# Patient Record
Sex: MALE | Race: WHITE | URBAN - METROPOLITAN AREA
[De-identification: names, ages, dates, MRNs, and addresses within clinical notes are randomized per-mention and may not be internally consistent; named-entity substitution may affect disease eponyms.]

---

## 2023-05-05 ENCOUNTER — APPOINTMENT (OUTPATIENT)
Dept: GENERAL RADIOLOGY | Age: 38
End: 2023-05-05
Payer: COMMERCIAL

## 2023-05-05 ENCOUNTER — HOSPITAL ENCOUNTER (INPATIENT)
Age: 38
LOS: 13 days | Discharge: STILL ADMITTED OR ADMINISTRATIVE | End: 2023-05-18
Attending: EMERGENCY MEDICINE | Admitting: SURGERY
Payer: COMMERCIAL

## 2023-05-05 ENCOUNTER — APPOINTMENT (OUTPATIENT)
Dept: CT IMAGING | Age: 38
End: 2023-05-05
Payer: COMMERCIAL

## 2023-05-05 DIAGNOSIS — S72.91XC: Primary | ICD-10-CM

## 2023-05-05 DIAGNOSIS — N50.1 PELVIC HEMATOMA, MALE: ICD-10-CM

## 2023-05-05 DIAGNOSIS — R57.8 HEMORRHAGIC SHOCK (HCC): ICD-10-CM

## 2023-05-05 DIAGNOSIS — S32.9XXA CLOSED DISPLACED FRACTURE OF PELVIS, UNSPECIFIED PART OF PELVIS, INITIAL ENCOUNTER (HCC): ICD-10-CM

## 2023-05-05 PROBLEM — S32.592A CLOSED FRACTURE OF MULTIPLE PUBIC RAMI, LEFT, INITIAL ENCOUNTER (HCC): Status: ACTIVE | Noted: 2023-05-05

## 2023-05-05 PROBLEM — S32.10XA SACRAL FRACTURE, CLOSED (HCC): Status: ACTIVE | Noted: 2023-05-05

## 2023-05-05 PROBLEM — S72.351B OPEN DISPLACED COMMINUTED FRACTURE OF SHAFT OF RIGHT FEMUR (HCC): Status: ACTIVE | Noted: 2023-05-05

## 2023-05-05 PROBLEM — T14.90XA TRAUMA: Status: ACTIVE | Noted: 2023-05-05

## 2023-05-05 PROBLEM — S32.411A: Status: ACTIVE | Noted: 2023-05-05

## 2023-05-05 PROBLEM — S22.000A THORACIC COMPRESSION FRACTURE (HCC): Status: ACTIVE | Noted: 2023-05-05

## 2023-05-05 LAB
ABO + RH BLD: NORMAL
ALBUMIN SERPL-MCNC: 4.1 G/DL (ref 3.5–5.2)
ALP SERPL-CCNC: 69 U/L (ref 40–129)
ALT SERPL-CCNC: 47 U/L (ref 0–40)
AMPHET UR QL SCN: NOT DETECTED
ANGLE (CLOT STRENGTH): 68.8 DEGREE (ref 59–74)
ANGLE (CLOT STRENGTH): 69.7 DEGREE (ref 59–74)
ANION GAP SERPL CALCULATED.3IONS-SCNC: 15 MMOL/L (ref 7–16)
APAP SERPL-MCNC: <5 MCG/ML (ref 10–30)
APTT BLD: 32.6 SEC (ref 24.5–35.1)
AST SERPL-CCNC: 69 U/L (ref 0–39)
B.E.: -6 MMOL/L (ref -3–3)
BARBITURATES UR QL SCN: NOT DETECTED
BASOPHILS # BLD: 0 E9/L (ref 0–0.2)
BASOPHILS NFR BLD: 0.2 % (ref 0–2)
BENZODIAZ UR QL SCN: NOT DETECTED
BILIRUB SERPL-MCNC: 0.2 MG/DL (ref 0–1.2)
BLD GP AB SCN SERPL QL: NORMAL
BLOOD BANK DISPENSE STATUS: NORMAL
BLOOD BANK PRODUCT CODE: NORMAL
BPU ID: NORMAL
BUN SERPL-MCNC: 12 MG/DL (ref 6–20)
BURR CELLS: ABNORMAL
CALCIUM SERPL-MCNC: 8.5 MG/DL (ref 8.6–10.2)
CANNABINOIDS UR QL SCN: NOT DETECTED
CHLORIDE SERPL-SCNC: 105 MMOL/L (ref 98–107)
CO2 SERPL-SCNC: 21 MMOL/L (ref 22–29)
COCAINE UR QL SCN: NOT DETECTED
COHB: 0.3 % (ref 0–1.5)
CREAT SERPL-MCNC: 1.2 MG/DL (ref 0.7–1.2)
CRITICAL: ABNORMAL
DATE ANALYZED: ABNORMAL
DATE OF COLLECTION: ABNORMAL
DESCRIPTION BLOOD BANK: NORMAL
DRUG SCREEN COMMENT UR-IMP: ABNORMAL
EOSINOPHIL # BLD: 0 E9/L (ref 0.05–0.5)
EOSINOPHIL NFR BLD: 0 % (ref 0–6)
EPL-TEG: 0.1 % (ref 0–15)
EPL-TEG: 1.3 % (ref 0–15)
ERYTHROCYTE [DISTWIDTH] IN BLOOD BY AUTOMATED COUNT: 12 FL (ref 11.5–15)
ERYTHROCYTE [DISTWIDTH] IN BLOOD BY AUTOMATED COUNT: 13.1 FL (ref 11.5–15)
ETHANOLAMINE SERPL-MCNC: <10 MG/DL (ref 0–0.08)
ETHANOLAMINE SERPL-MCNC: <10 MG/DL (ref 0–0.08)
FENTANYL SCREEN, URINE: POSITIVE
G-TEG: 8.1 K D/SC (ref 4.5–11)
G-TEG: 8.7 K D/SC (ref 4.5–11)
GLUCOSE SERPL-MCNC: 163 MG/DL (ref 74–99)
HCO3: 18.9 MMOL/L (ref 22–26)
HCT VFR BLD AUTO: 42.3 % (ref 37–54)
HCT VFR BLD AUTO: 42.5 % (ref 37–54)
HGB BLD-MCNC: 14.1 G/DL (ref 12.5–16.5)
HGB BLD-MCNC: 14.1 G/DL (ref 12.5–16.5)
HHB: 0.6 % (ref 0–5)
INR BLD: 1.1
K (CLOTTING TIME): 1.4 MIN (ref 1–3)
K (CLOTTING TIME): 1.6 MIN (ref 1–3)
LAB: ABNORMAL
LACTATE BLDV-SCNC: 5.7 MMOL/L (ref 0.5–2.2)
LY30 (FIBRINOLYSIS): 0.1 % (ref 0–8)
LY30 (FIBRINOLYSIS): 1.3 % (ref 0–8)
LYMPHOCYTES # BLD: 0.76 E9/L (ref 1.5–4)
LYMPHOCYTES NFR BLD: 2.6 % (ref 20–42)
Lab: ABNORMAL
MA (MAX AMPLITUDE): 62 MM (ref 50–70)
MA (MAX AMPLITUDE): 63.5 MM (ref 50–70)
MCH RBC QN AUTO: 29.8 PG (ref 26–35)
MCH RBC QN AUTO: 30.5 PG (ref 26–35)
MCHC RBC AUTO-ENTMCNC: 33.2 % (ref 32–34.5)
MCHC RBC AUTO-ENTMCNC: 33.3 % (ref 32–34.5)
MCV RBC AUTO: 89.9 FL (ref 80–99.9)
MCV RBC AUTO: 91.4 FL (ref 80–99.9)
METHADONE UR QL SCN: NOT DETECTED
METHB: 0.5 % (ref 0–1.5)
MODE: ABNORMAL
MONOCYTES # BLD: 1.26 E9/L (ref 0.1–0.95)
MONOCYTES NFR BLD: 5.2 % (ref 2–12)
NEUTROPHILS # BLD: 23.28 E9/L (ref 1.8–7.3)
NEUTS SEG NFR BLD: 92.2 % (ref 43–80)
O2 CONTENT: 21.4 ML/DL
O2 SATURATION: 99.4 % (ref 92–98.5)
O2HB: 98.6 % (ref 94–97)
OPERATOR ID: 421
OPIATES UR QL SCN: NOT DETECTED
OXYCODONE URINE: NOT DETECTED
PATIENT TEMP: 37 C
PCO2: 35.5 MMHG (ref 35–45)
PCP UR QL SCN: NOT DETECTED
PH BLOOD GAS: 7.34 (ref 7.35–7.45)
PLATELET # BLD AUTO: 155 E9/L (ref 130–450)
PLATELET # BLD AUTO: 228 E9/L (ref 130–450)
PMV BLD AUTO: 10.7 FL (ref 7–12)
PMV BLD AUTO: 11.5 FL (ref 7–12)
PO2: 450.6 MMHG (ref 75–100)
POIKILOCYTES: ABNORMAL
POTASSIUM SERPL-SCNC: 3.2 MMOL/L (ref 3.5–5)
POTASSIUM SERPL-SCNC: 3.68 MMOL/L (ref 3.5–5)
PROT SERPL-MCNC: 6.2 G/DL (ref 6.4–8.3)
PROTHROMBIN TIME: 12 SEC (ref 9.3–12.4)
R (REACTION TIME): 3.7 MIN (ref 5–10)
R (REACTION TIME): 5.2 MIN (ref 5–10)
RBC # BLD AUTO: 4.63 E12/L (ref 3.8–5.8)
RBC # BLD AUTO: 4.73 E12/L (ref 3.8–5.8)
SALICYLATES SERPL-MCNC: <0.3 MG/DL (ref 0–30)
SODIUM SERPL-SCNC: 141 MMOL/L (ref 132–146)
SOURCE, BLOOD GAS: ABNORMAL
THB: 14.6 G/DL (ref 11.5–16.5)
TIME ANALYZED: 1755
TRICYCLIC ANTIDEPRESSANTS SCREEN SERUM: NEGATIVE NG/ML
WBC # BLD: 20.9 E9/L (ref 4.5–11.5)
WBC # BLD: 25.3 E9/L (ref 4.5–11.5)

## 2023-05-05 PROCEDURE — 51702 INSERT TEMP BLADDER CATH: CPT

## 2023-05-05 PROCEDURE — 72170 X-RAY EXAM OF PELVIS: CPT

## 2023-05-05 PROCEDURE — 36415 COLL VENOUS BLD VENIPUNCTURE: CPT

## 2023-05-05 PROCEDURE — 82805 BLOOD GASES W/O2 SATURATION: CPT

## 2023-05-05 PROCEDURE — P9016 RBC LEUKOCYTES REDUCED: HCPCS

## 2023-05-05 PROCEDURE — 6810039001 HC L1 TRAUMA PRIORITY

## 2023-05-05 PROCEDURE — 70450 CT HEAD/BRAIN W/O DYE: CPT

## 2023-05-05 PROCEDURE — 72125 CT NECK SPINE W/O DYE: CPT

## 2023-05-05 PROCEDURE — 83605 ASSAY OF LACTIC ACID: CPT

## 2023-05-05 PROCEDURE — 85347 COAGULATION TIME ACTIVATED: CPT

## 2023-05-05 PROCEDURE — 80179 DRUG ASSAY SALICYLATE: CPT

## 2023-05-05 PROCEDURE — 2W3MX1Z IMMOBILIZATION OF LEFT LOWER EXTREMITY USING SPLINT: ICD-10-PCS | Performed by: ORTHOPAEDIC SURGERY

## 2023-05-05 PROCEDURE — 80307 DRUG TEST PRSMV CHEM ANLYZR: CPT

## 2023-05-05 PROCEDURE — 36430 TRANSFUSION BLD/BLD COMPNT: CPT

## 2023-05-05 PROCEDURE — 73551 X-RAY EXAM OF FEMUR 1: CPT

## 2023-05-05 PROCEDURE — 73552 X-RAY EXAM OF FEMUR 2/>: CPT

## 2023-05-05 PROCEDURE — 2500000003 HC RX 250 WO HCPCS

## 2023-05-05 PROCEDURE — 85025 COMPLETE CBC W/AUTO DIFF WBC: CPT

## 2023-05-05 PROCEDURE — 85027 COMPLETE CBC AUTOMATED: CPT

## 2023-05-05 PROCEDURE — 6360000002 HC RX W HCPCS

## 2023-05-05 PROCEDURE — 85576 BLOOD PLATELET AGGREGATION: CPT

## 2023-05-05 PROCEDURE — 6360000002 HC RX W HCPCS: Performed by: EMERGENCY MEDICINE

## 2023-05-05 PROCEDURE — 99223 1ST HOSP IP/OBS HIGH 75: CPT | Performed by: SURGERY

## 2023-05-05 PROCEDURE — 76376 3D RENDER W/INTRP POSTPROCES: CPT

## 2023-05-05 PROCEDURE — 87081 CULTURE SCREEN ONLY: CPT

## 2023-05-05 PROCEDURE — 2580000003 HC RX 258

## 2023-05-05 PROCEDURE — P9059 PLASMA, FRZ BETWEEN 8-24HOUR: HCPCS

## 2023-05-05 PROCEDURE — 80143 DRUG ASSAY ACETAMINOPHEN: CPT

## 2023-05-05 PROCEDURE — 99254 IP/OBS CNSLTJ NEW/EST MOD 60: CPT | Performed by: ORTHOPAEDIC SURGERY

## 2023-05-05 PROCEDURE — 3209999900 HC NO CHARGE RADIOLOGY

## 2023-05-05 PROCEDURE — 71045 X-RAY EXAM CHEST 1 VIEW: CPT

## 2023-05-05 PROCEDURE — 71260 CT THORAX DX C+: CPT

## 2023-05-05 PROCEDURE — 86920 COMPATIBILITY TEST SPIN: CPT

## 2023-05-05 PROCEDURE — 84132 ASSAY OF SERUM POTASSIUM: CPT

## 2023-05-05 PROCEDURE — 30233N1 TRANSFUSION OF NONAUTOLOGOUS RED BLOOD CELLS INTO PERIPHERAL VEIN, PERCUTANEOUS APPROACH: ICD-10-PCS | Performed by: SURGERY

## 2023-05-05 PROCEDURE — 86923 COMPATIBILITY TEST ELECTRIC: CPT

## 2023-05-05 PROCEDURE — 86900 BLOOD TYPING SEROLOGIC ABO: CPT

## 2023-05-05 PROCEDURE — 30233K1 TRANSFUSION OF NONAUTOLOGOUS FROZEN PLASMA INTO PERIPHERAL VEIN, PERCUTANEOUS APPROACH: ICD-10-PCS | Performed by: SURGERY

## 2023-05-05 PROCEDURE — 85610 PROTHROMBIN TIME: CPT

## 2023-05-05 PROCEDURE — 72128 CT CHEST SPINE W/O DYE: CPT

## 2023-05-05 PROCEDURE — 6360000004 HC RX CONTRAST MEDICATION: Performed by: RADIOLOGY

## 2023-05-05 PROCEDURE — 85384 FIBRINOGEN ACTIVITY: CPT

## 2023-05-05 PROCEDURE — 2000000000 HC ICU R&B

## 2023-05-05 PROCEDURE — 2580000003 HC RX 258: Performed by: STUDENT IN AN ORGANIZED HEALTH CARE EDUCATION/TRAINING PROGRAM

## 2023-05-05 PROCEDURE — 96375 TX/PRO/DX INJ NEW DRUG ADDON: CPT

## 2023-05-05 PROCEDURE — 0QS9XZZ REPOSITION LEFT FEMORAL SHAFT, EXTERNAL APPROACH: ICD-10-PCS | Performed by: ORTHOPAEDIC SURGERY

## 2023-05-05 PROCEDURE — 80053 COMPREHEN METABOLIC PANEL: CPT

## 2023-05-05 PROCEDURE — 2580000003 HC RX 258: Performed by: EMERGENCY MEDICINE

## 2023-05-05 PROCEDURE — 86850 RBC ANTIBODY SCREEN: CPT

## 2023-05-05 PROCEDURE — 6370000000 HC RX 637 (ALT 250 FOR IP)

## 2023-05-05 PROCEDURE — 6360000002 HC RX W HCPCS: Performed by: STUDENT IN AN ORGANIZED HEALTH CARE EDUCATION/TRAINING PROGRAM

## 2023-05-05 PROCEDURE — 72131 CT LUMBAR SPINE W/O DYE: CPT

## 2023-05-05 PROCEDURE — 74177 CT ABD & PELVIS W/CONTRAST: CPT

## 2023-05-05 PROCEDURE — 86901 BLOOD TYPING SEROLOGIC RH(D): CPT

## 2023-05-05 PROCEDURE — 85730 THROMBOPLASTIN TIME PARTIAL: CPT

## 2023-05-05 PROCEDURE — 99285 EMERGENCY DEPT VISIT HI MDM: CPT

## 2023-05-05 PROCEDURE — 70498 CT ANGIOGRAPHY NECK: CPT

## 2023-05-05 PROCEDURE — 82077 ASSAY SPEC XCP UR&BREATH IA: CPT

## 2023-05-05 RX ORDER — POLYETHYLENE GLYCOL 3350 17 G/17G
17 POWDER, FOR SOLUTION ORAL DAILY
Status: DISCONTINUED | OUTPATIENT
Start: 2023-05-05 | End: 2023-05-18 | Stop reason: HOSPADM

## 2023-05-05 RX ORDER — LIDOCAINE HYDROCHLORIDE AND EPINEPHRINE 10; 10 MG/ML; UG/ML
INJECTION, SOLUTION INFILTRATION; PERINEURAL
Status: DISCONTINUED
Start: 2023-05-05 | End: 2023-05-06

## 2023-05-05 RX ORDER — KETAMINE HCL IN NACL, ISO-OSM 100MG/10ML
20 SYRINGE (ML) INJECTION ONCE
Status: DISCONTINUED | OUTPATIENT
Start: 2023-05-05 | End: 2023-05-06

## 2023-05-05 RX ORDER — SODIUM CHLORIDE 9 MG/ML
INJECTION, SOLUTION INTRAVENOUS PRN
Status: DISCONTINUED | OUTPATIENT
Start: 2023-05-05 | End: 2023-05-07 | Stop reason: SDUPTHER

## 2023-05-05 RX ORDER — METHOCARBAMOL 750 MG/1
750 TABLET, FILM COATED ORAL 4 TIMES DAILY
Status: DISCONTINUED | OUTPATIENT
Start: 2023-05-05 | End: 2023-05-08

## 2023-05-05 RX ORDER — SODIUM CHLORIDE 0.9 % (FLUSH) 0.9 %
10 SYRINGE (ML) INJECTION PRN
Status: DISCONTINUED | OUTPATIENT
Start: 2023-05-05 | End: 2023-05-18 | Stop reason: HOSPADM

## 2023-05-05 RX ORDER — GABAPENTIN 100 MG/1
200 CAPSULE ORAL EVERY 8 HOURS
Status: DISCONTINUED | OUTPATIENT
Start: 2023-05-05 | End: 2023-05-08

## 2023-05-05 RX ORDER — FENTANYL CITRATE 50 UG/ML
INJECTION, SOLUTION INTRAMUSCULAR; INTRAVENOUS
Status: COMPLETED
Start: 2023-05-05 | End: 2023-05-05

## 2023-05-05 RX ORDER — FENTANYL CITRATE 50 UG/ML
50 INJECTION, SOLUTION INTRAMUSCULAR; INTRAVENOUS ONCE
Status: COMPLETED | OUTPATIENT
Start: 2023-05-05 | End: 2023-05-05

## 2023-05-05 RX ORDER — SODIUM CHLORIDE 9 MG/ML
INJECTION, SOLUTION INTRAVENOUS CONTINUOUS
Status: DISCONTINUED | OUTPATIENT
Start: 2023-05-05 | End: 2023-05-06

## 2023-05-05 RX ORDER — FENTANYL CITRATE 50 UG/ML
INJECTION, SOLUTION INTRAMUSCULAR; INTRAVENOUS DAILY PRN
Status: COMPLETED | OUTPATIENT
Start: 2023-05-05 | End: 2023-05-05

## 2023-05-05 RX ORDER — HYDROMORPHONE HYDROCHLORIDE 1 MG/ML
0.5 INJECTION, SOLUTION INTRAMUSCULAR; INTRAVENOUS; SUBCUTANEOUS
Status: DISCONTINUED | OUTPATIENT
Start: 2023-05-05 | End: 2023-05-06

## 2023-05-05 RX ORDER — KETAMINE HYDROCHLORIDE 10 MG/ML
20 INJECTION INTRAMUSCULAR; INTRAVENOUS ONCE
Status: DISCONTINUED | OUTPATIENT
Start: 2023-05-05 | End: 2023-05-05 | Stop reason: SDUPTHER

## 2023-05-05 RX ORDER — HYDROMORPHONE HYDROCHLORIDE 1 MG/ML
1 INJECTION, SOLUTION INTRAMUSCULAR; INTRAVENOUS; SUBCUTANEOUS ONCE
Status: COMPLETED | OUTPATIENT
Start: 2023-05-05 | End: 2023-05-05

## 2023-05-05 RX ORDER — ONDANSETRON 4 MG/1
4 TABLET, ORALLY DISINTEGRATING ORAL EVERY 8 HOURS PRN
Status: DISCONTINUED | OUTPATIENT
Start: 2023-05-05 | End: 2023-05-18 | Stop reason: HOSPADM

## 2023-05-05 RX ORDER — SODIUM CHLORIDE 0.9 % (FLUSH) 0.9 %
10 SYRINGE (ML) INJECTION EVERY 12 HOURS SCHEDULED
Status: DISCONTINUED | OUTPATIENT
Start: 2023-05-05 | End: 2023-05-18 | Stop reason: HOSPADM

## 2023-05-05 RX ORDER — OXYCODONE HYDROCHLORIDE 10 MG/1
10 TABLET ORAL EVERY 4 HOURS PRN
Status: DISCONTINUED | OUTPATIENT
Start: 2023-05-05 | End: 2023-05-12

## 2023-05-05 RX ORDER — ACETAMINOPHEN 325 MG/1
650 TABLET ORAL EVERY 6 HOURS
Status: DISCONTINUED | OUTPATIENT
Start: 2023-05-05 | End: 2023-05-18 | Stop reason: HOSPADM

## 2023-05-05 RX ORDER — SODIUM CHLORIDE, SODIUM LACTATE, POTASSIUM CHLORIDE, CALCIUM CHLORIDE 600; 310; 30; 20 MG/100ML; MG/100ML; MG/100ML; MG/100ML
INJECTION, SOLUTION INTRAVENOUS CONTINUOUS
Status: DISCONTINUED | OUTPATIENT
Start: 2023-05-05 | End: 2023-05-07 | Stop reason: SDUPTHER

## 2023-05-05 RX ORDER — OXYCODONE HYDROCHLORIDE 5 MG/1
5 TABLET ORAL EVERY 4 HOURS PRN
Status: DISCONTINUED | OUTPATIENT
Start: 2023-05-05 | End: 2023-05-12

## 2023-05-05 RX ORDER — SENNA AND DOCUSATE SODIUM 50; 8.6 MG/1; MG/1
1 TABLET, FILM COATED ORAL 2 TIMES DAILY
Status: DISCONTINUED | OUTPATIENT
Start: 2023-05-05 | End: 2023-05-09

## 2023-05-05 RX ORDER — ONDANSETRON 2 MG/ML
4 INJECTION INTRAMUSCULAR; INTRAVENOUS EVERY 6 HOURS PRN
Status: DISCONTINUED | OUTPATIENT
Start: 2023-05-05 | End: 2023-05-18 | Stop reason: HOSPADM

## 2023-05-05 RX ORDER — HYDROMORPHONE HYDROCHLORIDE 1 MG/ML
0.25 INJECTION, SOLUTION INTRAMUSCULAR; INTRAVENOUS; SUBCUTANEOUS
Status: DISCONTINUED | OUTPATIENT
Start: 2023-05-05 | End: 2023-05-06

## 2023-05-05 RX ADMIN — FENTANYL CITRATE 50 MCG: 50 INJECTION, SOLUTION INTRAMUSCULAR; INTRAVENOUS at 20:05

## 2023-05-05 RX ADMIN — CEFAZOLIN 2000 MG: 2 INJECTION, POWDER, FOR SOLUTION INTRAMUSCULAR; INTRAVENOUS at 17:46

## 2023-05-05 RX ADMIN — METHOCARBAMOL 750 MG: 750 TABLET ORAL at 21:16

## 2023-05-05 RX ADMIN — GENTAMICIN SULFATE 475 MG: 40 INJECTION, SOLUTION INTRAMUSCULAR; INTRAVENOUS at 18:11

## 2023-05-05 RX ADMIN — SODIUM CHLORIDE, POTASSIUM CHLORIDE, SODIUM LACTATE AND CALCIUM CHLORIDE: 600; 310; 30; 20 INJECTION, SOLUTION INTRAVENOUS at 19:40

## 2023-05-05 RX ADMIN — HYDROMORPHONE HYDROCHLORIDE 0.5 MG: 1 INJECTION, SOLUTION INTRAMUSCULAR; INTRAVENOUS; SUBCUTANEOUS at 22:19

## 2023-05-05 RX ADMIN — HYDROMORPHONE HYDROCHLORIDE 1 MG: 1 INJECTION, SOLUTION INTRAMUSCULAR; INTRAVENOUS; SUBCUTANEOUS at 20:40

## 2023-05-05 RX ADMIN — GABAPENTIN 200 MG: 100 CAPSULE ORAL at 21:17

## 2023-05-05 RX ADMIN — DOCUSATE SODIUM AND SENNOSIDES 1 TABLET: 8.6; 5 TABLET, FILM COATED ORAL at 21:16

## 2023-05-05 RX ADMIN — IOPAMIDOL 90 ML: 755 INJECTION, SOLUTION INTRAVENOUS at 18:47

## 2023-05-05 RX ADMIN — SODIUM CHLORIDE: 9 INJECTION, SOLUTION INTRAVENOUS at 19:02

## 2023-05-05 RX ADMIN — ACETAMINOPHEN 650 MG: 325 TABLET ORAL at 21:30

## 2023-05-05 RX ADMIN — FENTANYL CITRATE 50 MCG: 50 INJECTION, SOLUTION INTRAMUSCULAR; INTRAVENOUS at 18:05

## 2023-05-05 RX ADMIN — HYDROMORPHONE HYDROCHLORIDE 0.5 MG: 1 INJECTION, SOLUTION INTRAMUSCULAR; INTRAVENOUS; SUBCUTANEOUS at 19:06

## 2023-05-05 RX ADMIN — ONDANSETRON 4 MG: 2 INJECTION INTRAMUSCULAR; INTRAVENOUS at 23:18

## 2023-05-05 RX ADMIN — FENTANYL CITRATE 50 MCG: 50 INJECTION, SOLUTION INTRAMUSCULAR; INTRAVENOUS at 17:57

## 2023-05-05 RX ADMIN — SODIUM CHLORIDE, POTASSIUM CHLORIDE, SODIUM LACTATE AND CALCIUM CHLORIDE: 600; 310; 30; 20 INJECTION, SOLUTION INTRAVENOUS at 21:09

## 2023-05-05 ASSESSMENT — PAIN DESCRIPTION - PAIN TYPE
TYPE: ACUTE PAIN

## 2023-05-05 ASSESSMENT — PAIN - FUNCTIONAL ASSESSMENT
PAIN_FUNCTIONAL_ASSESSMENT: PREVENTS OR INTERFERES WITH ALL ACTIVE AND SOME PASSIVE ACTIVITIES
PAIN_FUNCTIONAL_ASSESSMENT: PREVENTS OR INTERFERES WITH MANY ACTIVE NOT PASSIVE ACTIVITIES
PAIN_FUNCTIONAL_ASSESSMENT: PREVENTS OR INTERFERES WITH ALL ACTIVE AND SOME PASSIVE ACTIVITIES

## 2023-05-05 ASSESSMENT — PAIN DESCRIPTION - DESCRIPTORS
DESCRIPTORS: ACHING;SORE;DISCOMFORT
DESCRIPTORS: ACHING;DISCOMFORT;SORE
DESCRIPTORS: ACHING;BURNING;DISCOMFORT

## 2023-05-05 ASSESSMENT — PAIN DESCRIPTION - ORIENTATION
ORIENTATION: MID
ORIENTATION: MID
ORIENTATION: RIGHT;MID
ORIENTATION: RIGHT

## 2023-05-05 ASSESSMENT — PAIN DESCRIPTION - FREQUENCY
FREQUENCY: CONTINUOUS

## 2023-05-05 ASSESSMENT — PAIN SCALES - GENERAL
PAINLEVEL_OUTOF10: 10
PAINLEVEL_OUTOF10: 7
PAINLEVEL_OUTOF10: 7
PAINLEVEL_OUTOF10: 4
PAINLEVEL_OUTOF10: 3
PAINLEVEL_OUTOF10: 10
PAINLEVEL_OUTOF10: 4

## 2023-05-05 ASSESSMENT — PAIN DESCRIPTION - LOCATION
LOCATION: BACK
LOCATION: BACK
LOCATION: PELVIS;BACK
LOCATION: BACK;PELVIS;LEG

## 2023-05-05 ASSESSMENT — PAIN DESCRIPTION - ONSET
ONSET: ON-GOING

## 2023-05-06 ENCOUNTER — ANESTHESIA (OUTPATIENT)
Dept: OPERATING ROOM | Age: 38
End: 2023-05-06
Payer: COMMERCIAL

## 2023-05-06 ENCOUNTER — ANESTHESIA EVENT (OUTPATIENT)
Dept: OPERATING ROOM | Age: 38
End: 2023-05-06
Payer: COMMERCIAL

## 2023-05-06 ENCOUNTER — APPOINTMENT (OUTPATIENT)
Dept: GENERAL RADIOLOGY | Age: 38
End: 2023-05-06
Payer: COMMERCIAL

## 2023-05-06 PROBLEM — E87.20 LACTIC ACIDOSIS: Status: ACTIVE | Noted: 2023-05-06

## 2023-05-06 PROBLEM — S72.91XC: Status: ACTIVE | Noted: 2023-05-05

## 2023-05-06 PROBLEM — D62 ACUTE BLOOD LOSS ANEMIA: Status: ACTIVE | Noted: 2023-05-06

## 2023-05-06 PROBLEM — R52 ACUTE PAIN: Status: ACTIVE | Noted: 2023-05-06

## 2023-05-06 PROBLEM — S32.9XXA CLOSED DISPLACED FRACTURE OF PELVIS (HCC): Status: ACTIVE | Noted: 2023-05-05

## 2023-05-06 LAB
ALBUMIN SERPL-MCNC: 3.4 G/DL (ref 3.5–5.2)
ALP SERPL-CCNC: 48 U/L (ref 40–129)
ALT SERPL-CCNC: 53 U/L (ref 0–40)
ANION GAP SERPL CALCULATED.3IONS-SCNC: 10 MMOL/L (ref 7–16)
ANION GAP: 12 MMOL/L (ref 7–16)
AST SERPL-CCNC: 139 U/L (ref 0–39)
B.E.: -3.5 MMOL/L (ref -3–3)
B.E.: -5.9 MMOL/L (ref -3–3)
BASOPHILS # BLD: 0 E9/L (ref 0–0.2)
BASOPHILS NFR BLD: 0.1 % (ref 0–2)
BILIRUB SERPL-MCNC: 0.4 MG/DL (ref 0–1.2)
BUN SERPL-MCNC: 14 MG/DL (ref 6–20)
CALCIUM SERPL-MCNC: 7.9 MG/DL (ref 8.6–10.2)
CARDIOPULMONARY BYPASS: NO
CHLORIDE SERPL-SCNC: 105 MMOL/L (ref 98–107)
CO2 SERPL-SCNC: 24 MMOL/L (ref 22–29)
COHB: 1.1 % (ref 0–1.5)
CREAT SERPL-MCNC: 1 MG/DL (ref 0.7–1.2)
CRITICAL NOTIFICATION: YES
CRITICAL: ABNORMAL
DATE ANALYZED: ABNORMAL
DATE OF COLLECTION: ABNORMAL
DEVICE: ABNORMAL
EOSINOPHIL # BLD: 0 E9/L (ref 0.05–0.5)
EOSINOPHIL NFR BLD: 0 % (ref 0–6)
ERYTHROCYTE [DISTWIDTH] IN BLOOD BY AUTOMATED COUNT: 13.3 FL (ref 11.5–15)
ERYTHROCYTE [DISTWIDTH] IN BLOOD BY AUTOMATED COUNT: 13.4 FL (ref 11.5–15)
ERYTHROCYTE [DISTWIDTH] IN BLOOD BY AUTOMATED COUNT: 13.6 FL (ref 11.5–15)
GFR, ESTIMATED: >60 ML/MIN/1.73
GLUCOSE BLD-MCNC: 121 MG/DL (ref 74–99)
GLUCOSE SERPL-MCNC: 175 MG/DL (ref 74–99)
HCO3: 19.4 MMOL/L (ref 22–26)
HCO3: 22 MMOL/L (ref 22–26)
HCT VFR BLD AUTO: 28.8 % (ref 37–54)
HCT VFR BLD AUTO: 32.7 % (ref 37–54)
HCT VFR BLD AUTO: 37.4 % (ref 37–54)
HCT VFR BLD AUTO: 38.5 % (ref 37–54)
HCT VFR BLD AUTO: 43.3 % (ref 37–54)
HEMATOCRIT: 31 % (ref 37–54)
HEMOGLOBIN: 10.6 G/DL (ref 12.5–15.5)
HGB BLD-MCNC: 11.1 G/DL (ref 12.5–16.5)
HGB BLD-MCNC: 12.4 G/DL (ref 12.5–16.5)
HGB BLD-MCNC: 12.6 G/DL (ref 12.5–16.5)
HGB BLD-MCNC: 14.4 G/DL (ref 12.5–16.5)
HGB BLD-MCNC: 9.8 G/DL (ref 12.5–16.5)
HHB: 4.4 % (ref 0–5)
LAB: ABNORMAL
LACTATE BLDV-SCNC: 3.1 MMOL/L (ref 0.5–2.2)
LACTATE BLDV-SCNC: 3.9 MMOL/L (ref 0.5–2.2)
LACTATE BLDV-SCNC: 5.6 MMOL/L (ref 0.5–2.2)
LACTATE BLDV-SCNC: 6.2 MMOL/L (ref 0.5–2.2)
LYMPHOCYTES # BLD: 0.88 E9/L (ref 1.5–4)
LYMPHOCYTES NFR BLD: 6.1 % (ref 20–42)
Lab: ABNORMAL
MCH RBC QN AUTO: 29.4 PG (ref 26–35)
MCH RBC QN AUTO: 30.1 PG (ref 26–35)
MCH RBC QN AUTO: 30.2 PG (ref 26–35)
MCHC RBC AUTO-ENTMCNC: 32.7 % (ref 32–34.5)
MCHC RBC AUTO-ENTMCNC: 33.9 % (ref 32–34.5)
MCHC RBC AUTO-ENTMCNC: 34 % (ref 32–34.5)
MCV RBC AUTO: 88.3 FL (ref 80–99.9)
MCV RBC AUTO: 88.9 FL (ref 80–99.9)
MCV RBC AUTO: 90 FL (ref 80–99.9)
METHB: 0.2 % (ref 0–1.5)
MODE: ABNORMAL
MONOCYTES # BLD: 0.59 E9/L (ref 0.1–0.95)
MONOCYTES NFR BLD: 3.5 % (ref 2–12)
NEUTROPHILS # BLD: 13.23 E9/L (ref 1.8–7.3)
NEUTS SEG NFR BLD: 90.4 % (ref 43–80)
O2 CONTENT: 19.7 ML/DL
O2 SATURATION: 95.5 % (ref 92–98.5)
O2 SATURATION: 98.9 % (ref 92–98.5)
O2HB: 94.3 % (ref 94–97)
OPERATOR ID: 2962
OPERATOR ID: ABNORMAL
OVALOCYTES: ABNORMAL
PATIENT TEMP: 37 C
PCO2 37: 40.9 MMHG (ref 35–45)
PCO2: 37.7 MMHG (ref 35–45)
PH 37: 7.34 (ref 7.35–7.45)
PH BLOOD GAS: 7.33 (ref 7.35–7.45)
PLATELET # BLD AUTO: 103 E9/L (ref 130–450)
PLATELET # BLD AUTO: 114 E9/L (ref 130–450)
PLATELET # BLD AUTO: 120 E9/L (ref 130–450)
PMV BLD AUTO: 10.8 FL (ref 7–12)
PMV BLD AUTO: 10.8 FL (ref 7–12)
PMV BLD AUTO: 11.1 FL (ref 7–12)
PO2 37: 134.9 MMHG (ref 80–100)
PO2: 81.5 MMHG (ref 75–100)
POC BUN: 14 MG/DL (ref 8–23)
POC CHLORIDE: 107 MMOL/L (ref 100–108)
POC CO2: 21.3 MMOL/L (ref 22–29)
POC CREATININE: 0.9 MG/DL (ref 0.7–1.2)
POC IONIZED CALCIUM: 1.1 (ref 1.1–1.3)
POC LACTIC ACID: 3.4 (ref 0.5–2.2)
POC SODIUM: 140 MMOL/L (ref 132–146)
POC SOURCE: ABNORMAL
POIKILOCYTES: ABNORMAL
POTASSIUM SERPL-SCNC: 4.7 MMOL/L (ref 3.5–5)
POTASSIUM SERPL-SCNC: 4.9 MMOL/L (ref 3.5–5.5)
PROT SERPL-MCNC: 5.9 G/DL (ref 6.4–8.3)
RBC # BLD AUTO: 3.26 E12/L (ref 3.8–5.8)
RBC # BLD AUTO: 3.68 E12/L (ref 3.8–5.8)
RBC # BLD AUTO: 4.28 E12/L (ref 3.8–5.8)
SODIUM SERPL-SCNC: 139 MMOL/L (ref 132–146)
SOURCE, BLOOD GAS: ABNORMAL
THB: 14.8 G/DL (ref 11.5–16.5)
TIME ANALYZED: 218
WBC # BLD: 11.6 E9/L (ref 4.5–11.5)
WBC # BLD: 14.3 E9/L (ref 4.5–11.5)
WBC # BLD: 14.7 E9/L (ref 4.5–11.5)

## 2023-05-06 PROCEDURE — 7100000000 HC PACU RECOVERY - FIRST 15 MIN

## 2023-05-06 PROCEDURE — 2580000003 HC RX 258

## 2023-05-06 PROCEDURE — 6360000002 HC RX W HCPCS: Performed by: ORTHOPAEDIC SURGERY

## 2023-05-06 PROCEDURE — 99291 CRITICAL CARE FIRST HOUR: CPT | Performed by: SURGERY

## 2023-05-06 PROCEDURE — 2500000003 HC RX 250 WO HCPCS

## 2023-05-06 PROCEDURE — 2580000003 HC RX 258: Performed by: STUDENT IN AN ORGANIZED HEALTH CARE EDUCATION/TRAINING PROGRAM

## 2023-05-06 PROCEDURE — 3700000001 HC ADD 15 MINUTES (ANESTHESIA): Performed by: ORTHOPAEDIC SURGERY

## 2023-05-06 PROCEDURE — 3209999900 FLUORO FOR SURGICAL PROCEDURES

## 2023-05-06 PROCEDURE — 80053 COMPREHEN METABOLIC PANEL: CPT

## 2023-05-06 PROCEDURE — 6370000000 HC RX 637 (ALT 250 FOR IP)

## 2023-05-06 PROCEDURE — 73552 X-RAY EXAM OF FEMUR 2/>: CPT

## 2023-05-06 PROCEDURE — 6360000002 HC RX W HCPCS

## 2023-05-06 PROCEDURE — 73521 X-RAY EXAM HIPS BI 2 VIEWS: CPT

## 2023-05-06 PROCEDURE — 2000000000 HC ICU R&B

## 2023-05-06 PROCEDURE — 0QS904Z REPOSITION LEFT FEMORAL SHAFT WITH INTERNAL FIXATION DEVICE, OPEN APPROACH: ICD-10-PCS | Performed by: ORTHOPAEDIC SURGERY

## 2023-05-06 PROCEDURE — 7100000001 HC PACU RECOVERY - ADDTL 15 MIN

## 2023-05-06 PROCEDURE — 83605 ASSAY OF LACTIC ACID: CPT

## 2023-05-06 PROCEDURE — 2709999900 HC NON-CHARGEABLE SUPPLY: Performed by: ORTHOPAEDIC SURGERY

## 2023-05-06 PROCEDURE — 11012 DEB SKIN BONE AT FX SITE: CPT | Performed by: ORTHOPAEDIC SURGERY

## 2023-05-06 PROCEDURE — 85027 COMPLETE CBC AUTOMATED: CPT

## 2023-05-06 PROCEDURE — 0QS335Z REPOSITION LEFT PELVIC BONE WITH EXTERNAL FIXATION DEVICE, PERCUTANEOUS APPROACH: ICD-10-PCS | Performed by: ORTHOPAEDIC SURGERY

## 2023-05-06 PROCEDURE — 36415 COLL VENOUS BLD VENIPUNCTURE: CPT

## 2023-05-06 PROCEDURE — 6360000002 HC RX W HCPCS: Performed by: STUDENT IN AN ORGANIZED HEALTH CARE EDUCATION/TRAINING PROGRAM

## 2023-05-06 PROCEDURE — L0464 TLSO 4MOD SACRO-SCAP PRE: HCPCS

## 2023-05-06 PROCEDURE — 85025 COMPLETE CBC W/AUTO DIFF WBC: CPT

## 2023-05-06 PROCEDURE — 0QH905Z INSERTION OF EXTERNAL FIXATION DEVICE INTO LEFT FEMORAL SHAFT, OPEN APPROACH: ICD-10-PCS | Performed by: ORTHOPAEDIC SURGERY

## 2023-05-06 PROCEDURE — 85014 HEMATOCRIT: CPT

## 2023-05-06 PROCEDURE — 85018 HEMOGLOBIN: CPT

## 2023-05-06 PROCEDURE — 82805 BLOOD GASES W/O2 SATURATION: CPT

## 2023-05-06 PROCEDURE — 3600000005 HC SURGERY LEVEL 5 BASE: Performed by: ORTHOPAEDIC SURGERY

## 2023-05-06 PROCEDURE — 20690 APPL UNIPLN UNI EXT FIXJ SYS: CPT | Performed by: ORTHOPAEDIC SURGERY

## 2023-05-06 PROCEDURE — 11981 INSERTION DRUG DLVR IMPLANT: CPT | Performed by: ORTHOPAEDIC SURGERY

## 2023-05-06 PROCEDURE — 3700000000 HC ANESTHESIA ATTENDED CARE: Performed by: ORTHOPAEDIC SURGERY

## 2023-05-06 PROCEDURE — 27506 TREATMENT OF THIGH FRACTURE: CPT | Performed by: ORTHOPAEDIC SURGERY

## 2023-05-06 PROCEDURE — 82803 BLOOD GASES ANY COMBINATION: CPT

## 2023-05-06 PROCEDURE — 37799 UNLISTED PX VASCULAR SURGERY: CPT

## 2023-05-06 PROCEDURE — C1713 ANCHOR/SCREW BN/BN,TIS/BN: HCPCS | Performed by: ORTHOPAEDIC SURGERY

## 2023-05-06 PROCEDURE — 0QS235Z REPOSITION RIGHT PELVIC BONE WITH EXTERNAL FIXATION DEVICE, PERCUTANEOUS APPROACH: ICD-10-PCS | Performed by: ORTHOPAEDIC SURGERY

## 2023-05-06 PROCEDURE — 27198 CLSD TX PELVIC RING FX: CPT | Performed by: ORTHOPAEDIC SURGERY

## 2023-05-06 PROCEDURE — 3600000015 HC SURGERY LEVEL 5 ADDTL 15MIN: Performed by: ORTHOPAEDIC SURGERY

## 2023-05-06 PROCEDURE — 72170 X-RAY EXAM OF PELVIS: CPT

## 2023-05-06 PROCEDURE — 2720000010 HC SURG SUPPLY STERILE: Performed by: ORTHOPAEDIC SURGERY

## 2023-05-06 DEVICE — STIMULAN® RAPID CURE PROVIDED STERILE FOR SINGLE PATIENT USE. STIMULAN® RAPID CURE CONTAINS CALCIUM SULFATE POWDER AND MIXING SOLUTION IN PRE-MEASURED QUANTITIES SO THAT WHEN MIXED TOGETHER IN A STERILE MIXING BOWL, THE RESULTANT PASTE IS TO BE DIGITALLY PACKED INTO OPEN BONE VOID/GAP TO SET INSITU OR PLACED INTO THE MOULD PROVIDED, THE MIXTURE SETS TO FORM BEADS. THE BIODEGRADABLE, RADIOPAQUE BEADS ARE RESORBED IN APPROXIMATELY 30 – 60 DAYS WHEN USED IN ACCORDANCE WITH THE DEVICE LABELLING. STIMULAN® RAPID CURE IS MANUFACTURED FROM SYNTHETIC IMPLANT GRADE CALCIUM SULFATE DIHYDRATE(CASO4.2H2O) THAT RESORBS AND IS REPLACED WITH BONE DURING THE HEALING PROCESS. ALSO, AS THE BONE VOID FILLER BEADS ARE BIODEGRADABLE AND BIOCOMPATIBLE, THEY MAY BE USED AT AN INFECTED SITE.
Type: IMPLANTABLE DEVICE | Site: LEG | Status: FUNCTIONAL
Brand: STIMULAN® RAPID CURE

## 2023-05-06 RX ORDER — SODIUM CHLORIDE, SODIUM LACTATE, POTASSIUM CHLORIDE, CALCIUM CHLORIDE 600; 310; 30; 20 MG/100ML; MG/100ML; MG/100ML; MG/100ML
INJECTION, SOLUTION INTRAVENOUS CONTINUOUS PRN
Status: DISCONTINUED | OUTPATIENT
Start: 2023-05-06 | End: 2023-05-06 | Stop reason: SDUPTHER

## 2023-05-06 RX ORDER — SODIUM CHLORIDE 0.9 % (FLUSH) 0.9 %
5-40 SYRINGE (ML) INJECTION EVERY 12 HOURS SCHEDULED
Status: DISCONTINUED | OUTPATIENT
Start: 2023-05-07 | End: 2023-05-08 | Stop reason: HOSPADM

## 2023-05-06 RX ORDER — ACETAMINOPHEN 325 MG/1
650 TABLET ORAL
Status: DISCONTINUED | OUTPATIENT
Start: 2023-05-06 | End: 2023-05-07 | Stop reason: SDUPTHER

## 2023-05-06 RX ORDER — CEFAZOLIN SODIUM 1 G/3ML
INJECTION, POWDER, FOR SOLUTION INTRAMUSCULAR; INTRAVENOUS PRN
Status: DISCONTINUED | OUTPATIENT
Start: 2023-05-06 | End: 2023-05-06 | Stop reason: SDUPTHER

## 2023-05-06 RX ORDER — MIDAZOLAM HYDROCHLORIDE 1 MG/ML
INJECTION INTRAMUSCULAR; INTRAVENOUS PRN
Status: DISCONTINUED | OUTPATIENT
Start: 2023-05-06 | End: 2023-05-06 | Stop reason: SDUPTHER

## 2023-05-06 RX ORDER — TOBRAMYCIN 1.2 G/30ML
INJECTION, POWDER, LYOPHILIZED, FOR SOLUTION INTRAVENOUS PRN
Status: DISCONTINUED | OUTPATIENT
Start: 2023-05-06 | End: 2023-05-06 | Stop reason: ALTCHOICE

## 2023-05-06 RX ORDER — LABETALOL HYDROCHLORIDE 5 MG/ML
10 INJECTION, SOLUTION INTRAVENOUS
Status: DISCONTINUED | OUTPATIENT
Start: 2023-05-06 | End: 2023-05-18 | Stop reason: HOSPADM

## 2023-05-06 RX ORDER — VANCOMYCIN HYDROCHLORIDE 1 G/20ML
INJECTION, POWDER, LYOPHILIZED, FOR SOLUTION INTRAVENOUS PRN
Status: DISCONTINUED | OUTPATIENT
Start: 2023-05-06 | End: 2023-05-06 | Stop reason: ALTCHOICE

## 2023-05-06 RX ORDER — PHENYLEPHRINE HCL IN 0.9% NACL 1 MG/10 ML
SYRINGE (ML) INTRAVENOUS PRN
Status: DISCONTINUED | OUTPATIENT
Start: 2023-05-06 | End: 2023-05-06 | Stop reason: SDUPTHER

## 2023-05-06 RX ORDER — SODIUM CHLORIDE, SODIUM LACTATE, POTASSIUM CHLORIDE, AND CALCIUM CHLORIDE .6; .31; .03; .02 G/100ML; G/100ML; G/100ML; G/100ML
1000 INJECTION, SOLUTION INTRAVENOUS ONCE
Status: COMPLETED | OUTPATIENT
Start: 2023-05-06 | End: 2023-05-06

## 2023-05-06 RX ORDER — ROCURONIUM BROMIDE 10 MG/ML
INJECTION, SOLUTION INTRAVENOUS PRN
Status: DISCONTINUED | OUTPATIENT
Start: 2023-05-06 | End: 2023-05-06 | Stop reason: SDUPTHER

## 2023-05-06 RX ORDER — CALCIUM CHLORIDE 100 MG/ML
INJECTION INTRAVENOUS; INTRAVENTRICULAR PRN
Status: DISCONTINUED | OUTPATIENT
Start: 2023-05-06 | End: 2023-05-06 | Stop reason: SDUPTHER

## 2023-05-06 RX ORDER — DROPERIDOL 2.5 MG/ML
0.62 INJECTION, SOLUTION INTRAMUSCULAR; INTRAVENOUS
Status: ACTIVE | OUTPATIENT
Start: 2023-05-06 | End: 2023-05-07

## 2023-05-06 RX ORDER — SODIUM CHLORIDE 0.9 % (FLUSH) 0.9 %
5-40 SYRINGE (ML) INJECTION PRN
Status: DISCONTINUED | OUTPATIENT
Start: 2023-05-06 | End: 2023-05-08 | Stop reason: HOSPADM

## 2023-05-06 RX ORDER — HYDROMORPHONE HYDROCHLORIDE 1 MG/ML
0.5 INJECTION, SOLUTION INTRAMUSCULAR; INTRAVENOUS; SUBCUTANEOUS EVERY 5 MIN PRN
Status: DISCONTINUED | OUTPATIENT
Start: 2023-05-06 | End: 2023-05-07 | Stop reason: SDUPTHER

## 2023-05-06 RX ORDER — ONDANSETRON 2 MG/ML
4 INJECTION INTRAMUSCULAR; INTRAVENOUS
Status: DISCONTINUED | OUTPATIENT
Start: 2023-05-06 | End: 2023-05-07 | Stop reason: SDUPTHER

## 2023-05-06 RX ORDER — DIPHENHYDRAMINE HYDROCHLORIDE 50 MG/ML
12.5 INJECTION INTRAMUSCULAR; INTRAVENOUS
Status: DISCONTINUED | OUTPATIENT
Start: 2023-05-06 | End: 2023-05-07

## 2023-05-06 RX ORDER — DEXAMETHASONE SODIUM PHOSPHATE 10 MG/ML
INJECTION INTRAMUSCULAR; INTRAVENOUS PRN
Status: DISCONTINUED | OUTPATIENT
Start: 2023-05-06 | End: 2023-05-06 | Stop reason: SDUPTHER

## 2023-05-06 RX ORDER — HYDRALAZINE HYDROCHLORIDE 20 MG/ML
10 INJECTION INTRAMUSCULAR; INTRAVENOUS
Status: DISCONTINUED | OUTPATIENT
Start: 2023-05-06 | End: 2023-05-18 | Stop reason: HOSPADM

## 2023-05-06 RX ORDER — MAGNESIUM SULFATE HEPTAHYDRATE 40 MG/ML
INJECTION, SOLUTION INTRAVENOUS PRN
Status: DISCONTINUED | OUTPATIENT
Start: 2023-05-06 | End: 2023-05-06 | Stop reason: SDUPTHER

## 2023-05-06 RX ORDER — LIDOCAINE HYDROCHLORIDE 20 MG/ML
INJECTION, SOLUTION INTRAVENOUS PRN
Status: DISCONTINUED | OUTPATIENT
Start: 2023-05-06 | End: 2023-05-06 | Stop reason: SDUPTHER

## 2023-05-06 RX ORDER — FENTANYL CITRATE 50 UG/ML
INJECTION, SOLUTION INTRAMUSCULAR; INTRAVENOUS PRN
Status: DISCONTINUED | OUTPATIENT
Start: 2023-05-06 | End: 2023-05-06 | Stop reason: SDUPTHER

## 2023-05-06 RX ORDER — OXYCODONE HYDROCHLORIDE 5 MG/1
5 TABLET ORAL
Status: DISCONTINUED | OUTPATIENT
Start: 2023-05-06 | End: 2023-05-07 | Stop reason: SDUPTHER

## 2023-05-06 RX ORDER — DEXTROSE MONOHYDRATE 100 MG/ML
INJECTION, SOLUTION INTRAVENOUS CONTINUOUS PRN
Status: DISCONTINUED | OUTPATIENT
Start: 2023-05-06 | End: 2023-05-14

## 2023-05-06 RX ORDER — KETAMINE HCL IN NACL, ISO-OSM 100MG/10ML
SYRINGE (ML) INJECTION PRN
Status: DISCONTINUED | OUTPATIENT
Start: 2023-05-06 | End: 2023-05-06 | Stop reason: SDUPTHER

## 2023-05-06 RX ORDER — SODIUM CHLORIDE 9 MG/ML
INJECTION, SOLUTION INTRAVENOUS PRN
Status: DISCONTINUED | OUTPATIENT
Start: 2023-05-06 | End: 2023-05-08 | Stop reason: HOSPADM

## 2023-05-06 RX ORDER — HYDROMORPHONE HYDROCHLORIDE 1 MG/ML
0.25 INJECTION, SOLUTION INTRAMUSCULAR; INTRAVENOUS; SUBCUTANEOUS EVERY 5 MIN PRN
Status: DISCONTINUED | OUTPATIENT
Start: 2023-05-06 | End: 2023-05-07 | Stop reason: SDUPTHER

## 2023-05-06 RX ORDER — HYDROMORPHONE HYDROCHLORIDE 1 MG/ML
0.5 INJECTION, SOLUTION INTRAMUSCULAR; INTRAVENOUS; SUBCUTANEOUS
Status: DISCONTINUED | OUTPATIENT
Start: 2023-05-06 | End: 2023-05-15

## 2023-05-06 RX ORDER — SODIUM CHLORIDE, SODIUM LACTATE, POTASSIUM CHLORIDE, CALCIUM CHLORIDE 600; 310; 30; 20 MG/100ML; MG/100ML; MG/100ML; MG/100ML
INJECTION, SOLUTION INTRAVENOUS CONTINUOUS
Status: DISCONTINUED | OUTPATIENT
Start: 2023-05-07 | End: 2023-05-07

## 2023-05-06 RX ORDER — PROPOFOL 10 MG/ML
INJECTION, EMULSION INTRAVENOUS PRN
Status: DISCONTINUED | OUTPATIENT
Start: 2023-05-06 | End: 2023-05-06 | Stop reason: SDUPTHER

## 2023-05-06 RX ADMIN — SODIUM CHLORIDE, POTASSIUM CHLORIDE, SODIUM LACTATE AND CALCIUM CHLORIDE 1000 ML: 600; 310; 30; 20 INJECTION, SOLUTION INTRAVENOUS at 07:33

## 2023-05-06 RX ADMIN — HYDROMORPHONE HYDROCHLORIDE 0.5 MG: 1 INJECTION, SOLUTION INTRAMUSCULAR; INTRAVENOUS; SUBCUTANEOUS at 15:08

## 2023-05-06 RX ADMIN — TRIMETHOBENZAMIDE HYDROCHLORIDE 200 MG: 100 INJECTION INTRAMUSCULAR at 02:11

## 2023-05-06 RX ADMIN — Medication 100 MCG: at 09:27

## 2023-05-06 RX ADMIN — Medication 200 MCG: at 08:12

## 2023-05-06 RX ADMIN — DOCUSATE SODIUM AND SENNOSIDES 1 TABLET: 8.6; 5 TABLET, FILM COATED ORAL at 20:07

## 2023-05-06 RX ADMIN — DEXAMETHASONE SODIUM PHOSPHATE 10 MG: 10 INJECTION INTRAMUSCULAR; INTRAVENOUS at 07:59

## 2023-05-06 RX ADMIN — SODIUM CHLORIDE, POTASSIUM CHLORIDE, SODIUM LACTATE AND CALCIUM CHLORIDE: 600; 310; 30; 20 INJECTION, SOLUTION INTRAVENOUS at 09:33

## 2023-05-06 RX ADMIN — SODIUM CHLORIDE, PRESERVATIVE FREE 10 ML: 5 INJECTION INTRAVENOUS at 21:12

## 2023-05-06 RX ADMIN — SODIUM CHLORIDE, SODIUM LACTATE, POTASSIUM CHLORIDE, CALCIUM CHLORIDE: 600; 310; 30; 20 INJECTION, SOLUTION INTRAVENOUS at 07:54

## 2023-05-06 RX ADMIN — OXYCODONE HYDROCHLORIDE 10 MG: 10 TABLET ORAL at 00:08

## 2023-05-06 RX ADMIN — GABAPENTIN 200 MG: 100 CAPSULE ORAL at 20:07

## 2023-05-06 RX ADMIN — SODIUM CHLORIDE, POTASSIUM CHLORIDE, SODIUM LACTATE AND CALCIUM CHLORIDE: 600; 310; 30; 20 INJECTION, SOLUTION INTRAVENOUS at 11:08

## 2023-05-06 RX ADMIN — OXYCODONE HYDROCHLORIDE 10 MG: 10 TABLET ORAL at 22:47

## 2023-05-06 RX ADMIN — HYDROMORPHONE HYDROCHLORIDE 0.5 MG: 1 INJECTION, SOLUTION INTRAMUSCULAR; INTRAVENOUS; SUBCUTANEOUS at 18:20

## 2023-05-06 RX ADMIN — SODIUM CHLORIDE, POTASSIUM CHLORIDE, SODIUM LACTATE AND CALCIUM CHLORIDE: 600; 310; 30; 20 INJECTION, SOLUTION INTRAVENOUS at 18:01

## 2023-05-06 RX ADMIN — SODIUM CHLORIDE, POTASSIUM CHLORIDE, SODIUM LACTATE AND CALCIUM CHLORIDE 1000 ML: 600; 310; 30; 20 INJECTION, SOLUTION INTRAVENOUS at 13:22

## 2023-05-06 RX ADMIN — OXYCODONE HYDROCHLORIDE 10 MG: 10 TABLET ORAL at 17:03

## 2023-05-06 RX ADMIN — SODIUM CHLORIDE, PRESERVATIVE FREE 10 ML: 5 INJECTION INTRAVENOUS at 01:20

## 2023-05-06 RX ADMIN — METHOCARBAMOL 750 MG: 750 TABLET ORAL at 17:02

## 2023-05-06 RX ADMIN — ONDANSETRON 4 MG: 2 INJECTION INTRAMUSCULAR; INTRAVENOUS at 05:18

## 2023-05-06 RX ADMIN — TRANEXAMIC ACID 1000 MG: 100 INJECTION, SOLUTION INTRAVENOUS at 08:24

## 2023-05-06 RX ADMIN — OXYCODONE HYDROCHLORIDE 10 MG: 10 TABLET ORAL at 04:08

## 2023-05-06 RX ADMIN — FENTANYL CITRATE 50 MCG: 0.05 INJECTION, SOLUTION INTRAMUSCULAR; INTRAVENOUS at 09:13

## 2023-05-06 RX ADMIN — ACETAMINOPHEN 650 MG: 325 TABLET ORAL at 15:07

## 2023-05-06 RX ADMIN — SODIUM CHLORIDE, POTASSIUM CHLORIDE, SODIUM LACTATE AND CALCIUM CHLORIDE: 600; 310; 30; 20 INJECTION, SOLUTION INTRAVENOUS at 07:54

## 2023-05-06 RX ADMIN — SODIUM CHLORIDE, POTASSIUM CHLORIDE, SODIUM LACTATE AND CALCIUM CHLORIDE: 600; 310; 30; 20 INJECTION, SOLUTION INTRAVENOUS at 08:45

## 2023-05-06 RX ADMIN — GABAPENTIN 200 MG: 100 CAPSULE ORAL at 05:30

## 2023-05-06 RX ADMIN — SODIUM CHLORIDE, PRESERVATIVE FREE 10 ML: 5 INJECTION INTRAVENOUS at 05:17

## 2023-05-06 RX ADMIN — FENTANYL CITRATE 50 MCG: 0.05 INJECTION, SOLUTION INTRAMUSCULAR; INTRAVENOUS at 09:17

## 2023-05-06 RX ADMIN — POLYETHYLENE GLYCOL 3350 17 G: 17 POWDER, FOR SOLUTION ORAL at 11:39

## 2023-05-06 RX ADMIN — CEFAZOLIN 2000 MG: 2 INJECTION, POWDER, FOR SOLUTION INTRAMUSCULAR; INTRAVENOUS at 08:45

## 2023-05-06 RX ADMIN — LIDOCAINE HYDROCHLORIDE 100 MG: 20 INJECTION, SOLUTION INTRAVENOUS at 07:59

## 2023-05-06 RX ADMIN — PROPOFOL 100 MG: 10 INJECTION, EMULSION INTRAVENOUS at 07:59

## 2023-05-06 RX ADMIN — CALCIUM CHLORIDE 0.5 G: 100 INJECTION, SOLUTION INTRAVENOUS at 08:35

## 2023-05-06 RX ADMIN — FENTANYL CITRATE 50 MCG: 0.05 INJECTION, SOLUTION INTRAMUSCULAR; INTRAVENOUS at 07:59

## 2023-05-06 RX ADMIN — GABAPENTIN 200 MG: 100 CAPSULE ORAL at 15:07

## 2023-05-06 RX ADMIN — FENTANYL CITRATE 50 MCG: 0.05 INJECTION, SOLUTION INTRAMUSCULAR; INTRAVENOUS at 09:00

## 2023-05-06 RX ADMIN — Medication 200 MCG: at 08:09

## 2023-05-06 RX ADMIN — ROCURONIUM BROMIDE 20 MG: 50 INJECTION, SOLUTION INTRAVENOUS at 08:58

## 2023-05-06 RX ADMIN — HYDROMORPHONE HYDROCHLORIDE 0.5 MG: 1 INJECTION, SOLUTION INTRAMUSCULAR; INTRAVENOUS; SUBCUTANEOUS at 01:21

## 2023-05-06 RX ADMIN — HYDROMORPHONE HYDROCHLORIDE 0.5 MG: 1 INJECTION, SOLUTION INTRAMUSCULAR; INTRAVENOUS; SUBCUTANEOUS at 21:12

## 2023-05-06 RX ADMIN — OXYCODONE HYDROCHLORIDE 10 MG: 10 TABLET ORAL at 12:50

## 2023-05-06 RX ADMIN — METHOCARBAMOL 750 MG: 750 TABLET ORAL at 20:07

## 2023-05-06 RX ADMIN — ROCURONIUM BROMIDE 50 MG: 50 INJECTION, SOLUTION INTRAVENOUS at 08:10

## 2023-05-06 RX ADMIN — ACETAMINOPHEN 650 MG: 325 TABLET ORAL at 01:59

## 2023-05-06 RX ADMIN — DOCUSATE SODIUM AND SENNOSIDES 1 TABLET: 8.6; 5 TABLET, FILM COATED ORAL at 11:39

## 2023-05-06 RX ADMIN — Medication 200 MCG: at 08:28

## 2023-05-06 RX ADMIN — CEFAZOLIN 2 G: 1 INJECTION, POWDER, FOR SOLUTION INTRAMUSCULAR; INTRAVENOUS at 08:20

## 2023-05-06 RX ADMIN — SODIUM CHLORIDE, POTASSIUM CHLORIDE, SODIUM LACTATE AND CALCIUM CHLORIDE 1000 ML: 600; 310; 30; 20 INJECTION, SOLUTION INTRAVENOUS at 02:08

## 2023-05-06 RX ADMIN — CEFAZOLIN 2000 MG: 2 INJECTION, POWDER, FOR SOLUTION INTRAMUSCULAR; INTRAVENOUS at 17:00

## 2023-05-06 RX ADMIN — SODIUM CHLORIDE, PRESERVATIVE FREE 10 ML: 5 INJECTION INTRAVENOUS at 11:39

## 2023-05-06 RX ADMIN — ACETAMINOPHEN 650 MG: 325 TABLET ORAL at 20:07

## 2023-05-06 RX ADMIN — METHOCARBAMOL 750 MG: 750 TABLET ORAL at 11:39

## 2023-05-06 RX ADMIN — GENTAMICIN SULFATE 475 MG: 40 INJECTION, SOLUTION INTRAMUSCULAR; INTRAVENOUS at 18:01

## 2023-05-06 RX ADMIN — MIDAZOLAM 2 MG: 1 INJECTION INTRAMUSCULAR; INTRAVENOUS at 07:55

## 2023-05-06 RX ADMIN — SODIUM CHLORIDE 0.5 MCG/KG/HR: 9 INJECTION, SOLUTION INTRAVENOUS at 08:05

## 2023-05-06 RX ADMIN — Medication 50 MG: at 07:59

## 2023-05-06 RX ADMIN — HYDROMORPHONE HYDROCHLORIDE 0.5 MG: 1 INJECTION, SOLUTION INTRAMUSCULAR; INTRAVENOUS; SUBCUTANEOUS at 05:18

## 2023-05-06 RX ADMIN — ROCURONIUM BROMIDE 20 MG: 50 INJECTION, SOLUTION INTRAVENOUS at 09:19

## 2023-05-06 RX ADMIN — MAGNESIUM SULFATE HEPTAHYDRATE 2000 MG: 40 INJECTION, SOLUTION INTRAVENOUS at 08:10

## 2023-05-06 ASSESSMENT — PAIN SCALES - GENERAL
PAINLEVEL_OUTOF10: 10
PAINLEVEL_OUTOF10: 8
PAINLEVEL_OUTOF10: 8
PAINLEVEL_OUTOF10: 10
PAINLEVEL_OUTOF10: 10
PAINLEVEL_OUTOF10: 0
PAINLEVEL_OUTOF10: 10
PAINLEVEL_OUTOF10: 9
PAINLEVEL_OUTOF10: 4
PAINLEVEL_OUTOF10: 10
PAINLEVEL_OUTOF10: 0
PAINLEVEL_OUTOF10: 10
PAINLEVEL_OUTOF10: 0
PAINLEVEL_OUTOF10: 9
PAINLEVEL_OUTOF10: 10

## 2023-05-06 ASSESSMENT — PAIN DESCRIPTION - FREQUENCY
FREQUENCY: CONTINUOUS

## 2023-05-06 ASSESSMENT — PAIN DESCRIPTION - ORIENTATION
ORIENTATION: RIGHT;MID
ORIENTATION: RIGHT
ORIENTATION: RIGHT;LEFT;LOWER;MID;UPPER
ORIENTATION: RIGHT;MID
ORIENTATION: RIGHT
ORIENTATION: RIGHT;MID

## 2023-05-06 ASSESSMENT — PAIN - FUNCTIONAL ASSESSMENT
PAIN_FUNCTIONAL_ASSESSMENT: PREVENTS OR INTERFERES WITH ALL ACTIVE AND SOME PASSIVE ACTIVITIES
PAIN_FUNCTIONAL_ASSESSMENT: PREVENTS OR INTERFERES WITH ALL ACTIVE AND SOME PASSIVE ACTIVITIES
PAIN_FUNCTIONAL_ASSESSMENT: PREVENTS OR INTERFERES SOME ACTIVE ACTIVITIES AND ADLS
PAIN_FUNCTIONAL_ASSESSMENT: PREVENTS OR INTERFERES WITH ALL ACTIVE AND SOME PASSIVE ACTIVITIES

## 2023-05-06 ASSESSMENT — PAIN DESCRIPTION - ONSET
ONSET: ON-GOING

## 2023-05-06 ASSESSMENT — PAIN DESCRIPTION - PAIN TYPE
TYPE: ACUTE PAIN;SURGICAL PAIN
TYPE: ACUTE PAIN
TYPE: ACUTE PAIN;SURGICAL PAIN
TYPE: ACUTE PAIN

## 2023-05-06 ASSESSMENT — PAIN DESCRIPTION - LOCATION
LOCATION: BACK;LEG;PELVIS
LOCATION: BACK;LEG;PELVIS
LOCATION: LEG
LOCATION: BACK;LEG
LOCATION: BACK;LEG;PELVIS
LOCATION: LEG;PELVIS
LOCATION: BACK;LEG;PELVIS
LOCATION: LEG;PELVIS
LOCATION: LEG

## 2023-05-06 ASSESSMENT — PAIN DESCRIPTION - DESCRIPTORS
DESCRIPTORS: ACHING;DISCOMFORT;SHARP;SORE;SPASM
DESCRIPTORS: ACHING;THROBBING
DESCRIPTORS: ACHING;DISCOMFORT;SHARP;SORE;SPASM
DESCRIPTORS: ACHING;SHOOTING
DESCRIPTORS: ACHING;THROBBING;TENDER;DISCOMFORT
DESCRIPTORS: STABBING;THROBBING

## 2023-05-06 NOTE — ANESTHESIA POSTPROCEDURE EVALUATION
Department of Anesthesiology  Postprocedure Note    Patient: Myles Candelario  MRN: [id number]  YOB: 1985  Date of evaluation: 5/6/2023      Procedure Summary     Date: 05/06/23 Room / Location: [facility] OR 08 / [facility]    Anesthesia Start: 6190 Anesthesia Stop: 6050    Procedure: PELVIS EXTERNAL FIXATOR APPLICATION. RIGHT FEMUR IRRIGATION & DEBRIDEMENT,  EXTERNAL FIXATOR APPLICATION. (Right: Leg Upper) Diagnosis:       Type III open displaced comminuted fracture of shaft of right femur, initial encounter ([address]      Closed displaced fracture of pelvis, unspecified part of pelvis, initial encounter ([address]      (Type III open displaced comminuted fracture of shaft of right femur, initial encounter ([address] [S72.351C])      (Closed displaced fracture of pelvis, unspecified part of pelvis, initial encounter ([address] [S32. 9XXA])    Surgeons: Kelsie Earl MD Responsible Provider: Randall Syed MD    Anesthesia Type: General ASA Status: 3          Anesthesia Type: General    Danielle Phase I: Danielle Score: 8    Danielle Phase II:        Anesthesia Post Evaluation    Patient location during evaluation: PACU  Patient participation: complete - patient participated  Level of consciousness: awake and alert  Airway patency: patent  Nausea & Vomiting: no nausea and no vomiting  Complications: no  Cardiovascular status: blood pressure returned to baseline  Respiratory status: acceptable  Hydration status: euvolemic

## 2023-05-06 NOTE — ANESTHESIA PROCEDURE NOTES
Arterial Line:    An arterial line was placed using surface landmarks, in the OR for the following indication(s): continuous blood pressure monitoring and blood sampling needed. A 20 gauge (size), 1 and 3/4 inch (length), Angiocath (type) catheter was placed, into the left radial artery, secured by Tegaderm and tape and CHG impregnated. Anesthesia type: General    Events:  patient tolerated procedure well with no complications. 5/6/2023 8:00 AM5/6/2023 8:10 AM  Anesthesiologist: Lesli Parikh MD  Other anesthesia staff: Basilio Rodríguez RN  Performed: Anesthesiologist and Other anesthesia staff   Preanesthetic Checklist  Completed: patient identified, IV checked, site marked, risks and benefits discussed, surgical/procedural consents, equipment checked, pre-op evaluation, timeout performed, anesthesia consent given, oxygen available, monitors applied/VS acknowledged, fire risk safety assessment completed and verbalized and blood product R/B/A discussed and consented

## 2023-05-06 NOTE — ANESTHESIA PRE PROCEDURE
Department of Anesthesiology  Preprocedure Note       Name:  Theodora Plummer   Age:  45 y.o.  :  1985                                          MRN:  [id number]         Date:  2023      Surgeon: Johnnie Anderson):  Milvia Colvin MD    Procedure: Procedure(s):  PELVIS OPEN REDUCTION INTERNAL FIXATION VS PELVIS EXTERNAL FIXATOR APPLICATION AND RIGHT FEMUR OPEN REDUCTION INTERNAL FIXATION    Medications prior to admission:   Prior to Admission medications    Not on File       Current medications:    Current Facility-Administered Medications   Medication Dose Route Frequency Provider Last Rate Last Admin    trimethobenzamide (TIGAN) injection 200 mg  200 mg IntraMUSCular Q6H PRN Sade Garcia MD   200 mg at 23 0211    0.9 % sodium chloride infusion   IntraVENous Continuous Sade Garcia  mL/hr at 23 0309 Rate Change at 23 0309    sodium chloride flush 0.9 % injection 10 mL  10 mL IntraVENous 2 times per day Sade Garcia MD        sodium chloride flush 0.9 % injection 10 mL  10 mL IntraVENous PRN Sade Garcia MD   10 mL at 23 0517    0.9 % sodium chloride infusion   IntraVENous PRN Sade Garcia MD        ondansetron (ZOFRAN-ODT) disintegrating tablet 4 mg  4 mg Oral Q8H PRN Sade Garcia MD        Or    ondansetron (ZOFRAN) injection 4 mg  4 mg IntraVENous Q6H PRN Sade Garcia MD   4 mg at 23 0518    polyethylene glycol (GLYCOLAX) packet 17 g  17 g Oral Daily Sade Garcia MD        lactated ringers IV soln infusion   IntraVENous Continuous Sade Garcia  mL/hr at 23 2109 New Bag at 23 210    acetaminophen (TYLENOL) tablet 650 mg  650 mg Oral Q6H Xenia Ge MD   650 mg at 23 0159    HYDROmorphone HCl PF (DILAUDID) injection 0.25 mg  0.25 mg IntraVENous Q3H PRN Sade Garcia MD        Or    HYDROmorphone HCl PF (DILAUDID) injection 0.5 mg  0.5 mg IntraVENous Q3H PRN Sade Garcia MD

## 2023-05-07 ENCOUNTER — APPOINTMENT (OUTPATIENT)
Dept: GENERAL RADIOLOGY | Age: 38
End: 2023-05-07
Payer: COMMERCIAL

## 2023-05-07 PROBLEM — S22.040A COMPRESSION FRACTURE OF T4 VERTEBRA (HCC): Status: ACTIVE | Noted: 2023-05-07

## 2023-05-07 LAB
ABO + RH BLD: NORMAL
ALBUMIN SERPL-MCNC: 2.7 G/DL (ref 3.5–5.2)
ALP SERPL-CCNC: 41 U/L (ref 40–129)
ALT SERPL-CCNC: 53 U/L (ref 0–40)
AMORPH SED URNS QL MICRO: ABNORMAL
ANGLE (CLOT STRENGTH): 70.3 DEGREE (ref 59–74)
ANION GAP SERPL CALCULATED.3IONS-SCNC: 9 MMOL/L (ref 7–16)
ANISOCYTOSIS: ABNORMAL
AST SERPL-CCNC: 188 U/L (ref 0–39)
B.E.: 3.4 MMOL/L (ref -3–3)
BACTERIA URNS QL MICRO: ABNORMAL /HPF
BASOPHILS # BLD: 0.01 E9/L (ref 0–0.2)
BASOPHILS NFR BLD: 0.1 % (ref 0–2)
BILIRUB SERPL-MCNC: 0.5 MG/DL (ref 0–1.2)
BILIRUB UR QL STRIP: NEGATIVE
BLD GP AB SCN SERPL QL: NORMAL
BUN SERPL-MCNC: 12 MG/DL (ref 6–20)
CALCIUM SERPL-MCNC: 8.4 MG/DL (ref 8.6–10.2)
CHLORIDE SERPL-SCNC: 101 MMOL/L (ref 98–107)
CLARITY UR: CLEAR
CO2 SERPL-SCNC: 25 MMOL/L (ref 22–29)
COHB: 0.6 % (ref 0–1.5)
COLOR UR: YELLOW
CREAT SERPL-MCNC: 0.8 MG/DL (ref 0.7–1.2)
CRITICAL: ABNORMAL
DATE ANALYZED: ABNORMAL
DATE OF COLLECTION: ABNORMAL
EOSINOPHIL # BLD: 0 E9/L (ref 0.05–0.5)
EOSINOPHIL NFR BLD: 0 % (ref 0–6)
EPL-TEG: 2.2 % (ref 0–15)
ERYTHROCYTE [DISTWIDTH] IN BLOOD BY AUTOMATED COUNT: 13.3 FL (ref 11.5–15)
ERYTHROCYTE [DISTWIDTH] IN BLOOD BY AUTOMATED COUNT: 13.3 FL (ref 11.5–15)
G-TEG: 10.7 K D/SC (ref 4.5–11)
GLUCOSE SERPL-MCNC: 147 MG/DL (ref 74–99)
GLUCOSE UR STRIP-MCNC: NEGATIVE MG/DL
HCO3: 27.6 MMOL/L (ref 22–26)
HCT VFR BLD AUTO: 23.6 % (ref 37–54)
HCT VFR BLD AUTO: 25.8 % (ref 37–54)
HCT VFR BLD AUTO: 29.8 % (ref 37–54)
HGB BLD-MCNC: 10 G/DL (ref 12.5–16.5)
HGB BLD-MCNC: 7.8 G/DL (ref 12.5–16.5)
HGB BLD-MCNC: 8.5 G/DL (ref 12.5–16.5)
HGB UR QL STRIP: ABNORMAL
HHB: 4.4 % (ref 0–5)
HYPOCHROMIA: ABNORMAL
IMM GRANULOCYTES # BLD: 0.05 E9/L
IMM GRANULOCYTES NFR BLD: 0.4 % (ref 0–5)
K (CLOTTING TIME): 1.5 MIN (ref 1–3)
KETONES UR STRIP-MCNC: NEGATIVE MG/DL
LAB: ABNORMAL
LACTATE BLDV-SCNC: 1.9 MMOL/L (ref 0.5–2.2)
LACTATE BLDV-SCNC: 2.4 MMOL/L (ref 0.5–2.2)
LACTATE BLDV-SCNC: 2.7 MMOL/L (ref 0.5–2.2)
LACTATE BLDV-SCNC: 4.6 MMOL/L (ref 0.5–2.2)
LEUKOCYTE ESTERASE UR QL STRIP: NEGATIVE
LY30 (FIBRINOLYSIS): 2.2 % (ref 0–8)
LYMPHOCYTES # BLD: 0.77 E9/L (ref 1.5–4)
LYMPHOCYTES NFR BLD: 6 % (ref 20–42)
Lab: ABNORMAL
MA (MAX AMPLITUDE): 68.1 MM (ref 50–70)
MCH RBC QN AUTO: 29.5 PG (ref 26–35)
MCH RBC QN AUTO: 30.2 PG (ref 26–35)
MCHC RBC AUTO-ENTMCNC: 32.9 % (ref 32–34.5)
MCHC RBC AUTO-ENTMCNC: 33.6 % (ref 32–34.5)
MCV RBC AUTO: 89.6 FL (ref 80–99.9)
MCV RBC AUTO: 90 FL (ref 80–99.9)
METHB: 0 % (ref 0–1.5)
MODE: ABNORMAL
MONOCYTES # BLD: 0.8 E9/L (ref 0.1–0.95)
MONOCYTES NFR BLD: 6.2 % (ref 2–12)
MRSA SPEC QL CULT: NORMAL
NEUTROPHILS # BLD: 11.18 E9/L (ref 1.8–7.3)
NEUTS SEG NFR BLD: 87.3 % (ref 43–80)
NITRITE UR QL STRIP: NEGATIVE
O2 CONTENT: 12.1 ML/DL
O2 SATURATION: 95.6 % (ref 92–98.5)
O2HB: 95 % (ref 94–97)
OPERATOR ID: ABNORMAL
OVALOCYTES: ABNORMAL
PATIENT TEMP: 37 C
PCO2: 40.1 MMHG (ref 35–45)
PH BLOOD GAS: 7.46 (ref 7.35–7.45)
PH UR STRIP: 5.5 [PH] (ref 5–9)
PLATELET # BLD AUTO: 106 E9/L (ref 130–450)
PLATELET # BLD AUTO: 82 E9/L (ref 130–450)
PLATELET CONFIRMATION: NORMAL
PMV BLD AUTO: 10.6 FL (ref 7–12)
PMV BLD AUTO: 11.1 FL (ref 7–12)
PO2: 79.8 MMHG (ref 75–100)
POIKILOCYTES: ABNORMAL
POTASSIUM SERPL-SCNC: 4.5 MMOL/L (ref 3.5–5)
PROT SERPL-MCNC: 4.9 G/DL (ref 6.4–8.3)
PROT UR STRIP-MCNC: 30 MG/DL
R (REACTION TIME): 5.2 MIN (ref 5–10)
RBC # BLD AUTO: 2.88 E12/L (ref 3.8–5.8)
RBC # BLD AUTO: 3.31 E12/L (ref 3.8–5.8)
RBC #/AREA URNS HPF: ABNORMAL /HPF (ref 0–2)
SODIUM SERPL-SCNC: 135 MMOL/L (ref 132–146)
SOURCE, BLOOD GAS: ABNORMAL
SP GR UR STRIP: 1.02 (ref 1–1.03)
THB: 9 G/DL (ref 11.5–16.5)
TIME ANALYZED: 2020
UROBILINOGEN UR STRIP-ACNC: 0.2 E.U./DL
WBC # BLD: 12.8 E9/L (ref 4.5–11.5)
WBC # BLD: 17.7 E9/L (ref 4.5–11.5)
WBC #/AREA URNS HPF: ABNORMAL /HPF (ref 0–5)

## 2023-05-07 PROCEDURE — 2580000003 HC RX 258

## 2023-05-07 PROCEDURE — 6360000002 HC RX W HCPCS: Performed by: STUDENT IN AN ORGANIZED HEALTH CARE EDUCATION/TRAINING PROGRAM

## 2023-05-07 PROCEDURE — 85576 BLOOD PLATELET AGGREGATION: CPT

## 2023-05-07 PROCEDURE — 2500000003 HC RX 250 WO HCPCS

## 2023-05-07 PROCEDURE — 36415 COLL VENOUS BLD VENIPUNCTURE: CPT

## 2023-05-07 PROCEDURE — 6360000002 HC RX W HCPCS: Performed by: SURGERY

## 2023-05-07 PROCEDURE — 83605 ASSAY OF LACTIC ACID: CPT

## 2023-05-07 PROCEDURE — 85018 HEMOGLOBIN: CPT

## 2023-05-07 PROCEDURE — 86901 BLOOD TYPING SEROLOGIC RH(D): CPT

## 2023-05-07 PROCEDURE — P9016 RBC LEUKOCYTES REDUCED: HCPCS

## 2023-05-07 PROCEDURE — 71045 X-RAY EXAM CHEST 1 VIEW: CPT

## 2023-05-07 PROCEDURE — 86850 RBC ANTIBODY SCREEN: CPT

## 2023-05-07 PROCEDURE — 99291 CRITICAL CARE FIRST HOUR: CPT | Performed by: SURGERY

## 2023-05-07 PROCEDURE — 85025 COMPLETE CBC W/AUTO DIFF WBC: CPT

## 2023-05-07 PROCEDURE — 2580000003 HC RX 258: Performed by: SURGERY

## 2023-05-07 PROCEDURE — 85014 HEMATOCRIT: CPT

## 2023-05-07 PROCEDURE — 37799 UNLISTED PX VASCULAR SURGERY: CPT

## 2023-05-07 PROCEDURE — 6360000002 HC RX W HCPCS

## 2023-05-07 PROCEDURE — 2580000003 HC RX 258: Performed by: STUDENT IN AN ORGANIZED HEALTH CARE EDUCATION/TRAINING PROGRAM

## 2023-05-07 PROCEDURE — 36430 TRANSFUSION BLD/BLD COMPNT: CPT

## 2023-05-07 PROCEDURE — 86900 BLOOD TYPING SEROLOGIC ABO: CPT

## 2023-05-07 PROCEDURE — 2700000000 HC OXYGEN THERAPY PER DAY

## 2023-05-07 PROCEDURE — 6370000000 HC RX 637 (ALT 250 FOR IP)

## 2023-05-07 PROCEDURE — 82805 BLOOD GASES W/O2 SATURATION: CPT

## 2023-05-07 PROCEDURE — 2500000003 HC RX 250 WO HCPCS: Performed by: STUDENT IN AN ORGANIZED HEALTH CARE EDUCATION/TRAINING PROGRAM

## 2023-05-07 PROCEDURE — 85027 COMPLETE CBC AUTOMATED: CPT

## 2023-05-07 PROCEDURE — 85384 FIBRINOGEN ACTIVITY: CPT

## 2023-05-07 PROCEDURE — 86923 COMPATIBILITY TEST ELECTRIC: CPT

## 2023-05-07 PROCEDURE — 81001 URINALYSIS AUTO W/SCOPE: CPT

## 2023-05-07 PROCEDURE — 85347 COAGULATION TIME ACTIVATED: CPT

## 2023-05-07 PROCEDURE — 80053 COMPREHEN METABOLIC PANEL: CPT

## 2023-05-07 PROCEDURE — 99222 1ST HOSP IP/OBS MODERATE 55: CPT | Performed by: NEUROLOGICAL SURGERY

## 2023-05-07 PROCEDURE — 2000000000 HC ICU R&B

## 2023-05-07 RX ORDER — SODIUM CHLORIDE, SODIUM LACTATE, POTASSIUM CHLORIDE, CALCIUM CHLORIDE 600; 310; 30; 20 MG/100ML; MG/100ML; MG/100ML; MG/100ML
INJECTION, SOLUTION INTRAVENOUS CONTINUOUS
Status: DISCONTINUED | OUTPATIENT
Start: 2023-05-07 | End: 2023-05-08

## 2023-05-07 RX ORDER — SODIUM CHLORIDE 9 MG/ML
INJECTION, SOLUTION INTRAVENOUS PRN
Status: DISCONTINUED | OUTPATIENT
Start: 2023-05-07 | End: 2023-05-10 | Stop reason: SDUPTHER

## 2023-05-07 RX ORDER — MEPERIDINE HYDROCHLORIDE 25 MG/ML
25 INJECTION INTRAMUSCULAR; INTRAVENOUS; SUBCUTANEOUS ONCE
Status: COMPLETED | OUTPATIENT
Start: 2023-05-07 | End: 2023-05-07

## 2023-05-07 RX ORDER — HYDROMORPHONE HYDROCHLORIDE 1 MG/ML
0.5 INJECTION, SOLUTION INTRAMUSCULAR; INTRAVENOUS; SUBCUTANEOUS ONCE
Status: COMPLETED | OUTPATIENT
Start: 2023-05-07 | End: 2023-05-07

## 2023-05-07 RX ORDER — SODIUM CHLORIDE, SODIUM LACTATE, POTASSIUM CHLORIDE, AND CALCIUM CHLORIDE .6; .31; .03; .02 G/100ML; G/100ML; G/100ML; G/100ML
1000 INJECTION, SOLUTION INTRAVENOUS ONCE
Status: COMPLETED | OUTPATIENT
Start: 2023-05-07 | End: 2023-05-07

## 2023-05-07 RX ADMIN — GABAPENTIN 200 MG: 100 CAPSULE ORAL at 21:44

## 2023-05-07 RX ADMIN — SODIUM CHLORIDE, PRESERVATIVE FREE 10 ML: 5 INJECTION INTRAVENOUS at 10:10

## 2023-05-07 RX ADMIN — CEFAZOLIN 2000 MG: 2 INJECTION, POWDER, FOR SOLUTION INTRAMUSCULAR; INTRAVENOUS at 16:03

## 2023-05-07 RX ADMIN — OXYCODONE HYDROCHLORIDE 10 MG: 10 TABLET ORAL at 17:14

## 2023-05-07 RX ADMIN — SODIUM CHLORIDE, POTASSIUM CHLORIDE, SODIUM LACTATE AND CALCIUM CHLORIDE: 600; 310; 30; 20 INJECTION, SOLUTION INTRAVENOUS at 00:26

## 2023-05-07 RX ADMIN — ACETAMINOPHEN 650 MG: 325 TABLET ORAL at 01:53

## 2023-05-07 RX ADMIN — SODIUM CHLORIDE 4 MILLION UNITS: 9 INJECTION, SOLUTION INTRAVENOUS at 08:40

## 2023-05-07 RX ADMIN — POLYETHYLENE GLYCOL 3350 17 G: 17 POWDER, FOR SOLUTION ORAL at 08:40

## 2023-05-07 RX ADMIN — MEPERIDINE HYDROCHLORIDE 25 MG: 25 INJECTION, SOLUTION INTRAMUSCULAR; INTRAVENOUS; SUBCUTANEOUS at 16:06

## 2023-05-07 RX ADMIN — OXYCODONE HYDROCHLORIDE 10 MG: 10 TABLET ORAL at 04:37

## 2023-05-07 RX ADMIN — HYDROMORPHONE HYDROCHLORIDE 0.5 MG: 1 INJECTION, SOLUTION INTRAMUSCULAR; INTRAVENOUS; SUBCUTANEOUS at 00:25

## 2023-05-07 RX ADMIN — HYDROMORPHONE HYDROCHLORIDE 0.5 MG: 1 INJECTION, SOLUTION INTRAMUSCULAR; INTRAVENOUS; SUBCUTANEOUS at 03:28

## 2023-05-07 RX ADMIN — CEFAZOLIN 2000 MG: 2 INJECTION, POWDER, FOR SOLUTION INTRAMUSCULAR; INTRAVENOUS at 08:40

## 2023-05-07 RX ADMIN — METHOCARBAMOL 750 MG: 750 TABLET ORAL at 17:14

## 2023-05-07 RX ADMIN — SODIUM CHLORIDE, PRESERVATIVE FREE 10 ML: 5 INJECTION INTRAVENOUS at 20:01

## 2023-05-07 RX ADMIN — DOCUSATE SODIUM AND SENNOSIDES 1 TABLET: 8.6; 5 TABLET, FILM COATED ORAL at 20:01

## 2023-05-07 RX ADMIN — METHOCARBAMOL 750 MG: 750 TABLET ORAL at 20:01

## 2023-05-07 RX ADMIN — SODIUM CHLORIDE 4 MILLION UNITS: 9 INJECTION, SOLUTION INTRAVENOUS at 12:17

## 2023-05-07 RX ADMIN — GENTAMICIN SULFATE 475 MG: 40 INJECTION, SOLUTION INTRAMUSCULAR; INTRAVENOUS at 17:54

## 2023-05-07 RX ADMIN — SODIUM CHLORIDE, POTASSIUM CHLORIDE, SODIUM LACTATE AND CALCIUM CHLORIDE 1000 ML: 600; 310; 30; 20 INJECTION, SOLUTION INTRAVENOUS at 01:24

## 2023-05-07 RX ADMIN — HYDROMORPHONE HYDROCHLORIDE 0.5 MG: 1 INJECTION, SOLUTION INTRAMUSCULAR; INTRAVENOUS; SUBCUTANEOUS at 14:14

## 2023-05-07 RX ADMIN — HYDROMORPHONE HYDROCHLORIDE 0.5 MG: 1 INJECTION, SOLUTION INTRAMUSCULAR; INTRAVENOUS; SUBCUTANEOUS at 19:30

## 2023-05-07 RX ADMIN — SODIUM CHLORIDE, PRESERVATIVE FREE 10 ML: 5 INJECTION INTRAVENOUS at 10:09

## 2023-05-07 RX ADMIN — HYDROMORPHONE HYDROCHLORIDE 0.5 MG: 1 INJECTION, SOLUTION INTRAMUSCULAR; INTRAVENOUS; SUBCUTANEOUS at 22:28

## 2023-05-07 RX ADMIN — GABAPENTIN 200 MG: 100 CAPSULE ORAL at 06:19

## 2023-05-07 RX ADMIN — SODIUM CHLORIDE, POTASSIUM CHLORIDE, SODIUM LACTATE AND CALCIUM CHLORIDE: 600; 310; 30; 20 INJECTION, SOLUTION INTRAVENOUS at 19:32

## 2023-05-07 RX ADMIN — OXYCODONE HYDROCHLORIDE 10 MG: 10 TABLET ORAL at 12:50

## 2023-05-07 RX ADMIN — ACETAMINOPHEN 650 MG: 325 TABLET ORAL at 20:01

## 2023-05-07 RX ADMIN — SODIUM CHLORIDE, PRESERVATIVE FREE 10 ML: 5 INJECTION INTRAVENOUS at 20:02

## 2023-05-07 RX ADMIN — HYDROMORPHONE HYDROCHLORIDE 0.5 MG: 1 INJECTION, SOLUTION INTRAMUSCULAR; INTRAVENOUS; SUBCUTANEOUS at 06:19

## 2023-05-07 RX ADMIN — SODIUM CHLORIDE 4 MILLION UNITS: 9 INJECTION, SOLUTION INTRAVENOUS at 20:06

## 2023-05-07 RX ADMIN — OXYCODONE HYDROCHLORIDE 10 MG: 10 TABLET ORAL at 21:16

## 2023-05-07 RX ADMIN — CEFAZOLIN 2000 MG: 2 INJECTION, POWDER, FOR SOLUTION INTRAMUSCULAR; INTRAVENOUS at 00:25

## 2023-05-07 RX ADMIN — METHOCARBAMOL 750 MG: 750 TABLET ORAL at 12:21

## 2023-05-07 RX ADMIN — SODIUM CHLORIDE 4 MILLION UNITS: 9 INJECTION, SOLUTION INTRAVENOUS at 16:01

## 2023-05-07 RX ADMIN — DOCUSATE SODIUM AND SENNOSIDES 1 TABLET: 8.6; 5 TABLET, FILM COATED ORAL at 08:39

## 2023-05-07 RX ADMIN — ACETAMINOPHEN 650 MG: 325 TABLET ORAL at 13:55

## 2023-05-07 RX ADMIN — OXYCODONE HYDROCHLORIDE 10 MG: 10 TABLET ORAL at 08:39

## 2023-05-07 RX ADMIN — ACETAMINOPHEN 650 MG: 325 TABLET ORAL at 08:39

## 2023-05-07 RX ADMIN — METHOCARBAMOL 750 MG: 750 TABLET ORAL at 08:39

## 2023-05-07 RX ADMIN — GABAPENTIN 200 MG: 100 CAPSULE ORAL at 13:54

## 2023-05-07 RX ADMIN — HYDROMORPHONE HYDROCHLORIDE 0.5 MG: 1 INJECTION, SOLUTION INTRAMUSCULAR; INTRAVENOUS; SUBCUTANEOUS at 10:03

## 2023-05-07 ASSESSMENT — PAIN DESCRIPTION - ORIENTATION
ORIENTATION: RIGHT;MID;LEFT
ORIENTATION: RIGHT;MID
ORIENTATION: RIGHT;MID
ORIENTATION: MID;RIGHT
ORIENTATION: RIGHT;MID

## 2023-05-07 ASSESSMENT — PAIN DESCRIPTION - DESCRIPTORS
DESCRIPTORS: SHOOTING;THROBBING
DESCRIPTORS: ACHING;DISCOMFORT;THROBBING;TENDER
DESCRIPTORS: ACHING;STABBING;THROBBING
DESCRIPTORS: ACHING;THROBBING;SORE;DISCOMFORT

## 2023-05-07 ASSESSMENT — PAIN DESCRIPTION - FREQUENCY
FREQUENCY: CONTINUOUS

## 2023-05-07 ASSESSMENT — PAIN SCALES - GENERAL
PAINLEVEL_OUTOF10: 8
PAINLEVEL_OUTOF10: 9
PAINLEVEL_OUTOF10: 9
PAINLEVEL_OUTOF10: 8
PAINLEVEL_OUTOF10: 7
PAINLEVEL_OUTOF10: 8
PAINLEVEL_OUTOF10: 10
PAINLEVEL_OUTOF10: 10
PAINLEVEL_OUTOF10: 7
PAINLEVEL_OUTOF10: 9
PAINLEVEL_OUTOF10: 7
PAINLEVEL_OUTOF10: 9
PAINLEVEL_OUTOF10: 8

## 2023-05-07 ASSESSMENT — PAIN DESCRIPTION - LOCATION
LOCATION: LEG;PELVIS
LOCATION: LEG;PELVIS
LOCATION: PELVIS;BACK
LOCATION: LEG;PELVIS

## 2023-05-07 ASSESSMENT — PAIN DESCRIPTION - PAIN TYPE
TYPE: ACUTE PAIN;SURGICAL PAIN
TYPE: ACUTE PAIN;SURGICAL PAIN

## 2023-05-07 ASSESSMENT — PAIN DESCRIPTION - ONSET
ONSET: ON-GOING

## 2023-05-07 ASSESSMENT — PAIN - FUNCTIONAL ASSESSMENT
PAIN_FUNCTIONAL_ASSESSMENT: PREVENTS OR INTERFERES SOME ACTIVE ACTIVITIES AND ADLS
PAIN_FUNCTIONAL_ASSESSMENT: PREVENTS OR INTERFERES SOME ACTIVE ACTIVITIES AND ADLS

## 2023-05-08 ENCOUNTER — ANESTHESIA EVENT (OUTPATIENT)
Dept: OPERATING ROOM | Age: 38
End: 2023-05-08
Payer: COMMERCIAL

## 2023-05-08 ENCOUNTER — APPOINTMENT (OUTPATIENT)
Dept: GENERAL RADIOLOGY | Age: 38
End: 2023-05-08
Payer: COMMERCIAL

## 2023-05-08 ENCOUNTER — APPOINTMENT (OUTPATIENT)
Dept: CT IMAGING | Age: 38
End: 2023-05-08
Payer: COMMERCIAL

## 2023-05-08 ENCOUNTER — ANESTHESIA (OUTPATIENT)
Dept: OPERATING ROOM | Age: 38
End: 2023-05-08
Payer: COMMERCIAL

## 2023-05-08 PROBLEM — E87.20 LACTIC ACIDEMIA: Status: ACTIVE | Noted: 2023-05-08

## 2023-05-08 PROBLEM — E87.20 LACTIC ACIDOSIS: Status: RESOLVED | Noted: 2023-05-06 | Resolved: 2023-05-08

## 2023-05-08 PROBLEM — J96.01 ACUTE RESPIRATORY FAILURE WITH HYPOXIA (HCC): Status: ACTIVE | Noted: 2023-05-08

## 2023-05-08 LAB
ALBUMIN SERPL-MCNC: 2.6 G/DL (ref 3.5–5.2)
ALP SERPL-CCNC: 62 U/L (ref 40–129)
ALT SERPL-CCNC: 49 U/L (ref 0–40)
ANION GAP SERPL CALCULATED.3IONS-SCNC: 6 MMOL/L (ref 7–16)
ANION GAP SERPL CALCULATED.3IONS-SCNC: 7 MMOL/L (ref 7–16)
ANION GAP: 12 MMOL/L (ref 7–16)
ANISOCYTOSIS: ABNORMAL
AST SERPL-CCNC: 185 U/L (ref 0–39)
B.E.: 3.4 MMOL/L (ref -3–3)
BASOPHILIC STIPPLING: ABNORMAL
BASOPHILIC STIPPLING: ABNORMAL
BASOPHILS # BLD: 0 E9/L (ref 0–0.2)
BASOPHILS # BLD: 0 E9/L (ref 0–0.2)
BASOPHILS # BLD: 0.01 E9/L (ref 0–0.2)
BASOPHILS NFR BLD: 0.1 % (ref 0–2)
BILIRUB SERPL-MCNC: 0.7 MG/DL (ref 0–1.2)
BLOOD BANK DISPENSE STATUS: NORMAL
BLOOD BANK PRODUCT CODE: NORMAL
BPU ID: NORMAL
BUN SERPL-MCNC: 11 MG/DL (ref 6–20)
BUN SERPL-MCNC: 13 MG/DL (ref 6–20)
BURR CELLS: ABNORMAL
CA-I BLD-SCNC: 1.25 MMOL/L (ref 1.15–1.33)
CALCIUM SERPL-MCNC: 8.4 MG/DL (ref 8.6–10.2)
CALCIUM SERPL-MCNC: 8.8 MG/DL (ref 8.6–10.2)
CARDIOPULMONARY BYPASS: NO
CHLORIDE SERPL-SCNC: 96 MMOL/L (ref 98–107)
CHLORIDE SERPL-SCNC: 98 MMOL/L (ref 98–107)
CO2 SERPL-SCNC: 30 MMOL/L (ref 22–29)
CO2 SERPL-SCNC: 32 MMOL/L (ref 22–29)
CREAT SERPL-MCNC: 0.7 MG/DL (ref 0.7–1.2)
CREAT SERPL-MCNC: 0.8 MG/DL (ref 0.7–1.2)
DESCRIPTION BLOOD BANK: NORMAL
DEVICE: ABNORMAL
DOHLE BODIES: ABNORMAL
EOSINOPHIL # BLD: 0 E9/L (ref 0.05–0.5)
EOSINOPHIL NFR BLD: 0 % (ref 0–6)
EOSINOPHIL NFR BLD: 0 % (ref 0–6)
EOSINOPHIL NFR BLD: 0.1 % (ref 0–6)
ERYTHROCYTE [DISTWIDTH] IN BLOOD BY AUTOMATED COUNT: 13.7 FL (ref 11.5–15)
ERYTHROCYTE [DISTWIDTH] IN BLOOD BY AUTOMATED COUNT: 13.9 FL (ref 11.5–15)
ERYTHROCYTE [DISTWIDTH] IN BLOOD BY AUTOMATED COUNT: 14 FL (ref 11.5–15)
FIO2: 50
GENTAMICIN DOSE AMOUNT: NORMAL
GENTAMICIN TROUGH: 0.3 MCG/ML (ref 0–2)
GFR, ESTIMATED: >60 ML/MIN/1.73
GLUCOSE BLD-MCNC: 126 MG/DL (ref 74–99)
GLUCOSE SERPL-MCNC: 132 MG/DL (ref 74–99)
GLUCOSE SERPL-MCNC: 166 MG/DL (ref 74–99)
HCO3: 27.4 MMOL/L (ref 22–26)
HCT VFR BLD AUTO: 25.1 % (ref 37–54)
HCT VFR BLD AUTO: 26.3 % (ref 37–54)
HCT VFR BLD AUTO: 26.7 % (ref 37–54)
HEMATOCRIT: 21 % (ref 37–54)
HEMOGLOBIN: 7 G/DL (ref 12.5–15.5)
HGB BLD-MCNC: 8.4 G/DL (ref 12.5–16.5)
HGB BLD-MCNC: 8.6 G/DL (ref 12.5–16.5)
HGB BLD-MCNC: 8.8 G/DL (ref 12.5–16.5)
HYPOCHROMIA: ABNORMAL
IMM GRANULOCYTES # BLD: 0.07 E9/L
IMM GRANULOCYTES NFR BLD: 0.5 % (ref 0–5)
LACTATE BLDV-SCNC: 1.7 MMOL/L (ref 0.5–2.2)
LACTATE BLDV-SCNC: 2.2 MMOL/L (ref 0.5–2.2)
LYMPHOCYTES # BLD: 0 E9/L (ref 1.5–4)
LYMPHOCYTES # BLD: 0.87 E9/L (ref 1.5–4)
LYMPHOCYTES # BLD: 0.97 E9/L (ref 1.5–4)
LYMPHOCYTES NFR BLD: 3 % (ref 20–42)
LYMPHOCYTES NFR BLD: 6.3 % (ref 20–42)
LYMPHOCYTES NFR BLD: 7 % (ref 20–42)
MAGNESIUM SERPL-MCNC: 1.6 MG/DL (ref 1.6–2.6)
MCH RBC QN AUTO: 28.9 PG (ref 26–35)
MCH RBC QN AUTO: 29.6 PG (ref 26–35)
MCH RBC QN AUTO: 29.9 PG (ref 26–35)
MCHC RBC AUTO-ENTMCNC: 32.7 % (ref 32–34.5)
MCHC RBC AUTO-ENTMCNC: 33 % (ref 32–34.5)
MCHC RBC AUTO-ENTMCNC: 33.5 % (ref 32–34.5)
MCV RBC AUTO: 88.3 FL (ref 80–99.9)
MCV RBC AUTO: 89.3 FL (ref 80–99.9)
MCV RBC AUTO: 89.9 FL (ref 80–99.9)
MONOCYTES # BLD: 0.28 E9/L (ref 0.1–0.95)
MONOCYTES # BLD: 0.44 E9/L (ref 0.1–0.95)
MONOCYTES # BLD: 0.64 E9/L (ref 0.1–0.95)
MONOCYTES NFR BLD: 1.8 % (ref 2–12)
MONOCYTES NFR BLD: 2.6 % (ref 2–12)
MONOCYTES NFR BLD: 4.6 % (ref 2–12)
NEUTROPHILS # BLD: 12.31 E9/L (ref 1.8–7.3)
NEUTROPHILS # BLD: 12.65 E9/L (ref 1.8–7.3)
NEUTROPHILS # BLD: 14.07 E9/L (ref 1.8–7.3)
NEUTS SEG NFR BLD: 88.5 % (ref 43–80)
NEUTS SEG NFR BLD: 91.2 % (ref 43–80)
NEUTS SEG NFR BLD: 97.4 % (ref 43–80)
O2 SATURATION: 99.3 % (ref 92–98.5)
OPERATOR ID: ABNORMAL
OVALOCYTES: ABNORMAL
OVALOCYTES: ABNORMAL
PCO2 37: 37.8 MMHG (ref 35–45)
PH 37: 7.47 (ref 7.35–7.45)
PHOSPHATE SERPL-MCNC: 2.5 MG/DL (ref 2.5–4.5)
PLATELET # BLD AUTO: 89 E9/L (ref 130–450)
PLATELET # BLD AUTO: 91 E9/L (ref 130–450)
PLATELET # BLD AUTO: 97 E9/L (ref 130–450)
PLATELET CONFIRMATION: NORMAL
PMV BLD AUTO: 10.2 FL (ref 7–12)
PMV BLD AUTO: 10.4 FL (ref 7–12)
PMV BLD AUTO: 11.1 FL (ref 7–12)
PO2 37: 137.3 MMHG (ref 80–100)
POC BUN: 10 MG/DL (ref 8–23)
POC CHLORIDE: 97 MMOL/L (ref 100–108)
POC CO2: 26.3 MMOL/L (ref 22–29)
POC CREATININE: 0.8 MG/DL (ref 0.7–1.2)
POC IONIZED CALCIUM: 1.1 (ref 1.1–1.3)
POC LACTIC ACID: 1 (ref 0.5–2.2)
POC SODIUM: 135 MMOL/L (ref 132–146)
POC SOURCE: ABNORMAL
POIKILOCYTES: ABNORMAL
POIKILOCYTES: ABNORMAL
POLYCHROMASIA: ABNORMAL
POTASSIUM SERPL-SCNC: 4 MMOL/L (ref 3.5–5.5)
POTASSIUM SERPL-SCNC: 4.3 MMOL/L (ref 3.5–5)
POTASSIUM SERPL-SCNC: 4.3 MMOL/L (ref 3.5–5)
PROT SERPL-MCNC: 5.3 G/DL (ref 6.4–8.3)
RBC # BLD AUTO: 2.81 E12/L (ref 3.8–5.8)
RBC # BLD AUTO: 2.97 E12/L (ref 3.8–5.8)
RBC # BLD AUTO: 2.98 E12/L (ref 3.8–5.8)
SODIUM SERPL-SCNC: 133 MMOL/L (ref 132–146)
SODIUM SERPL-SCNC: 136 MMOL/L (ref 132–146)
VACUOLATED NEUTROPHILS: ABNORMAL
WBC # BLD: 13.9 E9/L (ref 4.5–11.5)
WBC # BLD: 13.9 E9/L (ref 4.5–11.5)
WBC # BLD: 14.5 E9/L (ref 4.5–11.5)

## 2023-05-08 PROCEDURE — 6360000002 HC RX W HCPCS

## 2023-05-08 PROCEDURE — 87040 BLOOD CULTURE FOR BACTERIA: CPT

## 2023-05-08 PROCEDURE — 6360000002 HC RX W HCPCS: Performed by: SURGERY

## 2023-05-08 PROCEDURE — P9041 ALBUMIN (HUMAN),5%, 50ML: HCPCS

## 2023-05-08 PROCEDURE — 83605 ASSAY OF LACTIC ACID: CPT

## 2023-05-08 PROCEDURE — 6360000004 HC RX CONTRAST MEDICATION: Performed by: RADIOLOGY

## 2023-05-08 PROCEDURE — 3600000015 HC SURGERY LEVEL 5 ADDTL 15MIN: Performed by: ORTHOPAEDIC SURGERY

## 2023-05-08 PROCEDURE — 99291 CRITICAL CARE FIRST HOUR: CPT | Performed by: SURGERY

## 2023-05-08 PROCEDURE — 6360000002 HC RX W HCPCS: Performed by: STUDENT IN AN ORGANIZED HEALTH CARE EDUCATION/TRAINING PROGRAM

## 2023-05-08 PROCEDURE — C1713 ANCHOR/SCREW BN/BN,TIS/BN: HCPCS | Performed by: ORTHOPAEDIC SURGERY

## 2023-05-08 PROCEDURE — 3700000001 HC ADD 15 MINUTES (ANESTHESIA): Performed by: ORTHOPAEDIC SURGERY

## 2023-05-08 PROCEDURE — 2700000000 HC OXYGEN THERAPY PER DAY

## 2023-05-08 PROCEDURE — 80048 BASIC METABOLIC PNL TOTAL CA: CPT

## 2023-05-08 PROCEDURE — 2500000003 HC RX 250 WO HCPCS

## 2023-05-08 PROCEDURE — C1769 GUIDE WIRE: HCPCS | Performed by: ORTHOPAEDIC SURGERY

## 2023-05-08 PROCEDURE — 80170 ASSAY OF GENTAMICIN: CPT

## 2023-05-08 PROCEDURE — 3600000005 HC SURGERY LEVEL 5 BASE: Performed by: ORTHOPAEDIC SURGERY

## 2023-05-08 PROCEDURE — 0QB90ZZ EXCISION OF LEFT FEMORAL SHAFT, OPEN APPROACH: ICD-10-PCS | Performed by: ORTHOPAEDIC SURGERY

## 2023-05-08 PROCEDURE — P9016 RBC LEUKOCYTES REDUCED: HCPCS

## 2023-05-08 PROCEDURE — 84100 ASSAY OF PHOSPHORUS: CPT

## 2023-05-08 PROCEDURE — 6370000000 HC RX 637 (ALT 250 FOR IP)

## 2023-05-08 PROCEDURE — 82803 BLOOD GASES ANY COMBINATION: CPT

## 2023-05-08 PROCEDURE — 2580000003 HC RX 258

## 2023-05-08 PROCEDURE — 7100000001 HC PACU RECOVERY - ADDTL 15 MIN

## 2023-05-08 PROCEDURE — 3700000000 HC ANESTHESIA ATTENDED CARE: Performed by: ORTHOPAEDIC SURGERY

## 2023-05-08 PROCEDURE — 6370000000 HC RX 637 (ALT 250 FOR IP): Performed by: STUDENT IN AN ORGANIZED HEALTH CARE EDUCATION/TRAINING PROGRAM

## 2023-05-08 PROCEDURE — 7100000000 HC PACU RECOVERY - FIRST 15 MIN

## 2023-05-08 PROCEDURE — 75635 CT ANGIO ABDOMINAL ARTERIES: CPT

## 2023-05-08 PROCEDURE — 27218 TREAT PELVIC RING FRACTURE: CPT | Performed by: ORTHOPAEDIC SURGERY

## 2023-05-08 PROCEDURE — 71275 CT ANGIOGRAPHY CHEST: CPT

## 2023-05-08 PROCEDURE — 36415 COLL VENOUS BLD VENIPUNCTURE: CPT

## 2023-05-08 PROCEDURE — 72170 X-RAY EXAM OF PELVIS: CPT

## 2023-05-08 PROCEDURE — 74018 RADEX ABDOMEN 1 VIEW: CPT

## 2023-05-08 PROCEDURE — 2580000003 HC RX 258: Performed by: STUDENT IN AN ORGANIZED HEALTH CARE EDUCATION/TRAINING PROGRAM

## 2023-05-08 PROCEDURE — 2709999900 HC NON-CHARGEABLE SUPPLY: Performed by: ORTHOPAEDIC SURGERY

## 2023-05-08 PROCEDURE — 20694 RMVL EXT FIXJ SYS UNDER ANES: CPT | Performed by: ORTHOPAEDIC SURGERY

## 2023-05-08 PROCEDURE — 3209999900 FLUORO FOR SURGICAL PROCEDURES

## 2023-05-08 PROCEDURE — 85025 COMPLETE CBC W/AUTO DIFF WBC: CPT

## 2023-05-08 PROCEDURE — 2720000010 HC SURG SUPPLY STERILE: Performed by: ORTHOPAEDIC SURGERY

## 2023-05-08 PROCEDURE — 82330 ASSAY OF CALCIUM: CPT

## 2023-05-08 PROCEDURE — 2580000003 HC RX 258: Performed by: SURGERY

## 2023-05-08 PROCEDURE — 83735 ASSAY OF MAGNESIUM: CPT

## 2023-05-08 PROCEDURE — 11012 DEB SKIN BONE AT FX SITE: CPT | Performed by: ORTHOPAEDIC SURGERY

## 2023-05-08 PROCEDURE — 80053 COMPREHEN METABOLIC PANEL: CPT

## 2023-05-08 PROCEDURE — 37799 UNLISTED PX VASCULAR SURGERY: CPT

## 2023-05-08 PROCEDURE — 27506 TREATMENT OF THIGH FRACTURE: CPT | Performed by: ORTHOPAEDIC SURGERY

## 2023-05-08 PROCEDURE — 73552 X-RAY EXAM OF FEMUR 2/>: CPT

## 2023-05-08 PROCEDURE — 2000000000 HC ICU R&B

## 2023-05-08 PROCEDURE — 2500000003 HC RX 250 WO HCPCS: Performed by: STUDENT IN AN ORGANIZED HEALTH CARE EDUCATION/TRAINING PROGRAM

## 2023-05-08 DEVICE — IMPLANTABLE DEVICE: Type: IMPLANTABLE DEVICE | Site: FEMUR | Status: FUNCTIONAL

## 2023-05-08 DEVICE — SCREW LK F/IM NAIL 3X36MM XL25 ST: Type: IMPLANTABLE DEVICE | Site: FEMUR | Status: FUNCTIONAL

## 2023-05-08 DEVICE — SCREW LK FOR IM NAIL 5X28MM XL25 SILX: Type: IMPLANTABLE DEVICE | Site: FEMUR | Status: FUNCTIONAL

## 2023-05-08 DEVICE — SCREW BNE LCK 5X58 MM TI STRL RFNADVANCED 04045058S: Type: IMPLANTABLE DEVICE | Site: FEMUR | Status: FUNCTIONAL

## 2023-05-08 DEVICE — SCREW BNE L90MM DIA7.3MM THRD L32MM CANC S STL SELF DRL ST: Type: IMPLANTABLE DEVICE | Site: PELVIS | Status: FUNCTIONAL

## 2023-05-08 DEVICE — WASHER ORTH DIA13MM FOR CANN SCR: Type: IMPLANTABLE DEVICE | Site: PELVIS | Status: FUNCTIONAL

## 2023-05-08 DEVICE — SCREW BNE LCK 5X80 MM TI STRL RFNADVANCED 04045080S: Type: IMPLANTABLE DEVICE | Site: FEMUR | Status: FUNCTIONAL

## 2023-05-08 RX ORDER — LIDOCAINE HYDROCHLORIDE ANHYDROUS AND DEXTROSE MONOHYDRATE 5; 400 G/100ML; MG/100ML
INJECTION, SOLUTION INTRAVENOUS CONTINUOUS PRN
Status: DISCONTINUED | OUTPATIENT
Start: 2023-05-08 | End: 2023-05-08 | Stop reason: SDUPTHER

## 2023-05-08 RX ORDER — BACITRACIN, NEOMYCIN, POLYMYXIN B 400; 3.5; 5 [USP'U]/G; MG/G; [USP'U]/G
OINTMENT TOPICAL 2 TIMES DAILY
Status: DISCONTINUED | OUTPATIENT
Start: 2023-05-08 | End: 2023-05-18 | Stop reason: HOSPADM

## 2023-05-08 RX ORDER — OXYCODONE HYDROCHLORIDE AND ACETAMINOPHEN 5; 325 MG/1; MG/1
1 TABLET ORAL EVERY 6 HOURS PRN
Qty: 28 TABLET | Refills: 0 | Status: SHIPPED | OUTPATIENT
Start: 2023-05-08 | End: 2023-05-15

## 2023-05-08 RX ORDER — MAGNESIUM SULFATE HEPTAHYDRATE 40 MG/ML
INJECTION, SOLUTION INTRAVENOUS PRN
Status: DISCONTINUED | OUTPATIENT
Start: 2023-05-08 | End: 2023-05-08 | Stop reason: SDUPTHER

## 2023-05-08 RX ORDER — SODIUM CHLORIDE 0.9 % (FLUSH) 0.9 %
5-40 SYRINGE (ML) INJECTION PRN
Status: DISCONTINUED | OUTPATIENT
Start: 2023-05-08 | End: 2023-05-18 | Stop reason: HOSPADM

## 2023-05-08 RX ORDER — LABETALOL HYDROCHLORIDE 5 MG/ML
INJECTION, SOLUTION INTRAVENOUS PRN
Status: DISCONTINUED | OUTPATIENT
Start: 2023-05-08 | End: 2023-05-08 | Stop reason: SDUPTHER

## 2023-05-08 RX ORDER — METHOCARBAMOL 750 MG/1
1500 TABLET, FILM COATED ORAL 4 TIMES DAILY
Status: DISCONTINUED | OUTPATIENT
Start: 2023-05-08 | End: 2023-05-09

## 2023-05-08 RX ORDER — SODIUM CHLORIDE 9 MG/ML
INJECTION, SOLUTION INTRAVENOUS PRN
Status: DISCONTINUED | OUTPATIENT
Start: 2023-05-08 | End: 2023-05-18 | Stop reason: HOSPADM

## 2023-05-08 RX ORDER — ENOXAPARIN SODIUM 100 MG/ML
30 INJECTION SUBCUTANEOUS 2 TIMES DAILY
Qty: 18 ML | Refills: 0 | Status: ON HOLD | OUTPATIENT
Start: 2023-05-08 | End: 2023-06-07

## 2023-05-08 RX ORDER — KETAMINE HCL IN NACL, ISO-OSM 100MG/10ML
SYRINGE (ML) INJECTION PRN
Status: DISCONTINUED | OUTPATIENT
Start: 2023-05-08 | End: 2023-05-08 | Stop reason: SDUPTHER

## 2023-05-08 RX ORDER — SODIUM CHLORIDE 0.9 % (FLUSH) 0.9 %
5-40 SYRINGE (ML) INJECTION EVERY 12 HOURS SCHEDULED
Status: DISCONTINUED | OUTPATIENT
Start: 2023-05-08 | End: 2023-05-18 | Stop reason: HOSPADM

## 2023-05-08 RX ORDER — MIDAZOLAM HYDROCHLORIDE 1 MG/ML
INJECTION INTRAMUSCULAR; INTRAVENOUS PRN
Status: DISCONTINUED | OUTPATIENT
Start: 2023-05-08 | End: 2023-05-08 | Stop reason: SDUPTHER

## 2023-05-08 RX ORDER — ALBUMIN, HUMAN INJ 5% 5 %
SOLUTION INTRAVENOUS PRN
Status: DISCONTINUED | OUTPATIENT
Start: 2023-05-08 | End: 2023-05-08 | Stop reason: SDUPTHER

## 2023-05-08 RX ORDER — HYDROMORPHONE HYDROCHLORIDE 1 MG/ML
0.5 INJECTION, SOLUTION INTRAMUSCULAR; INTRAVENOUS; SUBCUTANEOUS ONCE
Status: COMPLETED | OUTPATIENT
Start: 2023-05-08 | End: 2023-05-08

## 2023-05-08 RX ORDER — METOPROLOL TARTRATE 5 MG/5ML
INJECTION INTRAVENOUS PRN
Status: DISCONTINUED | OUTPATIENT
Start: 2023-05-08 | End: 2023-05-08 | Stop reason: SDUPTHER

## 2023-05-08 RX ORDER — ROCURONIUM BROMIDE 10 MG/ML
INJECTION, SOLUTION INTRAVENOUS PRN
Status: DISCONTINUED | OUTPATIENT
Start: 2023-05-08 | End: 2023-05-08 | Stop reason: SDUPTHER

## 2023-05-08 RX ORDER — BISACODYL 10 MG
10 SUPPOSITORY, RECTAL RECTAL DAILY
Status: DISCONTINUED | OUTPATIENT
Start: 2023-05-08 | End: 2023-05-16

## 2023-05-08 RX ORDER — ENOXAPARIN SODIUM 100 MG/ML
30 INJECTION SUBCUTANEOUS 2 TIMES DAILY
Status: DISCONTINUED | OUTPATIENT
Start: 2023-05-08 | End: 2023-05-18 | Stop reason: HOSPADM

## 2023-05-08 RX ORDER — SODIUM CHLORIDE 9 MG/ML
INJECTION, SOLUTION INTRAVENOUS CONTINUOUS PRN
Status: DISCONTINUED | OUTPATIENT
Start: 2023-05-08 | End: 2023-05-08 | Stop reason: SDUPTHER

## 2023-05-08 RX ORDER — DEXAMETHASONE SODIUM PHOSPHATE 10 MG/ML
INJECTION INTRAMUSCULAR; INTRAVENOUS PRN
Status: DISCONTINUED | OUTPATIENT
Start: 2023-05-08 | End: 2023-05-08 | Stop reason: SDUPTHER

## 2023-05-08 RX ORDER — SODIUM CHLORIDE 0.9 % (FLUSH) 0.9 %
10 SYRINGE (ML) INJECTION
Status: ACTIVE | OUTPATIENT
Start: 2023-05-08 | End: 2023-05-09

## 2023-05-08 RX ORDER — MAGNESIUM SULFATE IN WATER 40 MG/ML
2000 INJECTION, SOLUTION INTRAVENOUS ONCE
Status: COMPLETED | OUTPATIENT
Start: 2023-05-08 | End: 2023-05-08

## 2023-05-08 RX ORDER — CEFAZOLIN SODIUM 1 G/3ML
INJECTION, POWDER, FOR SOLUTION INTRAMUSCULAR; INTRAVENOUS PRN
Status: DISCONTINUED | OUTPATIENT
Start: 2023-05-08 | End: 2023-05-08 | Stop reason: SDUPTHER

## 2023-05-08 RX ORDER — ONDANSETRON 2 MG/ML
INJECTION INTRAMUSCULAR; INTRAVENOUS PRN
Status: DISCONTINUED | OUTPATIENT
Start: 2023-05-08 | End: 2023-05-08 | Stop reason: SDUPTHER

## 2023-05-08 RX ORDER — DEXTROSE AND SODIUM CHLORIDE 5; .45 G/100ML; G/100ML
INJECTION, SOLUTION INTRAVENOUS CONTINUOUS
Status: DISCONTINUED | OUTPATIENT
Start: 2023-05-08 | End: 2023-05-10

## 2023-05-08 RX ORDER — BACITRACIN ZINC 500 [USP'U]/G
OINTMENT TOPICAL 3 TIMES DAILY
Status: DISCONTINUED | OUTPATIENT
Start: 2023-05-08 | End: 2023-05-18 | Stop reason: HOSPADM

## 2023-05-08 RX ORDER — GABAPENTIN 300 MG/1
300 CAPSULE ORAL EVERY 8 HOURS
Status: DISCONTINUED | OUTPATIENT
Start: 2023-05-08 | End: 2023-05-15

## 2023-05-08 RX ORDER — LIDOCAINE HYDROCHLORIDE 20 MG/ML
INJECTION, SOLUTION INTRAVENOUS PRN
Status: DISCONTINUED | OUTPATIENT
Start: 2023-05-08 | End: 2023-05-08 | Stop reason: SDUPTHER

## 2023-05-08 RX ORDER — PROPOFOL 10 MG/ML
INJECTION, EMULSION INTRAVENOUS PRN
Status: DISCONTINUED | OUTPATIENT
Start: 2023-05-08 | End: 2023-05-08 | Stop reason: SDUPTHER

## 2023-05-08 RX ORDER — SUFENTANIL CITRATE 50 UG/ML
INJECTION EPIDURAL; INTRAVENOUS PRN
Status: DISCONTINUED | OUTPATIENT
Start: 2023-05-08 | End: 2023-05-08 | Stop reason: SDUPTHER

## 2023-05-08 RX ADMIN — METHOCARBAMOL 750 MG: 750 TABLET ORAL at 09:13

## 2023-05-08 RX ADMIN — METOPROLOL TARTRATE 2.5 MG: 5 INJECTION, SOLUTION INTRAVENOUS at 13:15

## 2023-05-08 RX ADMIN — SODIUM CHLORIDE 4 MILLION UNITS: 9 INJECTION, SOLUTION INTRAVENOUS at 03:46

## 2023-05-08 RX ADMIN — GABAPENTIN 300 MG: 300 CAPSULE ORAL at 22:20

## 2023-05-08 RX ADMIN — HYDROMORPHONE HYDROCHLORIDE 0.5 MG: 1 INJECTION, SOLUTION INTRAMUSCULAR; INTRAVENOUS; SUBCUTANEOUS at 05:15

## 2023-05-08 RX ADMIN — DOCUSATE SODIUM AND SENNOSIDES 1 TABLET: 8.6; 5 TABLET, FILM COATED ORAL at 09:13

## 2023-05-08 RX ADMIN — ACETAMINOPHEN 650 MG: 325 TABLET ORAL at 20:30

## 2023-05-08 RX ADMIN — SODIUM CHLORIDE 4 MILLION UNITS: 9 INJECTION, SOLUTION INTRAVENOUS at 16:11

## 2023-05-08 RX ADMIN — SODIUM CHLORIDE, PRESERVATIVE FREE 10 ML: 5 INJECTION INTRAVENOUS at 09:13

## 2023-05-08 RX ADMIN — METOPROLOL TARTRATE 2.5 MG: 5 INJECTION, SOLUTION INTRAVENOUS at 13:20

## 2023-05-08 RX ADMIN — SODIUM CHLORIDE: 9 INJECTION, SOLUTION INTRAVENOUS at 11:55

## 2023-05-08 RX ADMIN — LIDOCAINE HYDROCHLORIDE 100 MG: 20 INJECTION, SOLUTION INTRAVENOUS at 12:04

## 2023-05-08 RX ADMIN — POLYMYXIN B SULFATE, BACITRACIN ZINC, NEOMYCIN SULFATE: 5000; 3.5; 4 OINTMENT TOPICAL at 21:11

## 2023-05-08 RX ADMIN — ALBUMIN (HUMAN) 25 G: 12.5 INJECTION, SOLUTION INTRAVENOUS at 12:15

## 2023-05-08 RX ADMIN — ACETAMINOPHEN 650 MG: 325 TABLET ORAL at 01:13

## 2023-05-08 RX ADMIN — GABAPENTIN 300 MG: 300 CAPSULE ORAL at 17:00

## 2023-05-08 RX ADMIN — Medication 0.25 MCG/KG/HR: at 12:15

## 2023-05-08 RX ADMIN — OXYCODONE HYDROCHLORIDE 10 MG: 10 TABLET ORAL at 01:13

## 2023-05-08 RX ADMIN — SODIUM CHLORIDE, PRESERVATIVE FREE 10 ML: 5 INJECTION INTRAVENOUS at 22:20

## 2023-05-08 RX ADMIN — SODIUM CHLORIDE 4 MILLION UNITS: 9 INJECTION, SOLUTION INTRAVENOUS at 20:30

## 2023-05-08 RX ADMIN — OXYCODONE HYDROCHLORIDE 10 MG: 10 TABLET ORAL at 19:45

## 2023-05-08 RX ADMIN — Medication 10 MG: at 15:48

## 2023-05-08 RX ADMIN — HYDROMORPHONE HYDROCHLORIDE 0.5 MG: 1 INJECTION, SOLUTION INTRAMUSCULAR; INTRAVENOUS; SUBCUTANEOUS at 20:58

## 2023-05-08 RX ADMIN — MAGNESIUM SULFATE HEPTAHYDRATE 2000 MG: 40 INJECTION, SOLUTION INTRAVENOUS at 17:47

## 2023-05-08 RX ADMIN — SUFENTANIL CITRATE 20 MCG: 50 INJECTION EPIDURAL; INTRAVENOUS at 12:28

## 2023-05-08 RX ADMIN — ROCURONIUM BROMIDE 20 MG: 10 INJECTION, SOLUTION INTRAVENOUS at 13:06

## 2023-05-08 RX ADMIN — MAGNESIUM SULFATE HEPTAHYDRATE 2000 MG: 40 INJECTION, SOLUTION INTRAVENOUS at 12:06

## 2023-05-08 RX ADMIN — POLYETHYLENE GLYCOL 3350 17 G: 17 POWDER, FOR SOLUTION ORAL at 17:43

## 2023-05-08 RX ADMIN — HYDROMORPHONE HYDROCHLORIDE 0.5 MG: 1 INJECTION, SOLUTION INTRAMUSCULAR; INTRAVENOUS; SUBCUTANEOUS at 02:14

## 2023-05-08 RX ADMIN — SODIUM CHLORIDE, POTASSIUM CHLORIDE, SODIUM LACTATE AND CALCIUM CHLORIDE: 600; 310; 30; 20 INJECTION, SOLUTION INTRAVENOUS at 07:04

## 2023-05-08 RX ADMIN — LIDOCAINE HYDROCHLORIDE 2 MG/KG/HR: 4 INJECTION, SOLUTION INTRAVENOUS at 12:05

## 2023-05-08 RX ADMIN — HYDROMORPHONE HYDROCHLORIDE 0.5 MG: 1 INJECTION, SOLUTION INTRAMUSCULAR; INTRAVENOUS; SUBCUTANEOUS at 07:53

## 2023-05-08 RX ADMIN — CEFAZOLIN 2000 MG: 2 INJECTION, POWDER, FOR SOLUTION INTRAMUSCULAR; INTRAVENOUS at 15:57

## 2023-05-08 RX ADMIN — SUFENTANIL CITRATE 20 MCG: 50 INJECTION EPIDURAL; INTRAVENOUS at 12:04

## 2023-05-08 RX ADMIN — HYDROMORPHONE HYDROCHLORIDE 0.5 MG: 1 INJECTION, SOLUTION INTRAMUSCULAR; INTRAVENOUS; SUBCUTANEOUS at 11:25

## 2023-05-08 RX ADMIN — GENTAMICIN SULFATE 475 MG: 40 INJECTION, SOLUTION INTRAMUSCULAR; INTRAVENOUS at 18:23

## 2023-05-08 RX ADMIN — PROPOFOL 150 MG: 10 INJECTION, EMULSION INTRAVENOUS at 12:04

## 2023-05-08 RX ADMIN — ROCURONIUM BROMIDE 50 MG: 10 INJECTION, SOLUTION INTRAVENOUS at 12:04

## 2023-05-08 RX ADMIN — DEXTROSE AND SODIUM CHLORIDE: 5; 450 INJECTION, SOLUTION INTRAVENOUS at 16:09

## 2023-05-08 RX ADMIN — POLYMYXIN B SULFATE, BACITRACIN ZINC, NEOMYCIN SULFATE: 5000; 3.5; 4 OINTMENT TOPICAL at 15:48

## 2023-05-08 RX ADMIN — ONDANSETRON HYDROCHLORIDE 4 MG: 2 SOLUTION INTRAMUSCULAR; INTRAVENOUS at 14:46

## 2023-05-08 RX ADMIN — ENOXAPARIN SODIUM 30 MG: 100 INJECTION SUBCUTANEOUS at 21:11

## 2023-05-08 RX ADMIN — BACITRACIN ZINC: 500 OINTMENT TOPICAL at 21:10

## 2023-05-08 RX ADMIN — SUFENTANIL CITRATE 20 MCG: 50 INJECTION EPIDURAL; INTRAVENOUS at 13:02

## 2023-05-08 RX ADMIN — SODIUM CHLORIDE 4 MILLION UNITS: 9 INJECTION, SOLUTION INTRAVENOUS at 00:13

## 2023-05-08 RX ADMIN — SODIUM CHLORIDE 4 MILLION UNITS: 9 INJECTION, SOLUTION INTRAVENOUS at 09:17

## 2023-05-08 RX ADMIN — SODIUM CHLORIDE, PRESERVATIVE FREE 10 ML: 5 INJECTION INTRAVENOUS at 09:21

## 2023-05-08 RX ADMIN — CEFAZOLIN 2 G: 1 INJECTION, POWDER, FOR SOLUTION INTRAMUSCULAR; INTRAVENOUS at 12:15

## 2023-05-08 RX ADMIN — CEFAZOLIN 2000 MG: 2 INJECTION, POWDER, FOR SOLUTION INTRAMUSCULAR; INTRAVENOUS at 09:13

## 2023-05-08 RX ADMIN — CEFAZOLIN 2000 MG: 2 INJECTION, POWDER, FOR SOLUTION INTRAMUSCULAR; INTRAVENOUS at 00:10

## 2023-05-08 RX ADMIN — METHOCARBAMOL 1500 MG: 750 TABLET ORAL at 17:00

## 2023-05-08 RX ADMIN — SUFENTANIL CITRATE 30 MCG: 50 INJECTION EPIDURAL; INTRAVENOUS at 13:26

## 2023-05-08 RX ADMIN — MIDAZOLAM 2 MG: 1 INJECTION INTRAMUSCULAR; INTRAVENOUS at 11:58

## 2023-05-08 RX ADMIN — ACETAMINOPHEN 650 MG: 325 TABLET ORAL at 17:00

## 2023-05-08 RX ADMIN — IOPAMIDOL 120 ML: 755 INJECTION, SOLUTION INTRAVENOUS at 08:00

## 2023-05-08 RX ADMIN — BACITRACIN ZINC: 500 OINTMENT TOPICAL at 15:49

## 2023-05-08 RX ADMIN — ACETAMINOPHEN 650 MG: 325 TABLET ORAL at 09:12

## 2023-05-08 RX ADMIN — OXYCODONE HYDROCHLORIDE 10 MG: 10 TABLET ORAL at 06:32

## 2023-05-08 RX ADMIN — Medication 50 MG: at 12:50

## 2023-05-08 RX ADMIN — SUGAMMADEX 191 MG: 100 INJECTION, SOLUTION INTRAVENOUS at 14:57

## 2023-05-08 RX ADMIN — SODIUM PHOSPHATE, MONOBASIC, MONOHYDRATE AND SODIUM PHOSPHATE, DIBASIC, ANHYDROUS 20 MMOL: 276; 142 INJECTION, SOLUTION INTRAVENOUS at 17:44

## 2023-05-08 RX ADMIN — METHOCARBAMOL 1500 MG: 750 TABLET ORAL at 21:14

## 2023-05-08 RX ADMIN — DEXAMETHASONE SODIUM PHOSPHATE 10 MG: 10 INJECTION INTRAMUSCULAR; INTRAVENOUS at 12:04

## 2023-05-08 RX ADMIN — LABETALOL HYDROCHLORIDE 5 MG: 5 INJECTION INTRAVENOUS at 13:24

## 2023-05-08 RX ADMIN — DOCUSATE SODIUM AND SENNOSIDES 1 TABLET: 8.6; 5 TABLET, FILM COATED ORAL at 22:20

## 2023-05-08 RX ADMIN — GABAPENTIN 200 MG: 100 CAPSULE ORAL at 05:44

## 2023-05-08 ASSESSMENT — PAIN SCALES - GENERAL
PAINLEVEL_OUTOF10: 10
PAINLEVEL_OUTOF10: 9
PAINLEVEL_OUTOF10: 8
PAINLEVEL_OUTOF10: 4
PAINLEVEL_OUTOF10: 10
PAINLEVEL_OUTOF10: 9
PAINLEVEL_OUTOF10: 10
PAINLEVEL_OUTOF10: 8
PAINLEVEL_OUTOF10: 9
PAINLEVEL_OUTOF10: 10

## 2023-05-08 ASSESSMENT — PAIN DESCRIPTION - DESCRIPTORS
DESCRIPTORS: ACHING;DISCOMFORT;SORE;TENDER
DESCRIPTORS: ACHING;DISCOMFORT;SORE
DESCRIPTORS: ACHING;DISCOMFORT;SORE
DESCRIPTORS: ACHING;STABBING;THROBBING

## 2023-05-08 ASSESSMENT — PAIN DESCRIPTION - LOCATION
LOCATION: PELVIS
LOCATION: PELVIS
LOCATION: BACK;PELVIS;LEG
LOCATION: LEG;PELVIS

## 2023-05-08 ASSESSMENT — PAIN DESCRIPTION - ORIENTATION
ORIENTATION: RIGHT;MID
ORIENTATION: RIGHT;MID
ORIENTATION: RIGHT;LEFT;MID
ORIENTATION: LEFT;RIGHT;MID

## 2023-05-08 ASSESSMENT — PAIN - FUNCTIONAL ASSESSMENT
PAIN_FUNCTIONAL_ASSESSMENT: ACTIVITIES ARE NOT PREVENTED
PAIN_FUNCTIONAL_ASSESSMENT: PREVENTS OR INTERFERES WITH ALL ACTIVE AND SOME PASSIVE ACTIVITIES
PAIN_FUNCTIONAL_ASSESSMENT: PREVENTS OR INTERFERES SOME ACTIVE ACTIVITIES AND ADLS
PAIN_FUNCTIONAL_ASSESSMENT: PREVENTS OR INTERFERES WITH ALL ACTIVE AND SOME PASSIVE ACTIVITIES

## 2023-05-08 ASSESSMENT — PAIN DESCRIPTION - ONSET
ONSET: ON-GOING
ONSET: ON-GOING

## 2023-05-08 ASSESSMENT — PAIN DESCRIPTION - FREQUENCY
FREQUENCY: CONTINUOUS
FREQUENCY: CONTINUOUS

## 2023-05-08 ASSESSMENT — PAIN DESCRIPTION - PAIN TYPE
TYPE: ACUTE PAIN;SURGICAL PAIN
TYPE: ACUTE PAIN;SURGICAL PAIN

## 2023-05-08 ASSESSMENT — PAIN SCALES - WONG BAKER: WONGBAKER_NUMERICALRESPONSE: 0

## 2023-05-08 NOTE — ANESTHESIA PRE PROCEDURE
Department of Anesthesiology  Preprocedure Note       Name:  Dean Mariee   Age:  45 y.o.  :  1985                                          MRN:  [id number]         Date:  2023      Surgeon: Yoan Anderson):  MD Andres Key DO    Procedure: Procedure(s): FEMUR OPEN REDUCTION INTERNAL FIXATION - SYNTHES  PELVIS OPEN REDUCTION INTERNAL FIXATION ANTERIOR, WITH IRRIGATION AND DEBRIDEMENT WITH REMOVAL OF EX FIX PELVIS AND FEMUR - SYNTHES    Medications prior to admission:   Prior to Admission medications    Not on File       Current medications:    No current facility-administered medications for this visit. No current outpatient medications on file.      Facility-Administered Medications Ordered in Other Visits   Medication Dose Route Frequency Provider Last Rate Last Admin    sodium chloride flush 0.9 % injection 10 mL  10 mL IntraVENous Once PRN Che Juarez MD        lactated ringers IV soln infusion   IntraVENous Continuous Adelfo Salazar  mL/hr at 23 0704 New Bag at 23 0704    penicillin G potassium 4 Million Units in sodium chloride 0.9 % 100 mL IVPB  4 Million Units IntraVENous Q4H Adelfo Salazar  mL/hr at 23 0917 4 Million Units at 23 0917    0.9 % sodium chloride infusion   IntraVENous PRN Rita Lawler,         0.9 % sodium chloride infusion   IntraVENous PRN Rita Lawler DO        trimethobenzamide Loel Precise) injection 200 mg  200 mg IntraMUSCular Q6H PRN Pillo Reagin, DO   200 mg at 23 0211    sodium chloride flush 0.9 % injection 5-40 mL  5-40 mL IntraVENous 2 times per day Frederick Martel MD   10 mL at 23 0921    sodium chloride flush 0.9 % injection 5-40 mL  5-40 mL IntraVENous PRN Frdeerick Martel MD        0.9 % sodium chloride infusion   IntraVENous PRN Frederick Martel MD        labetalol (NORMODYNE;TRANDATE) injection 10 mg  10 mg IntraVENous Q15 Min PRN Maxwell Funes

## 2023-05-08 NOTE — ANESTHESIA POSTPROCEDURE EVALUATION
Department of Anesthesiology  Postprocedure Note    Patient: Hattie Gonzalez  MRN: [id number]  YOB: 1985  Date of evaluation: 5/8/2023      Procedure Summary     Date: 05/08/23 Room / Location: [facility] OR 08 / [facility]    Anesthesia Start: 0739 Anesthesia Stop: 8191    Procedure: PELVIS OPEN REDUCTION INTERNAL FIXATION ANTERIOR, WITH IRRIGATION AND DEBRIDEMENT,  REMOVAL OF EX FIX PELVIS AND FEMUR ,OPEN REDUCTION INTERNAL FIXATION FEMUR (Right: Leg Upper) Diagnosis:       Other type III open fracture of right femur, unspecified portion of femur, initial encounter ([facility]      Closed displaced fracture of pelvis, unspecified part of pelvis, initial encounter ([facility]      Type III open fracture of right femur, unspecified fracture morphology, unspecified portion of femur, initial encounter ([facility]      (TYPE III OPEN DISPLACED COMMINUTED FRACTURE SHAFT OF RIGHT FEMUR, CLOSED DISPLACED FRACTURE OF PELVIS, UNSPECIFIED)    Surgeons: Deandre Coates MD Responsible Provider: Manuela Garcia DO    Anesthesia Type: general ASA Status: 3          Anesthesia Type: No value filed.     Danielle Phase I: Danielle Score: 8    Danielle Phase II:        Anesthesia Post Evaluation    Patient location during evaluation: ICU  Patient participation: complete - patient participated  Level of consciousness: sedated and ventilated and awake and alert  Pain score: 2  Airway patency: patent  Nausea & Vomiting: no nausea and no vomiting  Complications: no  Cardiovascular status: blood pressure returned to baseline  Respiratory status: nasal cannula  Hydration status: euvolemic  Multimodal analgesia pain management approach

## 2023-05-09 ENCOUNTER — APPOINTMENT (OUTPATIENT)
Dept: GENERAL RADIOLOGY | Age: 38
End: 2023-05-09
Payer: COMMERCIAL

## 2023-05-09 LAB
ALBUMIN SERPL-MCNC: 2.5 G/DL (ref 3.5–5.2)
ALP SERPL-CCNC: 57 U/L (ref 40–129)
ALT SERPL-CCNC: 34 U/L (ref 0–40)
ANION GAP SERPL CALCULATED.3IONS-SCNC: 8 MMOL/L (ref 7–16)
AST SERPL-CCNC: 138 U/L (ref 0–39)
BASOPHILS # BLD: 0.02 E9/L (ref 0–0.2)
BASOPHILS NFR BLD: 0.1 % (ref 0–2)
BILIRUB SERPL-MCNC: 0.5 MG/DL (ref 0–1.2)
BUN SERPL-MCNC: 13 MG/DL (ref 6–20)
CA-I BLD-SCNC: 1.19 MMOL/L (ref 1.15–1.33)
CALCIUM SERPL-MCNC: 8.2 MG/DL (ref 8.6–10.2)
CHLORIDE SERPL-SCNC: 99 MMOL/L (ref 98–107)
CO2 SERPL-SCNC: 30 MMOL/L (ref 22–29)
CREAT SERPL-MCNC: 0.7 MG/DL (ref 0.7–1.2)
EOSINOPHIL # BLD: 0 E9/L (ref 0.05–0.5)
EOSINOPHIL NFR BLD: 0 % (ref 0–6)
ERYTHROCYTE [DISTWIDTH] IN BLOOD BY AUTOMATED COUNT: 13.7 FL (ref 11.5–15)
ERYTHROCYTE [DISTWIDTH] IN BLOOD BY AUTOMATED COUNT: 13.8 FL (ref 11.5–15)
GLUCOSE SERPL-MCNC: 161 MG/DL (ref 74–99)
HCT VFR BLD AUTO: 21.7 % (ref 37–54)
HCT VFR BLD AUTO: 22.4 % (ref 37–54)
HGB BLD-MCNC: 7.3 G/DL (ref 12.5–16.5)
HGB BLD-MCNC: 7.4 G/DL (ref 12.5–16.5)
IMM GRANULOCYTES # BLD: 0.16 E9/L
IMM GRANULOCYTES NFR BLD: 1 % (ref 0–5)
LYMPHOCYTES # BLD: 0.6 E9/L (ref 1.5–4)
LYMPHOCYTES NFR BLD: 3.9 % (ref 20–42)
MAGNESIUM SERPL-MCNC: 2.3 MG/DL (ref 1.6–2.6)
MCH RBC QN AUTO: 29.6 PG (ref 26–35)
MCH RBC QN AUTO: 30.1 PG (ref 26–35)
MCHC RBC AUTO-ENTMCNC: 32.6 % (ref 32–34.5)
MCHC RBC AUTO-ENTMCNC: 34.1 % (ref 32–34.5)
MCV RBC AUTO: 88.2 FL (ref 80–99.9)
MCV RBC AUTO: 90.7 FL (ref 80–99.9)
MONOCYTES # BLD: 0.63 E9/L (ref 0.1–0.95)
MONOCYTES NFR BLD: 4.1 % (ref 2–12)
NEUTROPHILS # BLD: 14.01 E9/L (ref 1.8–7.3)
NEUTS SEG NFR BLD: 90.9 % (ref 43–80)
OVALOCYTES: ABNORMAL
PHOSPHATE SERPL-MCNC: 2.9 MG/DL (ref 2.5–4.5)
PLATELET # BLD AUTO: 123 E9/L (ref 130–450)
PLATELET # BLD AUTO: 131 E9/L (ref 130–450)
PMV BLD AUTO: 10.7 FL (ref 7–12)
PMV BLD AUTO: 11.4 FL (ref 7–12)
POIKILOCYTES: ABNORMAL
POLYCHROMASIA: ABNORMAL
POTASSIUM SERPL-SCNC: 4.3 MMOL/L (ref 3.5–5)
PROT SERPL-MCNC: 5.4 G/DL (ref 6.4–8.3)
RBC # BLD AUTO: 2.46 E12/L (ref 3.8–5.8)
RBC # BLD AUTO: 2.47 E12/L (ref 3.8–5.8)
SODIUM SERPL-SCNC: 137 MMOL/L (ref 132–146)
WBC # BLD: 15.1 E9/L (ref 4.5–11.5)
WBC # BLD: 15.4 E9/L (ref 4.5–11.5)

## 2023-05-09 PROCEDURE — 2500000003 HC RX 250 WO HCPCS

## 2023-05-09 PROCEDURE — 2580000003 HC RX 258: Performed by: STUDENT IN AN ORGANIZED HEALTH CARE EDUCATION/TRAINING PROGRAM

## 2023-05-09 PROCEDURE — 6370000000 HC RX 637 (ALT 250 FOR IP)

## 2023-05-09 PROCEDURE — 85025 COMPLETE CBC W/AUTO DIFF WBC: CPT

## 2023-05-09 PROCEDURE — 6360000002 HC RX W HCPCS

## 2023-05-09 PROCEDURE — 85027 COMPLETE CBC AUTOMATED: CPT

## 2023-05-09 PROCEDURE — 6360000002 HC RX W HCPCS: Performed by: STUDENT IN AN ORGANIZED HEALTH CARE EDUCATION/TRAINING PROGRAM

## 2023-05-09 PROCEDURE — 80053 COMPREHEN METABOLIC PANEL: CPT

## 2023-05-09 PROCEDURE — 6360000002 HC RX W HCPCS: Performed by: SURGERY

## 2023-05-09 PROCEDURE — 74018 RADEX ABDOMEN 1 VIEW: CPT

## 2023-05-09 PROCEDURE — 82330 ASSAY OF CALCIUM: CPT

## 2023-05-09 PROCEDURE — 83735 ASSAY OF MAGNESIUM: CPT

## 2023-05-09 PROCEDURE — 84100 ASSAY OF PHOSPHORUS: CPT

## 2023-05-09 PROCEDURE — 99291 CRITICAL CARE FIRST HOUR: CPT | Performed by: SURGERY

## 2023-05-09 PROCEDURE — 2580000003 HC RX 258: Performed by: SURGERY

## 2023-05-09 PROCEDURE — 6370000000 HC RX 637 (ALT 250 FOR IP): Performed by: STUDENT IN AN ORGANIZED HEALTH CARE EDUCATION/TRAINING PROGRAM

## 2023-05-09 PROCEDURE — 2700000000 HC OXYGEN THERAPY PER DAY

## 2023-05-09 PROCEDURE — 2580000003 HC RX 258

## 2023-05-09 PROCEDURE — 36415 COLL VENOUS BLD VENIPUNCTURE: CPT

## 2023-05-09 PROCEDURE — 37799 UNLISTED PX VASCULAR SURGERY: CPT

## 2023-05-09 PROCEDURE — 2000000000 HC ICU R&B

## 2023-05-09 RX ORDER — KETOROLAC TROMETHAMINE 30 MG/ML
15 INJECTION, SOLUTION INTRAMUSCULAR; INTRAVENOUS EVERY 6 HOURS PRN
Status: DISCONTINUED | OUTPATIENT
Start: 2023-05-09 | End: 2023-05-10

## 2023-05-09 RX ADMIN — OXYCODONE HYDROCHLORIDE 10 MG: 10 TABLET ORAL at 00:05

## 2023-05-09 RX ADMIN — HYDROMORPHONE HYDROCHLORIDE 0.5 MG: 1 INJECTION, SOLUTION INTRAMUSCULAR; INTRAVENOUS; SUBCUTANEOUS at 08:13

## 2023-05-09 RX ADMIN — DOCUSATE SODIUM AND SENNOSIDES 1 TABLET: 8.6; 5 TABLET, FILM COATED ORAL at 08:13

## 2023-05-09 RX ADMIN — OXYCODONE HYDROCHLORIDE 10 MG: 10 TABLET ORAL at 11:20

## 2023-05-09 RX ADMIN — METHOCARBAMOL 1500 MG: 750 TABLET ORAL at 08:12

## 2023-05-09 RX ADMIN — HYDROMORPHONE HYDROCHLORIDE 0.5 MG: 1 INJECTION, SOLUTION INTRAMUSCULAR; INTRAVENOUS; SUBCUTANEOUS at 17:12

## 2023-05-09 RX ADMIN — GABAPENTIN 300 MG: 300 CAPSULE ORAL at 21:36

## 2023-05-09 RX ADMIN — Medication 10 MG: at 08:16

## 2023-05-09 RX ADMIN — SODIUM CHLORIDE 4 MILLION UNITS: 9 INJECTION, SOLUTION INTRAVENOUS at 23:44

## 2023-05-09 RX ADMIN — SODIUM CHLORIDE 4 MILLION UNITS: 9 INJECTION, SOLUTION INTRAVENOUS at 03:48

## 2023-05-09 RX ADMIN — ACETAMINOPHEN 650 MG: 325 TABLET ORAL at 14:08

## 2023-05-09 RX ADMIN — HYDROMORPHONE HYDROCHLORIDE 0.5 MG: 1 INJECTION, SOLUTION INTRAMUSCULAR; INTRAVENOUS; SUBCUTANEOUS at 20:17

## 2023-05-09 RX ADMIN — POLYMYXIN B SULFATE, BACITRACIN ZINC, NEOMYCIN SULFATE: 5000; 3.5; 4 OINTMENT TOPICAL at 08:21

## 2023-05-09 RX ADMIN — DEXTROSE AND SODIUM CHLORIDE: 5; 450 INJECTION, SOLUTION INTRAVENOUS at 15:59

## 2023-05-09 RX ADMIN — DEXTROSE AND SODIUM CHLORIDE: 5; 450 INJECTION, SOLUTION INTRAVENOUS at 03:47

## 2023-05-09 RX ADMIN — BACITRACIN ZINC: 500 OINTMENT TOPICAL at 08:21

## 2023-05-09 RX ADMIN — HYDROMORPHONE HYDROCHLORIDE 0.5 MG: 1 INJECTION, SOLUTION INTRAMUSCULAR; INTRAVENOUS; SUBCUTANEOUS at 23:04

## 2023-05-09 RX ADMIN — SODIUM CHLORIDE, PRESERVATIVE FREE 10 ML: 5 INJECTION INTRAVENOUS at 08:46

## 2023-05-09 RX ADMIN — CEFAZOLIN 2000 MG: 2 INJECTION, POWDER, FOR SOLUTION INTRAMUSCULAR; INTRAVENOUS at 00:13

## 2023-05-09 RX ADMIN — METHOCARBAMOL 1000 MG: 100 INJECTION, SOLUTION INTRAMUSCULAR; INTRAVENOUS at 21:00

## 2023-05-09 RX ADMIN — GABAPENTIN 300 MG: 300 CAPSULE ORAL at 05:39

## 2023-05-09 RX ADMIN — HYDROMORPHONE HYDROCHLORIDE 0.5 MG: 1 INJECTION, SOLUTION INTRAMUSCULAR; INTRAVENOUS; SUBCUTANEOUS at 12:42

## 2023-05-09 RX ADMIN — SODIUM CHLORIDE 4 MILLION UNITS: 9 INJECTION, SOLUTION INTRAVENOUS at 17:02

## 2023-05-09 RX ADMIN — CEFAZOLIN 2000 MG: 2 INJECTION, POWDER, FOR SOLUTION INTRAMUSCULAR; INTRAVENOUS at 17:05

## 2023-05-09 RX ADMIN — SODIUM PHOSPHATE, MONOBASIC, MONOHYDRATE AND SODIUM PHOSPHATE, DIBASIC, ANHYDROUS 10 MMOL: 142; 276 INJECTION, SOLUTION INTRAVENOUS at 08:42

## 2023-05-09 RX ADMIN — ACETAMINOPHEN 650 MG: 325 TABLET ORAL at 08:12

## 2023-05-09 RX ADMIN — CEFAZOLIN 2000 MG: 2 INJECTION, POWDER, FOR SOLUTION INTRAMUSCULAR; INTRAVENOUS at 08:21

## 2023-05-09 RX ADMIN — POLYMYXIN B SULFATE, BACITRACIN ZINC, NEOMYCIN SULFATE: 5000; 3.5; 4 OINTMENT TOPICAL at 21:38

## 2023-05-09 RX ADMIN — ONDANSETRON 4 MG: 2 INJECTION INTRAMUSCULAR; INTRAVENOUS at 16:08

## 2023-05-09 RX ADMIN — SODIUM CHLORIDE 4 MILLION UNITS: 9 INJECTION, SOLUTION INTRAVENOUS at 08:38

## 2023-05-09 RX ADMIN — KETOROLAC TROMETHAMINE 15 MG: 30 INJECTION, SOLUTION INTRAMUSCULAR; INTRAVENOUS at 21:43

## 2023-05-09 RX ADMIN — SODIUM CHLORIDE 4 MILLION UNITS: 9 INJECTION, SOLUTION INTRAVENOUS at 00:11

## 2023-05-09 RX ADMIN — ENOXAPARIN SODIUM 30 MG: 100 INJECTION SUBCUTANEOUS at 20:39

## 2023-05-09 RX ADMIN — ENOXAPARIN SODIUM 30 MG: 100 INJECTION SUBCUTANEOUS at 08:12

## 2023-05-09 RX ADMIN — SODIUM CHLORIDE, PRESERVATIVE FREE 10 ML: 5 INJECTION INTRAVENOUS at 21:34

## 2023-05-09 RX ADMIN — OXYCODONE 5 MG: 5 TABLET ORAL at 05:38

## 2023-05-09 RX ADMIN — SODIUM CHLORIDE, PRESERVATIVE FREE 10 ML: 5 INJECTION INTRAVENOUS at 20:39

## 2023-05-09 RX ADMIN — SODIUM CHLORIDE 4 MILLION UNITS: 9 INJECTION, SOLUTION INTRAVENOUS at 20:39

## 2023-05-09 RX ADMIN — ACETAMINOPHEN 650 MG: 325 TABLET ORAL at 03:15

## 2023-05-09 RX ADMIN — KETOROLAC TROMETHAMINE 15 MG: 30 INJECTION, SOLUTION INTRAMUSCULAR; INTRAVENOUS at 15:45

## 2023-05-09 RX ADMIN — HYDROMORPHONE HYDROCHLORIDE 0.5 MG: 1 INJECTION, SOLUTION INTRAMUSCULAR; INTRAVENOUS; SUBCUTANEOUS at 03:18

## 2023-05-09 RX ADMIN — BACITRACIN ZINC: 500 OINTMENT TOPICAL at 14:17

## 2023-05-09 RX ADMIN — BACITRACIN ZINC: 500 OINTMENT TOPICAL at 21:39

## 2023-05-09 RX ADMIN — METHOCARBAMOL 1000 MG: 100 INJECTION, SOLUTION INTRAMUSCULAR; INTRAVENOUS at 14:07

## 2023-05-09 RX ADMIN — POLYETHYLENE GLYCOL 3350 17 G: 17 POWDER, FOR SOLUTION ORAL at 08:12

## 2023-05-09 RX ADMIN — GENTAMICIN SULFATE 480 MG: 40 INJECTION, SOLUTION INTRAMUSCULAR; INTRAVENOUS at 18:07

## 2023-05-09 RX ADMIN — GABAPENTIN 300 MG: 300 CAPSULE ORAL at 14:08

## 2023-05-09 RX ADMIN — SODIUM CHLORIDE 4 MILLION UNITS: 9 INJECTION, SOLUTION INTRAVENOUS at 12:38

## 2023-05-09 RX ADMIN — NALOXEGOL OXALATE 12.5 MG: 12.5 TABLET, FILM COATED ORAL at 08:29

## 2023-05-09 RX ADMIN — ACETAMINOPHEN 650 MG: 325 TABLET ORAL at 20:39

## 2023-05-09 ASSESSMENT — PAIN DESCRIPTION - ORIENTATION
ORIENTATION: RIGHT;LEFT;MID
ORIENTATION: RIGHT;LEFT;LOWER;MID
ORIENTATION: RIGHT;LEFT;MID

## 2023-05-09 ASSESSMENT — PAIN DESCRIPTION - LOCATION
LOCATION: PELVIS;BACK
LOCATION: ABDOMEN;BACK;PELVIS
LOCATION: PELVIS
LOCATION: ABDOMEN;BACK;PELVIS
LOCATION: ABDOMEN;BACK;PELVIS
LOCATION: PELVIS

## 2023-05-09 ASSESSMENT — PAIN DESCRIPTION - FREQUENCY
FREQUENCY: CONTINUOUS

## 2023-05-09 ASSESSMENT — PAIN DESCRIPTION - DESCRIPTORS
DESCRIPTORS: ACHING;SORE;TENDER
DESCRIPTORS: ACHING;DISCOMFORT;SORE;SPASM
DESCRIPTORS: ACHING;SPASM;DISCOMFORT;SORE
DESCRIPTORS: ACHING;SORE;DISCOMFORT
DESCRIPTORS: ACHING;DISCOMFORT;SORE;STABBING;SHARP
DESCRIPTORS: ACHING;SPASM;SORE;DISCOMFORT

## 2023-05-09 ASSESSMENT — PAIN DESCRIPTION - PAIN TYPE
TYPE: ACUTE PAIN;SURGICAL PAIN

## 2023-05-09 ASSESSMENT — PAIN DESCRIPTION - ONSET
ONSET: ON-GOING

## 2023-05-09 ASSESSMENT — PAIN SCALES - GENERAL
PAINLEVEL_OUTOF10: 8
PAINLEVEL_OUTOF10: 10
PAINLEVEL_OUTOF10: 8
PAINLEVEL_OUTOF10: 9
PAINLEVEL_OUTOF10: 10
PAINLEVEL_OUTOF10: 8
PAINLEVEL_OUTOF10: 2
PAINLEVEL_OUTOF10: 8
PAINLEVEL_OUTOF10: 8
PAINLEVEL_OUTOF10: 10

## 2023-05-09 ASSESSMENT — PAIN - FUNCTIONAL ASSESSMENT
PAIN_FUNCTIONAL_ASSESSMENT: PREVENTS OR INTERFERES WITH ALL ACTIVE AND SOME PASSIVE ACTIVITIES
PAIN_FUNCTIONAL_ASSESSMENT: PREVENTS OR INTERFERES SOME ACTIVE ACTIVITIES AND ADLS
PAIN_FUNCTIONAL_ASSESSMENT: PREVENTS OR INTERFERES WITH ALL ACTIVE AND SOME PASSIVE ACTIVITIES

## 2023-05-10 ENCOUNTER — APPOINTMENT (OUTPATIENT)
Dept: GENERAL RADIOLOGY | Age: 38
End: 2023-05-10
Payer: COMMERCIAL

## 2023-05-10 PROBLEM — S37.22XA: Status: ACTIVE | Noted: 2023-05-10

## 2023-05-10 LAB
ALBUMIN SERPL-MCNC: 2.4 G/DL (ref 3.5–5.2)
ALP SERPL-CCNC: 58 U/L (ref 40–129)
ALT SERPL-CCNC: 27 U/L (ref 0–40)
ANION GAP SERPL CALCULATED.3IONS-SCNC: 6 MMOL/L (ref 7–16)
ANISOCYTOSIS: ABNORMAL
AST SERPL-CCNC: 85 U/L (ref 0–39)
BACTERIA URNS QL MICRO: ABNORMAL /HPF
BASOPHILS # BLD: 0.01 E9/L (ref 0–0.2)
BASOPHILS NFR BLD: 0.1 % (ref 0–2)
BILIRUB SERPL-MCNC: 0.5 MG/DL (ref 0–1.2)
BILIRUB UR QL STRIP: NEGATIVE
BUN SERPL-MCNC: 17 MG/DL (ref 6–20)
CA-I BLD-SCNC: 1.18 MMOL/L (ref 1.15–1.33)
CALCIUM SERPL-MCNC: 7.9 MG/DL (ref 8.6–10.2)
CHLORIDE SERPL-SCNC: 98 MMOL/L (ref 98–107)
CLARITY UR: CLEAR
CO2 SERPL-SCNC: 29 MMOL/L (ref 22–29)
COLOR UR: YELLOW
CREAT SERPL-MCNC: 0.8 MG/DL (ref 0.7–1.2)
EOSINOPHIL # BLD: 0.04 E9/L (ref 0.05–0.5)
EOSINOPHIL NFR BLD: 0.4 % (ref 0–6)
EPI CELLS #/AREA URNS HPF: ABNORMAL /HPF
ERYTHROCYTE [DISTWIDTH] IN BLOOD BY AUTOMATED COUNT: 13.7 FL (ref 11.5–15)
GLUCOSE SERPL-MCNC: 132 MG/DL (ref 74–99)
GLUCOSE UR STRIP-MCNC: NEGATIVE MG/DL
HCT VFR BLD AUTO: 20.9 % (ref 37–54)
HGB BLD-MCNC: 6.8 G/DL (ref 12.5–16.5)
HGB UR QL STRIP: ABNORMAL
HYPOCHROMIA: ABNORMAL
IMM GRANULOCYTES # BLD: 0.39 E9/L
IMM GRANULOCYTES NFR BLD: 3.5 % (ref 0–5)
KETONES UR STRIP-MCNC: NEGATIVE MG/DL
LEUKOCYTE ESTERASE UR QL STRIP: NEGATIVE
LYMPHOCYTES # BLD: 0.98 E9/L (ref 1.5–4)
LYMPHOCYTES NFR BLD: 8.8 % (ref 20–42)
MAGNESIUM SERPL-MCNC: 2.1 MG/DL (ref 1.6–2.6)
MCH RBC QN AUTO: 29.4 PG (ref 26–35)
MCHC RBC AUTO-ENTMCNC: 32.5 % (ref 32–34.5)
MCV RBC AUTO: 90.5 FL (ref 80–99.9)
MONOCYTES # BLD: 0.58 E9/L (ref 0.1–0.95)
MONOCYTES NFR BLD: 5.2 % (ref 2–12)
NEUTROPHILS # BLD: 9.1 E9/L (ref 1.8–7.3)
NEUTS SEG NFR BLD: 82 % (ref 43–80)
NITRITE UR QL STRIP: NEGATIVE
OVALOCYTES: ABNORMAL
PH UR STRIP: 6 [PH] (ref 5–9)
PHOSPHATE SERPL-MCNC: 2.5 MG/DL (ref 2.5–4.5)
PLATELET # BLD AUTO: 152 E9/L (ref 130–450)
PMV BLD AUTO: 10.3 FL (ref 7–12)
POIKILOCYTES: ABNORMAL
POLYCHROMASIA: ABNORMAL
POTASSIUM SERPL-SCNC: 3.7 MMOL/L (ref 3.5–5)
PROT SERPL-MCNC: 5 G/DL (ref 6.4–8.3)
PROT UR STRIP-MCNC: NEGATIVE MG/DL
RBC # BLD AUTO: 2.31 E12/L (ref 3.8–5.8)
RBC #/AREA URNS HPF: ABNORMAL /HPF (ref 0–2)
SODIUM SERPL-SCNC: 133 MMOL/L (ref 132–146)
SP GR UR STRIP: 1.01 (ref 1–1.03)
UROBILINOGEN UR STRIP-ACNC: 0.2 E.U./DL
WBC # BLD: 11.1 E9/L (ref 4.5–11.5)
WBC #/AREA URNS HPF: ABNORMAL /HPF (ref 0–5)

## 2023-05-10 PROCEDURE — 6360000002 HC RX W HCPCS

## 2023-05-10 PROCEDURE — 36415 COLL VENOUS BLD VENIPUNCTURE: CPT

## 2023-05-10 PROCEDURE — 6370000000 HC RX 637 (ALT 250 FOR IP)

## 2023-05-10 PROCEDURE — 81001 URINALYSIS AUTO W/SCOPE: CPT

## 2023-05-10 PROCEDURE — 6370000000 HC RX 637 (ALT 250 FOR IP): Performed by: STUDENT IN AN ORGANIZED HEALTH CARE EDUCATION/TRAINING PROGRAM

## 2023-05-10 PROCEDURE — 6360000002 HC RX W HCPCS: Performed by: STUDENT IN AN ORGANIZED HEALTH CARE EDUCATION/TRAINING PROGRAM

## 2023-05-10 PROCEDURE — 97162 PT EVAL MOD COMPLEX 30 MIN: CPT

## 2023-05-10 PROCEDURE — P9016 RBC LEUKOCYTES REDUCED: HCPCS

## 2023-05-10 PROCEDURE — 2500000003 HC RX 250 WO HCPCS: Performed by: STUDENT IN AN ORGANIZED HEALTH CARE EDUCATION/TRAINING PROGRAM

## 2023-05-10 PROCEDURE — 2580000003 HC RX 258: Performed by: STUDENT IN AN ORGANIZED HEALTH CARE EDUCATION/TRAINING PROGRAM

## 2023-05-10 PROCEDURE — 2580000003 HC RX 258

## 2023-05-10 PROCEDURE — 2500000003 HC RX 250 WO HCPCS

## 2023-05-10 PROCEDURE — 80053 COMPREHEN METABOLIC PANEL: CPT

## 2023-05-10 PROCEDURE — 2000000000 HC ICU R&B

## 2023-05-10 PROCEDURE — 97166 OT EVAL MOD COMPLEX 45 MIN: CPT

## 2023-05-10 PROCEDURE — 6360000002 HC RX W HCPCS: Performed by: SURGERY

## 2023-05-10 PROCEDURE — 97530 THERAPEUTIC ACTIVITIES: CPT

## 2023-05-10 PROCEDURE — 85025 COMPLETE CBC W/AUTO DIFF WBC: CPT

## 2023-05-10 PROCEDURE — 2700000000 HC OXYGEN THERAPY PER DAY

## 2023-05-10 PROCEDURE — 36430 TRANSFUSION BLD/BLD COMPNT: CPT

## 2023-05-10 PROCEDURE — 74018 RADEX ABDOMEN 1 VIEW: CPT

## 2023-05-10 PROCEDURE — 84100 ASSAY OF PHOSPHORUS: CPT

## 2023-05-10 PROCEDURE — 83735 ASSAY OF MAGNESIUM: CPT

## 2023-05-10 PROCEDURE — 82330 ASSAY OF CALCIUM: CPT

## 2023-05-10 PROCEDURE — 37799 UNLISTED PX VASCULAR SURGERY: CPT

## 2023-05-10 PROCEDURE — 99291 CRITICAL CARE FIRST HOUR: CPT | Performed by: SURGERY

## 2023-05-10 PROCEDURE — 2580000003 HC RX 258: Performed by: SURGERY

## 2023-05-10 RX ORDER — FUROSEMIDE 10 MG/ML
20 INJECTION INTRAMUSCULAR; INTRAVENOUS ONCE
Status: COMPLETED | OUTPATIENT
Start: 2023-05-10 | End: 2023-05-10

## 2023-05-10 RX ORDER — SODIUM CHLORIDE 9 MG/ML
INJECTION, SOLUTION INTRAVENOUS PRN
Status: DISCONTINUED | OUTPATIENT
Start: 2023-05-10 | End: 2023-05-14

## 2023-05-10 RX ADMIN — HYDROMORPHONE HYDROCHLORIDE 0.5 MG: 1 INJECTION, SOLUTION INTRAMUSCULAR; INTRAVENOUS; SUBCUTANEOUS at 09:03

## 2023-05-10 RX ADMIN — METHOCARBAMOL 1000 MG: 100 INJECTION, SOLUTION INTRAMUSCULAR; INTRAVENOUS at 19:56

## 2023-05-10 RX ADMIN — OXYCODONE HYDROCHLORIDE 10 MG: 10 TABLET ORAL at 10:19

## 2023-05-10 RX ADMIN — ACETAMINOPHEN 650 MG: 325 TABLET ORAL at 15:16

## 2023-05-10 RX ADMIN — OXYCODONE HYDROCHLORIDE 10 MG: 10 TABLET ORAL at 14:18

## 2023-05-10 RX ADMIN — HYDROMORPHONE HYDROCHLORIDE 0.5 MG: 1 INJECTION, SOLUTION INTRAMUSCULAR; INTRAVENOUS; SUBCUTANEOUS at 02:02

## 2023-05-10 RX ADMIN — GABAPENTIN 300 MG: 300 CAPSULE ORAL at 15:17

## 2023-05-10 RX ADMIN — POLYMYXIN B SULFATE, BACITRACIN ZINC, NEOMYCIN SULFATE: 5000; 3.5; 4 OINTMENT TOPICAL at 09:08

## 2023-05-10 RX ADMIN — ACETAMINOPHEN 650 MG: 325 TABLET ORAL at 02:03

## 2023-05-10 RX ADMIN — FUROSEMIDE 20 MG: 10 INJECTION, SOLUTION INTRAMUSCULAR; INTRAVENOUS at 16:55

## 2023-05-10 RX ADMIN — NALOXEGOL OXALATE 12.5 MG: 12.5 TABLET, FILM COATED ORAL at 05:42

## 2023-05-10 RX ADMIN — HYDROMORPHONE HYDROCHLORIDE 0.5 MG: 1 INJECTION, SOLUTION INTRAMUSCULAR; INTRAVENOUS; SUBCUTANEOUS at 15:14

## 2023-05-10 RX ADMIN — HYDROMORPHONE HYDROCHLORIDE 0.5 MG: 1 INJECTION, SOLUTION INTRAMUSCULAR; INTRAVENOUS; SUBCUTANEOUS at 18:19

## 2023-05-10 RX ADMIN — SODIUM CHLORIDE, PRESERVATIVE FREE 10 ML: 5 INJECTION INTRAVENOUS at 09:07

## 2023-05-10 RX ADMIN — METHOCARBAMOL 1000 MG: 100 INJECTION, SOLUTION INTRAMUSCULAR; INTRAVENOUS at 04:14

## 2023-05-10 RX ADMIN — GABAPENTIN 300 MG: 300 CAPSULE ORAL at 05:37

## 2023-05-10 RX ADMIN — ENOXAPARIN SODIUM 30 MG: 100 INJECTION SUBCUTANEOUS at 09:07

## 2023-05-10 RX ADMIN — POLYMYXIN B SULFATE, BACITRACIN ZINC, NEOMYCIN SULFATE: 5000; 3.5; 4 OINTMENT TOPICAL at 19:49

## 2023-05-10 RX ADMIN — Medication 10 MG: at 09:07

## 2023-05-10 RX ADMIN — HYDROMORPHONE HYDROCHLORIDE 0.5 MG: 1 INJECTION, SOLUTION INTRAMUSCULAR; INTRAVENOUS; SUBCUTANEOUS at 21:21

## 2023-05-10 RX ADMIN — DEXTROSE AND SODIUM CHLORIDE: 5; 450 INJECTION, SOLUTION INTRAVENOUS at 03:24

## 2023-05-10 RX ADMIN — BACITRACIN ZINC: 500 OINTMENT TOPICAL at 19:48

## 2023-05-10 RX ADMIN — HYDROMORPHONE HYDROCHLORIDE 0.5 MG: 1 INJECTION, SOLUTION INTRAMUSCULAR; INTRAVENOUS; SUBCUTANEOUS at 11:55

## 2023-05-10 RX ADMIN — POTASSIUM PHOSPHATE, MONOBASIC AND POTASSIUM PHOSPHATE, DIBASIC 20 MMOL: 224; 236 INJECTION, SOLUTION, CONCENTRATE INTRAVENOUS at 09:52

## 2023-05-10 RX ADMIN — HYDROMORPHONE HYDROCHLORIDE 0.5 MG: 1 INJECTION, SOLUTION INTRAMUSCULAR; INTRAVENOUS; SUBCUTANEOUS at 05:37

## 2023-05-10 RX ADMIN — NALOXEGOL OXALATE 12.5 MG: 12.5 TABLET, FILM COATED ORAL at 11:56

## 2023-05-10 RX ADMIN — ACETAMINOPHEN 650 MG: 325 TABLET ORAL at 09:06

## 2023-05-10 RX ADMIN — ENOXAPARIN SODIUM 30 MG: 100 INJECTION SUBCUTANEOUS at 19:48

## 2023-05-10 RX ADMIN — KETOROLAC TROMETHAMINE 15 MG: 30 INJECTION, SOLUTION INTRAMUSCULAR; INTRAVENOUS at 03:41

## 2023-05-10 RX ADMIN — ACETAMINOPHEN 650 MG: 325 TABLET ORAL at 19:48

## 2023-05-10 RX ADMIN — BACITRACIN ZINC: 500 OINTMENT TOPICAL at 09:08

## 2023-05-10 RX ADMIN — GABAPENTIN 300 MG: 300 CAPSULE ORAL at 21:21

## 2023-05-10 RX ADMIN — FUROSEMIDE 20 MG: 10 INJECTION, SOLUTION INTRAMUSCULAR; INTRAVENOUS at 09:06

## 2023-05-10 RX ADMIN — METHOCARBAMOL 1000 MG: 100 INJECTION, SOLUTION INTRAMUSCULAR; INTRAVENOUS at 11:58

## 2023-05-10 RX ADMIN — OXYCODONE HYDROCHLORIDE 10 MG: 10 TABLET ORAL at 19:48

## 2023-05-10 RX ADMIN — BACITRACIN ZINC: 500 OINTMENT TOPICAL at 15:17

## 2023-05-10 RX ADMIN — POLYETHYLENE GLYCOL 3350 17 G: 17 POWDER, FOR SOLUTION ORAL at 09:07

## 2023-05-10 RX ADMIN — SODIUM CHLORIDE 4 MILLION UNITS: 9 INJECTION, SOLUTION INTRAVENOUS at 04:12

## 2023-05-10 ASSESSMENT — PAIN DESCRIPTION - FREQUENCY: FREQUENCY: CONTINUOUS

## 2023-05-10 ASSESSMENT — PAIN DESCRIPTION - ORIENTATION
ORIENTATION: MID
ORIENTATION: RIGHT;LEFT
ORIENTATION: RIGHT;LEFT
ORIENTATION: RIGHT
ORIENTATION: MID
ORIENTATION: MID
ORIENTATION: RIGHT;LEFT

## 2023-05-10 ASSESSMENT — PAIN SCALES - GENERAL
PAINLEVEL_OUTOF10: 7
PAINLEVEL_OUTOF10: 10
PAINLEVEL_OUTOF10: 9
PAINLEVEL_OUTOF10: 10
PAINLEVEL_OUTOF10: 10
PAINLEVEL_OUTOF10: 8
PAINLEVEL_OUTOF10: 8
PAINLEVEL_OUTOF10: 10
PAINLEVEL_OUTOF10: 9
PAINLEVEL_OUTOF10: 9
PAINLEVEL_OUTOF10: 10

## 2023-05-10 ASSESSMENT — PAIN DESCRIPTION - DESCRIPTORS
DESCRIPTORS: CRUSHING;DISCOMFORT;PRESSURE
DESCRIPTORS: ACHING;DISCOMFORT;SORE
DESCRIPTORS: ACHING;SHARP;SORE;THROBBING
DESCRIPTORS: ACHING;DISCOMFORT;SORE;TENDER
DESCRIPTORS: ACHING;SORE;SHOOTING
DESCRIPTORS: ACHING;DISCOMFORT;SORE

## 2023-05-10 ASSESSMENT — PAIN DESCRIPTION - LOCATION
LOCATION: BACK;PELVIS
LOCATION: BACK;LEG;PELVIS
LOCATION: PELVIS
LOCATION: ABDOMEN;PELVIS;BACK
LOCATION: ABDOMEN;PELVIS
LOCATION: HIP;LEG
LOCATION: PELVIS
LOCATION: ABDOMEN;PELVIS
LOCATION: LEG;GROIN

## 2023-05-10 ASSESSMENT — PAIN DESCRIPTION - ONSET: ONSET: ON-GOING

## 2023-05-10 ASSESSMENT — PAIN - FUNCTIONAL ASSESSMENT: PAIN_FUNCTIONAL_ASSESSMENT: PREVENTS OR INTERFERES SOME ACTIVE ACTIVITIES AND ADLS

## 2023-05-10 ASSESSMENT — PAIN DESCRIPTION - PAIN TYPE: TYPE: ACUTE PAIN;SURGICAL PAIN

## 2023-05-11 LAB
ALBUMIN SERPL-MCNC: 2.7 G/DL (ref 3.5–5.2)
ALP SERPL-CCNC: 74 U/L (ref 40–129)
ALT SERPL-CCNC: 35 U/L (ref 0–40)
ANION GAP SERPL CALCULATED.3IONS-SCNC: 11 MMOL/L (ref 7–16)
AST SERPL-CCNC: 75 U/L (ref 0–39)
BASOPHILS # BLD: 0 E9/L (ref 0–0.2)
BASOPHILS NFR BLD: 0.1 % (ref 0–2)
BILIRUB SERPL-MCNC: 0.8 MG/DL (ref 0–1.2)
BUN SERPL-MCNC: 18 MG/DL (ref 6–20)
CA-I BLD-SCNC: 1.15 MMOL/L (ref 1.15–1.33)
CALCIUM SERPL-MCNC: 7.9 MG/DL (ref 8.6–10.2)
CHLORIDE SERPL-SCNC: 96 MMOL/L (ref 98–107)
CO2 SERPL-SCNC: 27 MMOL/L (ref 22–29)
CREAT SERPL-MCNC: 0.7 MG/DL (ref 0.7–1.2)
EOSINOPHIL # BLD: 0.15 E9/L (ref 0.05–0.5)
EOSINOPHIL NFR BLD: 1.7 % (ref 0–6)
ERYTHROCYTE [DISTWIDTH] IN BLOOD BY AUTOMATED COUNT: 13.2 FL (ref 11.5–15)
GLUCOSE SERPL-MCNC: 91 MG/DL (ref 74–99)
HCT VFR BLD AUTO: 25.5 % (ref 37–54)
HGB BLD-MCNC: 8.2 G/DL (ref 12.5–16.5)
LYMPHOCYTES # BLD: 0.79 E9/L (ref 1.5–4)
LYMPHOCYTES NFR BLD: 8.7 % (ref 20–42)
MAGNESIUM SERPL-MCNC: 1.9 MG/DL (ref 1.6–2.6)
MCH RBC QN AUTO: 29.4 PG (ref 26–35)
MCHC RBC AUTO-ENTMCNC: 32.2 % (ref 32–34.5)
MCV RBC AUTO: 91.4 FL (ref 80–99.9)
MONOCYTES # BLD: 0.44 E9/L (ref 0.1–0.95)
MONOCYTES NFR BLD: 5.2 % (ref 2–12)
NEUTROPHILS # BLD: 7.39 E9/L (ref 1.8–7.3)
NEUTS SEG NFR BLD: 84.4 % (ref 43–80)
OVALOCYTES: ABNORMAL
PHOSPHATE SERPL-MCNC: 3.5 MG/DL (ref 2.5–4.5)
PLATELET # BLD AUTO: 182 E9/L (ref 130–450)
PMV BLD AUTO: 9.8 FL (ref 7–12)
POIKILOCYTES: ABNORMAL
POLYCHROMASIA: ABNORMAL
POTASSIUM SERPL-SCNC: 4 MMOL/L (ref 3.5–5)
PROT SERPL-MCNC: 5.3 G/DL (ref 6.4–8.3)
RBC # BLD AUTO: 2.79 E12/L (ref 3.8–5.8)
SODIUM SERPL-SCNC: 134 MMOL/L (ref 132–146)
WBC # BLD: 8.8 E9/L (ref 4.5–11.5)

## 2023-05-11 PROCEDURE — 2500000003 HC RX 250 WO HCPCS

## 2023-05-11 PROCEDURE — 80053 COMPREHEN METABOLIC PANEL: CPT

## 2023-05-11 PROCEDURE — 6370000000 HC RX 637 (ALT 250 FOR IP)

## 2023-05-11 PROCEDURE — 85025 COMPLETE CBC W/AUTO DIFF WBC: CPT

## 2023-05-11 PROCEDURE — 2580000003 HC RX 258: Performed by: STUDENT IN AN ORGANIZED HEALTH CARE EDUCATION/TRAINING PROGRAM

## 2023-05-11 PROCEDURE — 97535 SELF CARE MNGMENT TRAINING: CPT

## 2023-05-11 PROCEDURE — 2700000000 HC OXYGEN THERAPY PER DAY

## 2023-05-11 PROCEDURE — 82330 ASSAY OF CALCIUM: CPT

## 2023-05-11 PROCEDURE — 97530 THERAPEUTIC ACTIVITIES: CPT

## 2023-05-11 PROCEDURE — 97110 THERAPEUTIC EXERCISES: CPT

## 2023-05-11 PROCEDURE — 6360000002 HC RX W HCPCS: Performed by: SURGERY

## 2023-05-11 PROCEDURE — 99233 SBSQ HOSP IP/OBS HIGH 50: CPT | Performed by: SURGERY

## 2023-05-11 PROCEDURE — 6370000000 HC RX 637 (ALT 250 FOR IP): Performed by: STUDENT IN AN ORGANIZED HEALTH CARE EDUCATION/TRAINING PROGRAM

## 2023-05-11 PROCEDURE — 84100 ASSAY OF PHOSPHORUS: CPT

## 2023-05-11 PROCEDURE — 36415 COLL VENOUS BLD VENIPUNCTURE: CPT

## 2023-05-11 PROCEDURE — 2580000003 HC RX 258

## 2023-05-11 PROCEDURE — 83735 ASSAY OF MAGNESIUM: CPT

## 2023-05-11 PROCEDURE — 2000000000 HC ICU R&B

## 2023-05-11 PROCEDURE — 6360000002 HC RX W HCPCS: Performed by: STUDENT IN AN ORGANIZED HEALTH CARE EDUCATION/TRAINING PROGRAM

## 2023-05-11 RX ORDER — MAGNESIUM SULFATE IN WATER 40 MG/ML
2000 INJECTION, SOLUTION INTRAVENOUS ONCE
Status: COMPLETED | OUTPATIENT
Start: 2023-05-11 | End: 2023-05-11

## 2023-05-11 RX ORDER — METHOCARBAMOL 750 MG/1
1500 TABLET, FILM COATED ORAL 3 TIMES DAILY
Status: DISCONTINUED | OUTPATIENT
Start: 2023-05-11 | End: 2023-05-18 | Stop reason: HOSPADM

## 2023-05-11 RX ADMIN — ENOXAPARIN SODIUM 30 MG: 100 INJECTION SUBCUTANEOUS at 08:32

## 2023-05-11 RX ADMIN — HYDROMORPHONE HYDROCHLORIDE 0.5 MG: 1 INJECTION, SOLUTION INTRAMUSCULAR; INTRAVENOUS; SUBCUTANEOUS at 11:19

## 2023-05-11 RX ADMIN — POLYMYXIN B SULFATE, BACITRACIN ZINC, NEOMYCIN SULFATE: 5000; 3.5; 4 OINTMENT TOPICAL at 20:01

## 2023-05-11 RX ADMIN — METHOCARBAMOL 1000 MG: 100 INJECTION, SOLUTION INTRAMUSCULAR; INTRAVENOUS at 04:41

## 2023-05-11 RX ADMIN — METHOCARBAMOL 1500 MG: 750 TABLET ORAL at 20:03

## 2023-05-11 RX ADMIN — ACETAMINOPHEN 650 MG: 325 TABLET ORAL at 20:02

## 2023-05-11 RX ADMIN — POLYMYXIN B SULFATE, BACITRACIN ZINC, NEOMYCIN SULFATE: 5000; 3.5; 4 OINTMENT TOPICAL at 08:34

## 2023-05-11 RX ADMIN — OXYCODONE HYDROCHLORIDE 10 MG: 10 TABLET ORAL at 09:33

## 2023-05-11 RX ADMIN — HYDROMORPHONE HYDROCHLORIDE 0.5 MG: 1 INJECTION, SOLUTION INTRAMUSCULAR; INTRAVENOUS; SUBCUTANEOUS at 08:19

## 2023-05-11 RX ADMIN — ACETAMINOPHEN 650 MG: 325 TABLET ORAL at 01:21

## 2023-05-11 RX ADMIN — BACITRACIN ZINC: 500 OINTMENT TOPICAL at 14:07

## 2023-05-11 RX ADMIN — SODIUM CHLORIDE, PRESERVATIVE FREE 10 ML: 5 INJECTION INTRAVENOUS at 20:02

## 2023-05-11 RX ADMIN — MAGNESIUM HYDROXIDE 60 ML: 400 SUSPENSION ORAL at 18:09

## 2023-05-11 RX ADMIN — GABAPENTIN 300 MG: 300 CAPSULE ORAL at 21:53

## 2023-05-11 RX ADMIN — OXYCODONE HYDROCHLORIDE 10 MG: 10 TABLET ORAL at 00:34

## 2023-05-11 RX ADMIN — HYDROMORPHONE HYDROCHLORIDE 0.5 MG: 1 INJECTION, SOLUTION INTRAMUSCULAR; INTRAVENOUS; SUBCUTANEOUS at 19:59

## 2023-05-11 RX ADMIN — ACETAMINOPHEN 650 MG: 325 TABLET ORAL at 08:33

## 2023-05-11 RX ADMIN — POLYETHYLENE GLYCOL 3350 17 G: 17 POWDER, FOR SOLUTION ORAL at 08:32

## 2023-05-11 RX ADMIN — ACETAMINOPHEN 650 MG: 325 TABLET ORAL at 14:03

## 2023-05-11 RX ADMIN — HYDROMORPHONE HYDROCHLORIDE 0.5 MG: 1 INJECTION, SOLUTION INTRAMUSCULAR; INTRAVENOUS; SUBCUTANEOUS at 05:19

## 2023-05-11 RX ADMIN — BACITRACIN ZINC: 500 OINTMENT TOPICAL at 20:00

## 2023-05-11 RX ADMIN — SODIUM CHLORIDE, PRESERVATIVE FREE 10 ML: 5 INJECTION INTRAVENOUS at 08:33

## 2023-05-11 RX ADMIN — OXYCODONE HYDROCHLORIDE 10 MG: 10 TABLET ORAL at 21:53

## 2023-05-11 RX ADMIN — OXYCODONE HYDROCHLORIDE 10 MG: 10 TABLET ORAL at 14:03

## 2023-05-11 RX ADMIN — SODIUM CHLORIDE, PRESERVATIVE FREE 10 ML: 5 INJECTION INTRAVENOUS at 19:58

## 2023-05-11 RX ADMIN — HYDROMORPHONE HYDROCHLORIDE 0.5 MG: 1 INJECTION, SOLUTION INTRAMUSCULAR; INTRAVENOUS; SUBCUTANEOUS at 01:21

## 2023-05-11 RX ADMIN — OXYCODONE HYDROCHLORIDE 10 MG: 10 TABLET ORAL at 04:34

## 2023-05-11 RX ADMIN — BACITRACIN ZINC: 500 OINTMENT TOPICAL at 08:34

## 2023-05-11 RX ADMIN — GABAPENTIN 300 MG: 300 CAPSULE ORAL at 14:03

## 2023-05-11 RX ADMIN — METHOCARBAMOL 1000 MG: 100 INJECTION, SOLUTION INTRAMUSCULAR; INTRAVENOUS at 12:17

## 2023-05-11 RX ADMIN — Medication 10 MG: at 08:33

## 2023-05-11 RX ADMIN — GABAPENTIN 300 MG: 300 CAPSULE ORAL at 06:03

## 2023-05-11 RX ADMIN — HYDROMORPHONE HYDROCHLORIDE 0.5 MG: 1 INJECTION, SOLUTION INTRAMUSCULAR; INTRAVENOUS; SUBCUTANEOUS at 14:58

## 2023-05-11 RX ADMIN — ENOXAPARIN SODIUM 30 MG: 100 INJECTION SUBCUTANEOUS at 20:01

## 2023-05-11 RX ADMIN — NALOXEGOL OXALATE 25 MG: 25 TABLET, FILM COATED ORAL at 06:03

## 2023-05-11 RX ADMIN — OXYCODONE HYDROCHLORIDE 10 MG: 10 TABLET ORAL at 18:09

## 2023-05-11 RX ADMIN — MAGNESIUM SULFATE HEPTAHYDRATE 2000 MG: 40 INJECTION, SOLUTION INTRAVENOUS at 12:16

## 2023-05-11 ASSESSMENT — PAIN DESCRIPTION - PAIN TYPE
TYPE: ACUTE PAIN;SURGICAL PAIN

## 2023-05-11 ASSESSMENT — PAIN SCALES - GENERAL
PAINLEVEL_OUTOF10: 8
PAINLEVEL_OUTOF10: 8
PAINLEVEL_OUTOF10: 10
PAINLEVEL_OUTOF10: 10
PAINLEVEL_OUTOF10: 8
PAINLEVEL_OUTOF10: 9
PAINLEVEL_OUTOF10: 8
PAINLEVEL_OUTOF10: 8
PAINLEVEL_OUTOF10: 9
PAINLEVEL_OUTOF10: 9
PAINLEVEL_OUTOF10: 8
PAINLEVEL_OUTOF10: 8

## 2023-05-11 ASSESSMENT — PAIN DESCRIPTION - ORIENTATION
ORIENTATION: MID
ORIENTATION: ANTERIOR;LOWER
ORIENTATION: MID
ORIENTATION: ANTERIOR;LOWER

## 2023-05-11 ASSESSMENT — PAIN DESCRIPTION - LOCATION
LOCATION: PELVIS
LOCATION: BACK;PELVIS;LEG
LOCATION: PELVIS
LOCATION: BACK;PELVIS;LEG

## 2023-05-11 ASSESSMENT — PAIN DESCRIPTION - FREQUENCY
FREQUENCY: CONTINUOUS

## 2023-05-11 ASSESSMENT — PAIN - FUNCTIONAL ASSESSMENT
PAIN_FUNCTIONAL_ASSESSMENT: PREVENTS OR INTERFERES SOME ACTIVE ACTIVITIES AND ADLS

## 2023-05-11 ASSESSMENT — PAIN DESCRIPTION - DESCRIPTORS
DESCRIPTORS: ACHING;CRUSHING;SORE
DESCRIPTORS: SHOOTING;SORE;ACHING
DESCRIPTORS: ACHING;DISCOMFORT
DESCRIPTORS: ACHING;DISCOMFORT;CRAMPING
DESCRIPTORS: ACHING;DISCOMFORT
DESCRIPTORS: ACHING;DISCOMFORT;SHOOTING;SORE
DESCRIPTORS: ACHING;TENDER;DISCOMFORT
DESCRIPTORS: ACHING;CRAMPING;DISCOMFORT
DESCRIPTORS: ACHING;DISCOMFORT;SORE;TENDER;SHOOTING
DESCRIPTORS: ACHING;DISCOMFORT;SHARP
DESCRIPTORS: ACHING;DISCOMFORT
DESCRIPTORS: ACHING;DISCOMFORT;CRAMPING

## 2023-05-11 ASSESSMENT — PAIN DESCRIPTION - ONSET
ONSET: ON-GOING

## 2023-05-12 LAB
ALBUMIN SERPL-MCNC: 2.5 G/DL (ref 3.5–5.2)
ALP SERPL-CCNC: 103 U/L (ref 40–129)
ALT SERPL-CCNC: 39 U/L (ref 0–40)
ANION GAP SERPL CALCULATED.3IONS-SCNC: 7 MMOL/L (ref 7–16)
AST SERPL-CCNC: 54 U/L (ref 0–39)
BASOPHILS # BLD: 0.08 E9/L (ref 0–0.2)
BASOPHILS NFR BLD: 0.9 % (ref 0–2)
BILIRUB SERPL-MCNC: 0.9 MG/DL (ref 0–1.2)
BUN SERPL-MCNC: 13 MG/DL (ref 6–20)
CA-I BLD-SCNC: 1.11 MMOL/L (ref 1.15–1.33)
CALCIUM SERPL-MCNC: 8 MG/DL (ref 8.6–10.2)
CHLORIDE SERPL-SCNC: 93 MMOL/L (ref 98–107)
CO2 SERPL-SCNC: 27 MMOL/L (ref 22–29)
CREAT SERPL-MCNC: 0.6 MG/DL (ref 0.7–1.2)
EOSINOPHIL # BLD: 0.08 E9/L (ref 0.05–0.5)
EOSINOPHIL NFR BLD: 0.9 % (ref 0–6)
ERYTHROCYTE [DISTWIDTH] IN BLOOD BY AUTOMATED COUNT: 13.1 FL (ref 11.5–15)
GLUCOSE SERPL-MCNC: 121 MG/DL (ref 74–99)
HCT VFR BLD AUTO: 27 % (ref 37–54)
HGB BLD-MCNC: 8.9 G/DL (ref 12.5–16.5)
LYMPHOCYTES # BLD: 0.94 E9/L (ref 1.5–4)
LYMPHOCYTES NFR BLD: 9.7 % (ref 20–42)
MAGNESIUM SERPL-MCNC: 2.4 MG/DL (ref 1.6–2.6)
MCH RBC QN AUTO: 29.8 PG (ref 26–35)
MCHC RBC AUTO-ENTMCNC: 33 % (ref 32–34.5)
MCV RBC AUTO: 90.3 FL (ref 80–99.9)
METAMYELOCYTES NFR BLD MANUAL: 2.6 % (ref 0–1)
MONOCYTES # BLD: 1.03 E9/L (ref 0.1–0.95)
MONOCYTES NFR BLD: 11.4 % (ref 2–12)
MYELOCYTES NFR BLD MANUAL: 2.6 % (ref 0–0)
NEUTROPHILS # BLD: 7.24 E9/L (ref 1.8–7.3)
NEUTS SEG NFR BLD: 71.9 % (ref 43–80)
NRBC BLD-RTO: 0.9 /100 WBC
OVALOCYTES: ABNORMAL
PHOSPHATE SERPL-MCNC: 3.1 MG/DL (ref 2.5–4.5)
PLATELET # BLD AUTO: 272 E9/L (ref 130–450)
PMV BLD AUTO: 9.9 FL (ref 7–12)
POIKILOCYTES: ABNORMAL
POLYCHROMASIA: ABNORMAL
POTASSIUM SERPL-SCNC: 4.2 MMOL/L (ref 3.5–5)
PROT SERPL-MCNC: 5.7 G/DL (ref 6.4–8.3)
RBC # BLD AUTO: 2.99 E12/L (ref 3.8–5.8)
SODIUM SERPL-SCNC: 127 MMOL/L (ref 132–146)
WBC # BLD: 9.4 E9/L (ref 4.5–11.5)

## 2023-05-12 PROCEDURE — 2060000000 HC ICU INTERMEDIATE R&B

## 2023-05-12 PROCEDURE — 6370000000 HC RX 637 (ALT 250 FOR IP): Performed by: SURGERY

## 2023-05-12 PROCEDURE — 82330 ASSAY OF CALCIUM: CPT

## 2023-05-12 PROCEDURE — 97110 THERAPEUTIC EXERCISES: CPT

## 2023-05-12 PROCEDURE — 97535 SELF CARE MNGMENT TRAINING: CPT

## 2023-05-12 PROCEDURE — 2580000003 HC RX 258

## 2023-05-12 PROCEDURE — 6370000000 HC RX 637 (ALT 250 FOR IP)

## 2023-05-12 PROCEDURE — 84100 ASSAY OF PHOSPHORUS: CPT

## 2023-05-12 PROCEDURE — 2700000000 HC OXYGEN THERAPY PER DAY

## 2023-05-12 PROCEDURE — 97530 THERAPEUTIC ACTIVITIES: CPT

## 2023-05-12 PROCEDURE — 6360000002 HC RX W HCPCS: Performed by: SURGERY

## 2023-05-12 PROCEDURE — 99232 SBSQ HOSP IP/OBS MODERATE 35: CPT | Performed by: SURGERY

## 2023-05-12 PROCEDURE — 83735 ASSAY OF MAGNESIUM: CPT

## 2023-05-12 PROCEDURE — 6370000000 HC RX 637 (ALT 250 FOR IP): Performed by: STUDENT IN AN ORGANIZED HEALTH CARE EDUCATION/TRAINING PROGRAM

## 2023-05-12 PROCEDURE — 80053 COMPREHEN METABOLIC PANEL: CPT

## 2023-05-12 PROCEDURE — 85025 COMPLETE CBC W/AUTO DIFF WBC: CPT

## 2023-05-12 PROCEDURE — 6360000002 HC RX W HCPCS: Performed by: STUDENT IN AN ORGANIZED HEALTH CARE EDUCATION/TRAINING PROGRAM

## 2023-05-12 PROCEDURE — 2500000003 HC RX 250 WO HCPCS

## 2023-05-12 PROCEDURE — 2580000003 HC RX 258: Performed by: STUDENT IN AN ORGANIZED HEALTH CARE EDUCATION/TRAINING PROGRAM

## 2023-05-12 RX ORDER — OXYCODONE HYDROCHLORIDE 15 MG/1
15 TABLET ORAL EVERY 4 HOURS PRN
Status: DISCONTINUED | OUTPATIENT
Start: 2023-05-12 | End: 2023-05-18 | Stop reason: HOSPADM

## 2023-05-12 RX ORDER — CALCIUM GLUCONATE 10 MG/ML
1000 INJECTION, SOLUTION INTRAVENOUS ONCE
Status: COMPLETED | OUTPATIENT
Start: 2023-05-12 | End: 2023-05-12

## 2023-05-12 RX ORDER — OXYCODONE HYDROCHLORIDE 10 MG/1
10 TABLET ORAL EVERY 4 HOURS PRN
Status: DISCONTINUED | OUTPATIENT
Start: 2023-05-12 | End: 2023-05-18 | Stop reason: HOSPADM

## 2023-05-12 RX ORDER — KETOROLAC TROMETHAMINE 30 MG/ML
30 INJECTION, SOLUTION INTRAMUSCULAR; INTRAVENOUS EVERY 6 HOURS
Status: COMPLETED | OUTPATIENT
Start: 2023-05-12 | End: 2023-05-17

## 2023-05-12 RX ADMIN — POLYMYXIN B SULFATE, BACITRACIN ZINC, NEOMYCIN SULFATE: 5000; 3.5; 4 OINTMENT TOPICAL at 19:37

## 2023-05-12 RX ADMIN — ENOXAPARIN SODIUM 30 MG: 100 INJECTION SUBCUTANEOUS at 19:31

## 2023-05-12 RX ADMIN — METHOCARBAMOL 1500 MG: 750 TABLET ORAL at 19:32

## 2023-05-12 RX ADMIN — BACITRACIN ZINC: 500 OINTMENT TOPICAL at 19:31

## 2023-05-12 RX ADMIN — GABAPENTIN 300 MG: 300 CAPSULE ORAL at 13:30

## 2023-05-12 RX ADMIN — SODIUM CHLORIDE, PRESERVATIVE FREE 10 ML: 5 INJECTION INTRAVENOUS at 08:00

## 2023-05-12 RX ADMIN — Medication 10 MG: at 07:58

## 2023-05-12 RX ADMIN — BACITRACIN ZINC: 500 OINTMENT TOPICAL at 08:00

## 2023-05-12 RX ADMIN — HYDROMORPHONE HYDROCHLORIDE 0.5 MG: 1 INJECTION, SOLUTION INTRAMUSCULAR; INTRAVENOUS; SUBCUTANEOUS at 04:28

## 2023-05-12 RX ADMIN — OXYCODONE HYDROCHLORIDE 15 MG: 15 TABLET ORAL at 14:44

## 2023-05-12 RX ADMIN — NALOXEGOL OXALATE 25 MG: 25 TABLET, FILM COATED ORAL at 06:14

## 2023-05-12 RX ADMIN — OXYCODONE HYDROCHLORIDE 10 MG: 10 TABLET ORAL at 10:45

## 2023-05-12 RX ADMIN — HYDROMORPHONE HYDROCHLORIDE 0.5 MG: 1 INJECTION, SOLUTION INTRAMUSCULAR; INTRAVENOUS; SUBCUTANEOUS at 07:55

## 2023-05-12 RX ADMIN — ACETAMINOPHEN 650 MG: 325 TABLET ORAL at 07:58

## 2023-05-12 RX ADMIN — OXYCODONE HYDROCHLORIDE 15 MG: 15 TABLET ORAL at 18:46

## 2023-05-12 RX ADMIN — METHOCARBAMOL 1500 MG: 750 TABLET ORAL at 13:30

## 2023-05-12 RX ADMIN — POLYETHYLENE GLYCOL 3350 17 G: 17 POWDER, FOR SOLUTION ORAL at 07:58

## 2023-05-12 RX ADMIN — KETOROLAC TROMETHAMINE 30 MG: 30 INJECTION, SOLUTION INTRAMUSCULAR; INTRAVENOUS at 19:32

## 2023-05-12 RX ADMIN — OXYCODONE HYDROCHLORIDE 15 MG: 15 TABLET ORAL at 22:38

## 2023-05-12 RX ADMIN — ENOXAPARIN SODIUM 30 MG: 100 INJECTION SUBCUTANEOUS at 07:59

## 2023-05-12 RX ADMIN — ACETAMINOPHEN 650 MG: 325 TABLET ORAL at 13:30

## 2023-05-12 RX ADMIN — GABAPENTIN 300 MG: 300 CAPSULE ORAL at 21:49

## 2023-05-12 RX ADMIN — HYDROMORPHONE HYDROCHLORIDE 0.5 MG: 1 INJECTION, SOLUTION INTRAMUSCULAR; INTRAVENOUS; SUBCUTANEOUS at 00:15

## 2023-05-12 RX ADMIN — CALCIUM GLUCONATE 1000 MG: 10 INJECTION, SOLUTION INTRAVENOUS at 18:02

## 2023-05-12 RX ADMIN — BACITRACIN ZINC: 500 OINTMENT TOPICAL at 13:32

## 2023-05-12 RX ADMIN — ACETAMINOPHEN 650 MG: 325 TABLET ORAL at 02:04

## 2023-05-12 RX ADMIN — OXYCODONE HYDROCHLORIDE 10 MG: 10 TABLET ORAL at 02:04

## 2023-05-12 RX ADMIN — OXYCODONE HYDROCHLORIDE 10 MG: 10 TABLET ORAL at 06:14

## 2023-05-12 RX ADMIN — METHOCARBAMOL 1500 MG: 750 TABLET ORAL at 07:58

## 2023-05-12 RX ADMIN — GABAPENTIN 300 MG: 300 CAPSULE ORAL at 06:14

## 2023-05-12 RX ADMIN — KETOROLAC TROMETHAMINE 30 MG: 30 INJECTION, SOLUTION INTRAMUSCULAR; INTRAVENOUS at 15:33

## 2023-05-12 RX ADMIN — POLYMYXIN B SULFATE, BACITRACIN ZINC, NEOMYCIN SULFATE: 5000; 3.5; 4 OINTMENT TOPICAL at 07:59

## 2023-05-12 RX ADMIN — ACETAMINOPHEN 650 MG: 325 TABLET ORAL at 19:31

## 2023-05-12 ASSESSMENT — PAIN DESCRIPTION - LOCATION
LOCATION: LEG;PELVIS
LOCATION: PELVIS
LOCATION: PELVIS
LOCATION: BACK;PELVIS;LEG
LOCATION: LEG;PELVIS
LOCATION: PELVIS
LOCATION: LEG;PELVIS

## 2023-05-12 ASSESSMENT — PAIN - FUNCTIONAL ASSESSMENT
PAIN_FUNCTIONAL_ASSESSMENT: PREVENTS OR INTERFERES WITH MANY ACTIVE NOT PASSIVE ACTIVITIES
PAIN_FUNCTIONAL_ASSESSMENT: PREVENTS OR INTERFERES WITH MANY ACTIVE NOT PASSIVE ACTIVITIES
PAIN_FUNCTIONAL_ASSESSMENT: PREVENTS OR INTERFERES WITH ALL ACTIVE AND SOME PASSIVE ACTIVITIES
PAIN_FUNCTIONAL_ASSESSMENT: PREVENTS OR INTERFERES SOME ACTIVE ACTIVITIES AND ADLS
PAIN_FUNCTIONAL_ASSESSMENT: PREVENTS OR INTERFERES WITH MANY ACTIVE NOT PASSIVE ACTIVITIES

## 2023-05-12 ASSESSMENT — PAIN DESCRIPTION - ONSET
ONSET: ON-GOING

## 2023-05-12 ASSESSMENT — PAIN DESCRIPTION - DESCRIPTORS
DESCRIPTORS: ACHING;DISCOMFORT
DESCRIPTORS: ACHING;CRAMPING;DISCOMFORT
DESCRIPTORS: ACHING;DISCOMFORT
DESCRIPTORS: ACHING;DISCOMFORT;NAGGING;SORE
DESCRIPTORS: ACHING;DISCOMFORT
DESCRIPTORS: ACHING;DISCOMFORT;SORE;NAGGING

## 2023-05-12 ASSESSMENT — PAIN SCALES - GENERAL
PAINLEVEL_OUTOF10: 8
PAINLEVEL_OUTOF10: 6
PAINLEVEL_OUTOF10: 8
PAINLEVEL_OUTOF10: 0
PAINLEVEL_OUTOF10: 0
PAINLEVEL_OUTOF10: 6
PAINLEVEL_OUTOF10: 2
PAINLEVEL_OUTOF10: 8
PAINLEVEL_OUTOF10: 10
PAINLEVEL_OUTOF10: 10
PAINLEVEL_OUTOF10: 0
PAINLEVEL_OUTOF10: 8

## 2023-05-12 ASSESSMENT — PAIN DESCRIPTION - FREQUENCY
FREQUENCY: CONTINUOUS

## 2023-05-12 ASSESSMENT — PAIN DESCRIPTION - PAIN TYPE
TYPE: ACUTE PAIN;SURGICAL PAIN

## 2023-05-12 ASSESSMENT — PAIN DESCRIPTION - ORIENTATION
ORIENTATION: LOWER;MID
ORIENTATION: MID
ORIENTATION: MID
ORIENTATION: MID;LOWER
ORIENTATION: MID
ORIENTATION: MID;LOWER
ORIENTATION: MID;LOWER

## 2023-05-13 LAB
ALBUMIN SERPL-MCNC: 2.6 G/DL (ref 3.5–5.2)
ALP SERPL-CCNC: 106 U/L (ref 40–129)
ALT SERPL-CCNC: 35 U/L (ref 0–40)
ANION GAP SERPL CALCULATED.3IONS-SCNC: 8 MMOL/L (ref 7–16)
ANISOCYTOSIS: ABNORMAL
AST SERPL-CCNC: 39 U/L (ref 0–39)
BACTERIA BLD CULT ORG #2: NORMAL
BACTERIA BLD CULT: NORMAL
BASOPHILS # BLD: 0 E9/L (ref 0–0.2)
BASOPHILS NFR BLD: 0.3 % (ref 0–2)
BILIRUB SERPL-MCNC: 0.7 MG/DL (ref 0–1.2)
BUN SERPL-MCNC: 13 MG/DL (ref 6–20)
CA-I BLD-SCNC: 1.27 MMOL/L (ref 1.15–1.33)
CALCIUM SERPL-MCNC: 8.2 MG/DL (ref 8.6–10.2)
CHLORIDE SERPL-SCNC: 97 MMOL/L (ref 98–107)
CO2 SERPL-SCNC: 27 MMOL/L (ref 22–29)
CREAT SERPL-MCNC: 0.7 MG/DL (ref 0.7–1.2)
EOSINOPHIL # BLD: 0.08 E9/L (ref 0.05–0.5)
EOSINOPHIL NFR BLD: 0.9 % (ref 0–6)
ERYTHROCYTE [DISTWIDTH] IN BLOOD BY AUTOMATED COUNT: 12.9 FL (ref 11.5–15)
GLUCOSE SERPL-MCNC: 120 MG/DL (ref 74–99)
HCT VFR BLD AUTO: 28 % (ref 37–54)
HGB BLD-MCNC: 9.2 G/DL (ref 12.5–16.5)
LYMPHOCYTES # BLD: 0.91 E9/L (ref 1.5–4)
LYMPHOCYTES NFR BLD: 9.5 % (ref 20–42)
MAGNESIUM SERPL-MCNC: 2.2 MG/DL (ref 1.6–2.6)
MCH RBC QN AUTO: 29.7 PG (ref 26–35)
MCHC RBC AUTO-ENTMCNC: 32.9 % (ref 32–34.5)
MCV RBC AUTO: 90.3 FL (ref 80–99.9)
MONOCYTES # BLD: 0.27 E9/L (ref 0.1–0.95)
MONOCYTES NFR BLD: 2.6 % (ref 2–12)
MYELOCYTES NFR BLD MANUAL: 3.5 % (ref 0–0)
NEUTROPHILS # BLD: 7.92 E9/L (ref 1.8–7.3)
NEUTS SEG NFR BLD: 83.5 % (ref 43–80)
OVALOCYTES: ABNORMAL
PHOSPHATE SERPL-MCNC: 3.3 MG/DL (ref 2.5–4.5)
PLATELET # BLD AUTO: 367 E9/L (ref 130–450)
PMV BLD AUTO: 9.6 FL (ref 7–12)
POIKILOCYTES: ABNORMAL
POLYCHROMASIA: ABNORMAL
POTASSIUM SERPL-SCNC: 4.4 MMOL/L (ref 3.5–5)
PROT SERPL-MCNC: 6.1 G/DL (ref 6.4–8.3)
RBC # BLD AUTO: 3.1 E12/L (ref 3.8–5.8)
SODIUM SERPL-SCNC: 132 MMOL/L (ref 132–146)
WBC # BLD: 9.1 E9/L (ref 4.5–11.5)

## 2023-05-13 PROCEDURE — 99232 SBSQ HOSP IP/OBS MODERATE 35: CPT | Performed by: SURGERY

## 2023-05-13 PROCEDURE — 6370000000 HC RX 637 (ALT 250 FOR IP): Performed by: SURGERY

## 2023-05-13 PROCEDURE — 2060000000 HC ICU INTERMEDIATE R&B

## 2023-05-13 PROCEDURE — 6370000000 HC RX 637 (ALT 250 FOR IP)

## 2023-05-13 PROCEDURE — 6360000002 HC RX W HCPCS: Performed by: SURGERY

## 2023-05-13 PROCEDURE — 83735 ASSAY OF MAGNESIUM: CPT

## 2023-05-13 PROCEDURE — 80053 COMPREHEN METABOLIC PANEL: CPT

## 2023-05-13 PROCEDURE — 6360000002 HC RX W HCPCS: Performed by: STUDENT IN AN ORGANIZED HEALTH CARE EDUCATION/TRAINING PROGRAM

## 2023-05-13 PROCEDURE — 85025 COMPLETE CBC W/AUTO DIFF WBC: CPT

## 2023-05-13 PROCEDURE — 82330 ASSAY OF CALCIUM: CPT

## 2023-05-13 PROCEDURE — 2580000003 HC RX 258: Performed by: STUDENT IN AN ORGANIZED HEALTH CARE EDUCATION/TRAINING PROGRAM

## 2023-05-13 PROCEDURE — 2500000003 HC RX 250 WO HCPCS

## 2023-05-13 PROCEDURE — 36415 COLL VENOUS BLD VENIPUNCTURE: CPT

## 2023-05-13 PROCEDURE — 6370000000 HC RX 637 (ALT 250 FOR IP): Performed by: STUDENT IN AN ORGANIZED HEALTH CARE EDUCATION/TRAINING PROGRAM

## 2023-05-13 PROCEDURE — 84100 ASSAY OF PHOSPHORUS: CPT

## 2023-05-13 PROCEDURE — 2580000003 HC RX 258

## 2023-05-13 RX ADMIN — SODIUM CHLORIDE, PRESERVATIVE FREE 10 ML: 5 INJECTION INTRAVENOUS at 08:56

## 2023-05-13 RX ADMIN — HYDROMORPHONE HYDROCHLORIDE 0.5 MG: 1 INJECTION, SOLUTION INTRAMUSCULAR; INTRAVENOUS; SUBCUTANEOUS at 18:10

## 2023-05-13 RX ADMIN — OXYCODONE HYDROCHLORIDE 15 MG: 15 TABLET ORAL at 06:48

## 2023-05-13 RX ADMIN — GABAPENTIN 300 MG: 300 CAPSULE ORAL at 14:36

## 2023-05-13 RX ADMIN — METHOCARBAMOL 1500 MG: 750 TABLET ORAL at 08:53

## 2023-05-13 RX ADMIN — NALOXEGOL OXALATE 25 MG: 25 TABLET, FILM COATED ORAL at 06:48

## 2023-05-13 RX ADMIN — ACETAMINOPHEN 650 MG: 325 TABLET ORAL at 08:54

## 2023-05-13 RX ADMIN — SODIUM CHLORIDE, PRESERVATIVE FREE 10 ML: 5 INJECTION INTRAVENOUS at 20:51

## 2023-05-13 RX ADMIN — METHOCARBAMOL 1500 MG: 750 TABLET ORAL at 14:36

## 2023-05-13 RX ADMIN — OXYCODONE HYDROCHLORIDE 15 MG: 15 TABLET ORAL at 15:19

## 2023-05-13 RX ADMIN — ACETAMINOPHEN 650 MG: 325 TABLET ORAL at 02:12

## 2023-05-13 RX ADMIN — KETOROLAC TROMETHAMINE 30 MG: 30 INJECTION, SOLUTION INTRAMUSCULAR; INTRAVENOUS at 14:38

## 2023-05-13 RX ADMIN — ACETAMINOPHEN 650 MG: 325 TABLET ORAL at 20:51

## 2023-05-13 RX ADMIN — HYDROMORPHONE HYDROCHLORIDE 0.5 MG: 1 INJECTION, SOLUTION INTRAMUSCULAR; INTRAVENOUS; SUBCUTANEOUS at 22:58

## 2023-05-13 RX ADMIN — ENOXAPARIN SODIUM 30 MG: 100 INJECTION SUBCUTANEOUS at 20:51

## 2023-05-13 RX ADMIN — KETOROLAC TROMETHAMINE 30 MG: 30 INJECTION, SOLUTION INTRAMUSCULAR; INTRAVENOUS at 20:51

## 2023-05-13 RX ADMIN — OXYCODONE HYDROCHLORIDE 15 MG: 15 TABLET ORAL at 19:53

## 2023-05-13 RX ADMIN — GABAPENTIN 300 MG: 300 CAPSULE ORAL at 20:51

## 2023-05-13 RX ADMIN — GABAPENTIN 300 MG: 300 CAPSULE ORAL at 06:50

## 2023-05-13 RX ADMIN — POLYETHYLENE GLYCOL 3350 17 G: 17 POWDER, FOR SOLUTION ORAL at 08:52

## 2023-05-13 RX ADMIN — SODIUM CHLORIDE, PRESERVATIVE FREE 10 ML: 5 INJECTION INTRAVENOUS at 20:56

## 2023-05-13 RX ADMIN — METHOCARBAMOL 1500 MG: 750 TABLET ORAL at 20:51

## 2023-05-13 RX ADMIN — OXYCODONE HYDROCHLORIDE 15 MG: 15 TABLET ORAL at 10:59

## 2023-05-13 RX ADMIN — ENOXAPARIN SODIUM 30 MG: 100 INJECTION SUBCUTANEOUS at 08:52

## 2023-05-13 RX ADMIN — KETOROLAC TROMETHAMINE 30 MG: 30 INJECTION, SOLUTION INTRAMUSCULAR; INTRAVENOUS at 08:54

## 2023-05-13 RX ADMIN — ACETAMINOPHEN 650 MG: 325 TABLET ORAL at 14:37

## 2023-05-13 RX ADMIN — KETOROLAC TROMETHAMINE 30 MG: 30 INJECTION, SOLUTION INTRAMUSCULAR; INTRAVENOUS at 03:47

## 2023-05-13 RX ADMIN — BACITRACIN ZINC: 500 OINTMENT TOPICAL at 20:56

## 2023-05-13 RX ADMIN — OXYCODONE HYDROCHLORIDE 15 MG: 15 TABLET ORAL at 02:57

## 2023-05-13 ASSESSMENT — PAIN DESCRIPTION - FREQUENCY
FREQUENCY: INTERMITTENT
FREQUENCY: CONTINUOUS
FREQUENCY: CONTINUOUS

## 2023-05-13 ASSESSMENT — PAIN SCALES - GENERAL
PAINLEVEL_OUTOF10: 8
PAINLEVEL_OUTOF10: 10
PAINLEVEL_OUTOF10: 9
PAINLEVEL_OUTOF10: 7
PAINLEVEL_OUTOF10: 10
PAINLEVEL_OUTOF10: 7
PAINLEVEL_OUTOF10: 7
PAINLEVEL_OUTOF10: 9
PAINLEVEL_OUTOF10: 9
PAINLEVEL_OUTOF10: 8
PAINLEVEL_OUTOF10: 9
PAINLEVEL_OUTOF10: 10
PAINLEVEL_OUTOF10: 10
PAINLEVEL_OUTOF10: 8
PAINLEVEL_OUTOF10: 9
PAINLEVEL_OUTOF10: 6

## 2023-05-13 ASSESSMENT — PAIN DESCRIPTION - DESCRIPTORS
DESCRIPTORS: ACHING;DISCOMFORT;SORE
DESCRIPTORS: ACHING;THROBBING;STABBING;SHOOTING
DESCRIPTORS: ACHING;DISCOMFORT;THROBBING
DESCRIPTORS: ACHING;DISCOMFORT;SORE
DESCRIPTORS: ACHING;THROBBING;STABBING
DESCRIPTORS: ACHING;THROBBING;DISCOMFORT
DESCRIPTORS: ACHING;THROBBING;STABBING
DESCRIPTORS: ACHING;DISCOMFORT;SORE;THROBBING
DESCRIPTORS: ACHING;DISCOMFORT;SORE
DESCRIPTORS: DISCOMFORT;ACHING;SORE;THROBBING

## 2023-05-13 ASSESSMENT — PAIN - FUNCTIONAL ASSESSMENT

## 2023-05-13 ASSESSMENT — PAIN DESCRIPTION - LOCATION
LOCATION: PELVIS;BACK
LOCATION: BACK;PELVIS
LOCATION: PELVIS;BACK
LOCATION: PELVIS;BACK

## 2023-05-13 ASSESSMENT — PAIN DESCRIPTION - PAIN TYPE
TYPE: ACUTE PAIN

## 2023-05-13 ASSESSMENT — PAIN DESCRIPTION - ONSET
ONSET: ON-GOING

## 2023-05-13 ASSESSMENT — PAIN DESCRIPTION - ORIENTATION
ORIENTATION: RIGHT;LEFT
ORIENTATION: RIGHT
ORIENTATION: RIGHT;LEFT

## 2023-05-14 LAB
ALBUMIN SERPL-MCNC: 2.5 G/DL (ref 3.5–5.2)
ALP SERPL-CCNC: 132 U/L (ref 40–129)
ALT SERPL-CCNC: 37 U/L (ref 0–40)
ANION GAP SERPL CALCULATED.3IONS-SCNC: 11 MMOL/L (ref 7–16)
AST SERPL-CCNC: 39 U/L (ref 0–39)
BASOPHILS # BLD: 0.05 E9/L (ref 0–0.2)
BASOPHILS NFR BLD: 0.4 % (ref 0–2)
BILIRUB SERPL-MCNC: 0.9 MG/DL (ref 0–1.2)
BUN SERPL-MCNC: 15 MG/DL (ref 6–20)
CA-I BLD-SCNC: 1.21 MMOL/L (ref 1.15–1.33)
CALCIUM SERPL-MCNC: 8.2 MG/DL (ref 8.6–10.2)
CHLORIDE SERPL-SCNC: 100 MMOL/L (ref 98–107)
CO2 SERPL-SCNC: 25 MMOL/L (ref 22–29)
CREAT SERPL-MCNC: 0.8 MG/DL (ref 0.7–1.2)
EOSINOPHIL # BLD: 0.05 E9/L (ref 0.05–0.5)
EOSINOPHIL NFR BLD: 0.4 % (ref 0–6)
ERYTHROCYTE [DISTWIDTH] IN BLOOD BY AUTOMATED COUNT: 12.9 FL (ref 11.5–15)
GLUCOSE SERPL-MCNC: 115 MG/DL (ref 74–99)
HCT VFR BLD AUTO: 27.9 % (ref 37–54)
HGB BLD-MCNC: 9.2 G/DL (ref 12.5–16.5)
IMM GRANULOCYTES # BLD: 0.43 E9/L
IMM GRANULOCYTES NFR BLD: 3.8 % (ref 0–5)
LYMPHOCYTES # BLD: 0.98 E9/L (ref 1.5–4)
LYMPHOCYTES NFR BLD: 8.6 % (ref 20–42)
MAGNESIUM SERPL-MCNC: 2.2 MG/DL (ref 1.6–2.6)
MCH RBC QN AUTO: 29.9 PG (ref 26–35)
MCHC RBC AUTO-ENTMCNC: 33 % (ref 32–34.5)
MCV RBC AUTO: 90.6 FL (ref 80–99.9)
MONOCYTES # BLD: 0.3 E9/L (ref 0.1–0.95)
MONOCYTES NFR BLD: 2.6 % (ref 2–12)
NEUTROPHILS # BLD: 9.63 E9/L (ref 1.8–7.3)
NEUTS SEG NFR BLD: 84.2 % (ref 43–80)
PHOSPHATE SERPL-MCNC: 4.1 MG/DL (ref 2.5–4.5)
PLATELET # BLD AUTO: 440 E9/L (ref 130–450)
PMV BLD AUTO: 9.6 FL (ref 7–12)
POTASSIUM SERPL-SCNC: 4.8 MMOL/L (ref 3.5–5)
PROT SERPL-MCNC: 6.2 G/DL (ref 6.4–8.3)
RBC # BLD AUTO: 3.08 E12/L (ref 3.8–5.8)
SODIUM SERPL-SCNC: 136 MMOL/L (ref 132–146)
WBC # BLD: 11.4 E9/L (ref 4.5–11.5)

## 2023-05-14 PROCEDURE — 82330 ASSAY OF CALCIUM: CPT

## 2023-05-14 PROCEDURE — 6370000000 HC RX 637 (ALT 250 FOR IP)

## 2023-05-14 PROCEDURE — 6370000000 HC RX 637 (ALT 250 FOR IP): Performed by: SURGERY

## 2023-05-14 PROCEDURE — 2580000003 HC RX 258: Performed by: STUDENT IN AN ORGANIZED HEALTH CARE EDUCATION/TRAINING PROGRAM

## 2023-05-14 PROCEDURE — 2580000003 HC RX 258

## 2023-05-14 PROCEDURE — 2060000000 HC ICU INTERMEDIATE R&B

## 2023-05-14 PROCEDURE — 6370000000 HC RX 637 (ALT 250 FOR IP): Performed by: STUDENT IN AN ORGANIZED HEALTH CARE EDUCATION/TRAINING PROGRAM

## 2023-05-14 PROCEDURE — 36415 COLL VENOUS BLD VENIPUNCTURE: CPT

## 2023-05-14 PROCEDURE — 6360000002 HC RX W HCPCS: Performed by: STUDENT IN AN ORGANIZED HEALTH CARE EDUCATION/TRAINING PROGRAM

## 2023-05-14 PROCEDURE — 85025 COMPLETE CBC W/AUTO DIFF WBC: CPT

## 2023-05-14 PROCEDURE — 80053 COMPREHEN METABOLIC PANEL: CPT

## 2023-05-14 PROCEDURE — 99233 SBSQ HOSP IP/OBS HIGH 50: CPT | Performed by: SURGERY

## 2023-05-14 PROCEDURE — 6360000002 HC RX W HCPCS: Performed by: SURGERY

## 2023-05-14 PROCEDURE — 84100 ASSAY OF PHOSPHORUS: CPT

## 2023-05-14 PROCEDURE — 83735 ASSAY OF MAGNESIUM: CPT

## 2023-05-14 PROCEDURE — 2500000003 HC RX 250 WO HCPCS

## 2023-05-14 RX ADMIN — HYDROMORPHONE HYDROCHLORIDE 0.5 MG: 1 INJECTION, SOLUTION INTRAMUSCULAR; INTRAVENOUS; SUBCUTANEOUS at 15:41

## 2023-05-14 RX ADMIN — KETOROLAC TROMETHAMINE 30 MG: 30 INJECTION, SOLUTION INTRAMUSCULAR; INTRAVENOUS at 10:15

## 2023-05-14 RX ADMIN — OXYCODONE HYDROCHLORIDE 15 MG: 15 TABLET ORAL at 00:18

## 2023-05-14 RX ADMIN — BACITRACIN ZINC: 500 OINTMENT TOPICAL at 21:16

## 2023-05-14 RX ADMIN — NALOXEGOL OXALATE 25 MG: 25 TABLET, FILM COATED ORAL at 10:15

## 2023-05-14 RX ADMIN — OXYCODONE HYDROCHLORIDE 15 MG: 15 TABLET ORAL at 11:34

## 2023-05-14 RX ADMIN — ACETAMINOPHEN 650 MG: 325 TABLET ORAL at 20:00

## 2023-05-14 RX ADMIN — SODIUM CHLORIDE, PRESERVATIVE FREE 10 ML: 5 INJECTION INTRAVENOUS at 12:11

## 2023-05-14 RX ADMIN — HYDROMORPHONE HYDROCHLORIDE 0.5 MG: 1 INJECTION, SOLUTION INTRAMUSCULAR; INTRAVENOUS; SUBCUTANEOUS at 05:16

## 2023-05-14 RX ADMIN — ACETAMINOPHEN 650 MG: 325 TABLET ORAL at 03:05

## 2023-05-14 RX ADMIN — SODIUM CHLORIDE, PRESERVATIVE FREE 10 ML: 5 INJECTION INTRAVENOUS at 10:16

## 2023-05-14 RX ADMIN — SODIUM CHLORIDE, PRESERVATIVE FREE 10 ML: 5 INJECTION INTRAVENOUS at 21:17

## 2023-05-14 RX ADMIN — HYDROMORPHONE HYDROCHLORIDE 0.5 MG: 1 INJECTION, SOLUTION INTRAMUSCULAR; INTRAVENOUS; SUBCUTANEOUS at 12:11

## 2023-05-14 RX ADMIN — GABAPENTIN 300 MG: 300 CAPSULE ORAL at 05:15

## 2023-05-14 RX ADMIN — GABAPENTIN 300 MG: 300 CAPSULE ORAL at 14:02

## 2023-05-14 RX ADMIN — METHOCARBAMOL 1500 MG: 750 TABLET ORAL at 10:15

## 2023-05-14 RX ADMIN — SODIUM CHLORIDE, PRESERVATIVE FREE 10 ML: 5 INJECTION INTRAVENOUS at 18:49

## 2023-05-14 RX ADMIN — OXYCODONE HYDROCHLORIDE 15 MG: 15 TABLET ORAL at 21:10

## 2023-05-14 RX ADMIN — SODIUM CHLORIDE, PRESERVATIVE FREE 10 ML: 5 INJECTION INTRAVENOUS at 15:42

## 2023-05-14 RX ADMIN — OXYCODONE HYDROCHLORIDE 15 MG: 15 TABLET ORAL at 06:28

## 2023-05-14 RX ADMIN — HYDROMORPHONE HYDROCHLORIDE 0.5 MG: 1 INJECTION, SOLUTION INTRAMUSCULAR; INTRAVENOUS; SUBCUTANEOUS at 18:48

## 2023-05-14 RX ADMIN — ENOXAPARIN SODIUM 30 MG: 100 INJECTION SUBCUTANEOUS at 10:15

## 2023-05-14 RX ADMIN — METHOCARBAMOL 1500 MG: 750 TABLET ORAL at 14:01

## 2023-05-14 RX ADMIN — SODIUM CHLORIDE, PRESERVATIVE FREE 10 ML: 5 INJECTION INTRAVENOUS at 21:16

## 2023-05-14 RX ADMIN — OXYCODONE HYDROCHLORIDE 15 MG: 15 TABLET ORAL at 16:39

## 2023-05-14 RX ADMIN — HYDROMORPHONE HYDROCHLORIDE 0.5 MG: 1 INJECTION, SOLUTION INTRAMUSCULAR; INTRAVENOUS; SUBCUTANEOUS at 23:04

## 2023-05-14 RX ADMIN — KETOROLAC TROMETHAMINE 30 MG: 30 INJECTION, SOLUTION INTRAMUSCULAR; INTRAVENOUS at 03:06

## 2023-05-14 RX ADMIN — SODIUM CHLORIDE, PRESERVATIVE FREE 10 ML: 5 INJECTION INTRAVENOUS at 08:22

## 2023-05-14 RX ADMIN — KETOROLAC TROMETHAMINE 30 MG: 30 INJECTION, SOLUTION INTRAMUSCULAR; INTRAVENOUS at 14:02

## 2023-05-14 RX ADMIN — ACETAMINOPHEN 650 MG: 325 TABLET ORAL at 10:15

## 2023-05-14 RX ADMIN — HYDROMORPHONE HYDROCHLORIDE 0.5 MG: 1 INJECTION, SOLUTION INTRAMUSCULAR; INTRAVENOUS; SUBCUTANEOUS at 08:21

## 2023-05-14 RX ADMIN — ACETAMINOPHEN 650 MG: 325 TABLET ORAL at 14:02

## 2023-05-14 RX ADMIN — ENOXAPARIN SODIUM 30 MG: 100 INJECTION SUBCUTANEOUS at 21:09

## 2023-05-14 RX ADMIN — BACITRACIN ZINC: 500 OINTMENT TOPICAL at 14:02

## 2023-05-14 RX ADMIN — GABAPENTIN 300 MG: 300 CAPSULE ORAL at 21:09

## 2023-05-14 RX ADMIN — KETOROLAC TROMETHAMINE 30 MG: 30 INJECTION, SOLUTION INTRAMUSCULAR; INTRAVENOUS at 21:09

## 2023-05-14 RX ADMIN — METHOCARBAMOL 1500 MG: 750 TABLET ORAL at 21:09

## 2023-05-14 ASSESSMENT — PAIN DESCRIPTION - ORIENTATION
ORIENTATION: RIGHT;LEFT

## 2023-05-14 ASSESSMENT — PAIN DESCRIPTION - DESCRIPTORS
DESCRIPTORS: ACHING;DISCOMFORT;SORE;THROBBING
DESCRIPTORS: ACHING;DISCOMFORT;THROBBING
DESCRIPTORS: ACHING;DISCOMFORT;SORE;THROBBING
DESCRIPTORS: ACHING;DISCOMFORT;SORE;THROBBING
DESCRIPTORS: ACHING;SORE;THROBBING;DISCOMFORT
DESCRIPTORS: ACHING;DISCOMFORT;SORE;THROBBING
DESCRIPTORS: ACHING;SHOOTING;THROBBING;STABBING
DESCRIPTORS: ACHING;DISCOMFORT;SORE;THROBBING
DESCRIPTORS: ACHING;DISCOMFORT;SORE;THROBBING
DESCRIPTORS: ACHING;DISCOMFORT

## 2023-05-14 ASSESSMENT — PAIN DESCRIPTION - FREQUENCY: FREQUENCY: CONTINUOUS

## 2023-05-14 ASSESSMENT — PAIN - FUNCTIONAL ASSESSMENT

## 2023-05-14 ASSESSMENT — PAIN SCALES - GENERAL
PAINLEVEL_OUTOF10: 8
PAINLEVEL_OUTOF10: 8
PAINLEVEL_OUTOF10: 5
PAINLEVEL_OUTOF10: 10
PAINLEVEL_OUTOF10: 8
PAINLEVEL_OUTOF10: 9
PAINLEVEL_OUTOF10: 8
PAINLEVEL_OUTOF10: 9
PAINLEVEL_OUTOF10: 8
PAINLEVEL_OUTOF10: 7
PAINLEVEL_OUTOF10: 8
PAINLEVEL_OUTOF10: 9
PAINLEVEL_OUTOF10: 9
PAINLEVEL_OUTOF10: 8
PAINLEVEL_OUTOF10: 9
PAINLEVEL_OUTOF10: 8
PAINLEVEL_OUTOF10: 9
PAINLEVEL_OUTOF10: 8
PAINLEVEL_OUTOF10: 8
PAINLEVEL_OUTOF10: 7
PAINLEVEL_OUTOF10: 8

## 2023-05-14 ASSESSMENT — PAIN DESCRIPTION - LOCATION
LOCATION: GENERALIZED
LOCATION: GENERALIZED
LOCATION: PELVIS;BACK
LOCATION: BACK;PELVIS
LOCATION: PELVIS;BACK
LOCATION: RIB CAGE;BACK
LOCATION: GENERALIZED
LOCATION: PELVIS;BACK

## 2023-05-14 ASSESSMENT — PAIN DESCRIPTION - ONSET: ONSET: ON-GOING

## 2023-05-14 ASSESSMENT — PAIN DESCRIPTION - PAIN TYPE: TYPE: ACUTE PAIN

## 2023-05-15 PROCEDURE — 6370000000 HC RX 637 (ALT 250 FOR IP): Performed by: STUDENT IN AN ORGANIZED HEALTH CARE EDUCATION/TRAINING PROGRAM

## 2023-05-15 PROCEDURE — 6370000000 HC RX 637 (ALT 250 FOR IP)

## 2023-05-15 PROCEDURE — 6370000000 HC RX 637 (ALT 250 FOR IP): Performed by: SURGERY

## 2023-05-15 PROCEDURE — 6370000000 HC RX 637 (ALT 250 FOR IP): Performed by: CLINICAL NURSE SPECIALIST

## 2023-05-15 PROCEDURE — 6360000002 HC RX W HCPCS: Performed by: SURGERY

## 2023-05-15 PROCEDURE — 1200000000 HC SEMI PRIVATE

## 2023-05-15 PROCEDURE — 6360000002 HC RX W HCPCS

## 2023-05-15 PROCEDURE — 99232 SBSQ HOSP IP/OBS MODERATE 35: CPT | Performed by: SURGERY

## 2023-05-15 PROCEDURE — 2580000003 HC RX 258

## 2023-05-15 PROCEDURE — 97530 THERAPEUTIC ACTIVITIES: CPT

## 2023-05-15 PROCEDURE — 6360000002 HC RX W HCPCS: Performed by: STUDENT IN AN ORGANIZED HEALTH CARE EDUCATION/TRAINING PROGRAM

## 2023-05-15 PROCEDURE — 2580000003 HC RX 258: Performed by: STUDENT IN AN ORGANIZED HEALTH CARE EDUCATION/TRAINING PROGRAM

## 2023-05-15 PROCEDURE — 97535 SELF CARE MNGMENT TRAINING: CPT

## 2023-05-15 PROCEDURE — 2500000003 HC RX 250 WO HCPCS

## 2023-05-15 RX ORDER — HYDROMORPHONE HYDROCHLORIDE 1 MG/ML
0.5 INJECTION, SOLUTION INTRAMUSCULAR; INTRAVENOUS; SUBCUTANEOUS EVERY 4 HOURS PRN
Status: DISCONTINUED | OUTPATIENT
Start: 2023-05-15 | End: 2023-05-15

## 2023-05-15 RX ORDER — GABAPENTIN 400 MG/1
400 CAPSULE ORAL EVERY 8 HOURS
Status: DISCONTINUED | OUTPATIENT
Start: 2023-05-15 | End: 2023-05-18 | Stop reason: HOSPADM

## 2023-05-15 RX ORDER — MORPHINE SULFATE 10 MG/5ML
10 SOLUTION ORAL EVERY 4 HOURS PRN
Status: DISCONTINUED | OUTPATIENT
Start: 2023-05-15 | End: 2023-05-17

## 2023-05-15 RX ADMIN — GABAPENTIN 300 MG: 300 CAPSULE ORAL at 05:11

## 2023-05-15 RX ADMIN — KETOROLAC TROMETHAMINE 30 MG: 30 INJECTION, SOLUTION INTRAMUSCULAR; INTRAVENOUS at 16:43

## 2023-05-15 RX ADMIN — SODIUM CHLORIDE, PRESERVATIVE FREE 10 ML: 5 INJECTION INTRAVENOUS at 21:24

## 2023-05-15 RX ADMIN — HYDROMORPHONE HYDROCHLORIDE 0.5 MG: 1 INJECTION, SOLUTION INTRAMUSCULAR; INTRAVENOUS; SUBCUTANEOUS at 03:16

## 2023-05-15 RX ADMIN — BACITRACIN ZINC: 500 OINTMENT TOPICAL at 08:40

## 2023-05-15 RX ADMIN — NALOXEGOL OXALATE 25 MG: 25 TABLET, FILM COATED ORAL at 05:11

## 2023-05-15 RX ADMIN — ENOXAPARIN SODIUM 30 MG: 100 INJECTION SUBCUTANEOUS at 21:24

## 2023-05-15 RX ADMIN — MORPHINE SULFATE 10 MG: 10 SOLUTION ORAL at 18:58

## 2023-05-15 RX ADMIN — MORPHINE SULFATE 10 MG: 10 SOLUTION ORAL at 14:12

## 2023-05-15 RX ADMIN — METHOCARBAMOL 1500 MG: 750 TABLET ORAL at 21:23

## 2023-05-15 RX ADMIN — KETOROLAC TROMETHAMINE 30 MG: 30 INJECTION, SOLUTION INTRAMUSCULAR; INTRAVENOUS at 21:23

## 2023-05-15 RX ADMIN — OXYCODONE HYDROCHLORIDE 15 MG: 15 TABLET ORAL at 12:06

## 2023-05-15 RX ADMIN — MORPHINE SULFATE 10 MG: 10 SOLUTION ORAL at 23:19

## 2023-05-15 RX ADMIN — POLYETHYLENE GLYCOL 3350 17 G: 17 POWDER, FOR SOLUTION ORAL at 08:40

## 2023-05-15 RX ADMIN — METHOCARBAMOL 1500 MG: 750 TABLET ORAL at 08:40

## 2023-05-15 RX ADMIN — OXYCODONE HYDROCHLORIDE 15 MG: 15 TABLET ORAL at 16:43

## 2023-05-15 RX ADMIN — OXYCODONE HYDROCHLORIDE 15 MG: 15 TABLET ORAL at 21:22

## 2023-05-15 RX ADMIN — OXYCODONE HYDROCHLORIDE 15 MG: 15 TABLET ORAL at 05:42

## 2023-05-15 RX ADMIN — SODIUM CHLORIDE, PRESERVATIVE FREE 10 ML: 5 INJECTION INTRAVENOUS at 08:28

## 2023-05-15 RX ADMIN — POLYMYXIN B SULFATE, BACITRACIN ZINC, NEOMYCIN SULFATE: 5000; 3.5; 4 OINTMENT TOPICAL at 21:33

## 2023-05-15 RX ADMIN — BACITRACIN ZINC: 500 OINTMENT TOPICAL at 21:26

## 2023-05-15 RX ADMIN — MORPHINE SULFATE 10 MG: 10 SOLUTION ORAL at 08:48

## 2023-05-15 RX ADMIN — GABAPENTIN 400 MG: 400 CAPSULE ORAL at 13:30

## 2023-05-15 RX ADMIN — OXYCODONE HYDROCHLORIDE 15 MG: 15 TABLET ORAL at 01:21

## 2023-05-15 RX ADMIN — ONDANSETRON 4 MG: 2 INJECTION INTRAMUSCULAR; INTRAVENOUS at 14:12

## 2023-05-15 RX ADMIN — GABAPENTIN 400 MG: 400 CAPSULE ORAL at 21:23

## 2023-05-15 RX ADMIN — POLYMYXIN B SULFATE, BACITRACIN ZINC, NEOMYCIN SULFATE: 5000; 3.5; 4 OINTMENT TOPICAL at 11:00

## 2023-05-15 RX ADMIN — ACETAMINOPHEN 650 MG: 325 TABLET ORAL at 13:30

## 2023-05-15 RX ADMIN — METHOCARBAMOL 1500 MG: 750 TABLET ORAL at 13:30

## 2023-05-15 RX ADMIN — KETOROLAC TROMETHAMINE 30 MG: 30 INJECTION, SOLUTION INTRAMUSCULAR; INTRAVENOUS at 08:47

## 2023-05-15 RX ADMIN — SODIUM CHLORIDE, PRESERVATIVE FREE 10 ML: 5 INJECTION INTRAVENOUS at 21:32

## 2023-05-15 RX ADMIN — KETOROLAC TROMETHAMINE 30 MG: 30 INJECTION, SOLUTION INTRAMUSCULAR; INTRAVENOUS at 05:11

## 2023-05-15 RX ADMIN — ACETAMINOPHEN 650 MG: 325 TABLET ORAL at 03:16

## 2023-05-15 RX ADMIN — ACETAMINOPHEN 650 MG: 325 TABLET ORAL at 21:22

## 2023-05-15 RX ADMIN — ENOXAPARIN SODIUM 30 MG: 100 INJECTION SUBCUTANEOUS at 08:46

## 2023-05-15 RX ADMIN — ACETAMINOPHEN 650 MG: 325 TABLET ORAL at 08:39

## 2023-05-15 ASSESSMENT — PAIN SCALES - GENERAL
PAINLEVEL_OUTOF10: 7
PAINLEVEL_OUTOF10: 8
PAINLEVEL_OUTOF10: 8
PAINLEVEL_OUTOF10: 6
PAINLEVEL_OUTOF10: 8
PAINLEVEL_OUTOF10: 9
PAINLEVEL_OUTOF10: 6
PAINLEVEL_OUTOF10: 8
PAINLEVEL_OUTOF10: 7
PAINLEVEL_OUTOF10: 8

## 2023-05-15 ASSESSMENT — PAIN DESCRIPTION - DESCRIPTORS
DESCRIPTORS: ACHING;DISCOMFORT;POUNDING
DESCRIPTORS: THROBBING;STABBING;ACHING
DESCRIPTORS: ACHING;DISCOMFORT;POUNDING
DESCRIPTORS: ACHING;JABBING;SHOOTING
DESCRIPTORS: ACHING;DISCOMFORT;NAGGING

## 2023-05-15 ASSESSMENT — PAIN DESCRIPTION - LOCATION
LOCATION: GENERALIZED
LOCATION: BACK
LOCATION: GENERALIZED
LOCATION: BACK;PELVIS
LOCATION: GENERALIZED
LOCATION: BACK;HIP
LOCATION: PELVIS
LOCATION: BACK

## 2023-05-15 ASSESSMENT — PAIN SCALES - WONG BAKER
WONGBAKER_NUMERICALRESPONSE: 0;8
WONGBAKER_NUMERICALRESPONSE: 4

## 2023-05-15 ASSESSMENT — PAIN DESCRIPTION - ORIENTATION
ORIENTATION: MID;RIGHT;LEFT
ORIENTATION: RIGHT;LEFT

## 2023-05-15 ASSESSMENT — PAIN - FUNCTIONAL ASSESSMENT
PAIN_FUNCTIONAL_ASSESSMENT: PREVENTS OR INTERFERES SOME ACTIVE ACTIVITIES AND ADLS

## 2023-05-15 ASSESSMENT — PAIN DESCRIPTION - FREQUENCY
FREQUENCY: CONTINUOUS
FREQUENCY: CONTINUOUS

## 2023-05-15 ASSESSMENT — PAIN DESCRIPTION - PAIN TYPE: TYPE: ACUTE PAIN;SURGICAL PAIN

## 2023-05-15 ASSESSMENT — PAIN DESCRIPTION - ONSET: ONSET: ON-GOING

## 2023-05-15 NOTE — DISCHARGE INSTR - COC
Continuity of Care Form    Patient Name: Muna Ly   :  1985  MRN:  [id number]    Admit date:  2023  Discharge date:  23    Code Status Order: Full Code   Advance Directives:     Admitting Physician:  Ana Watson MD  PCP: No primary care provider on file. Discharging Nurse: [facility] Unit/Room#: 4502/4502-A  Discharging Unit Phone Number:     Emergency Contact:   Extended Emergency Contact Information  Primary Emergency Contact: Roosevelt Villafana  Mobile Phone: [phone]  Relation: Spouse  Preferred language: English   needed? No    Past Surgical History:  Past Surgical History:   Procedure Laterality Date    PELVIC FRACTURE SURGERY Right 2023    PELVIS EXTERNAL FIXATOR APPLICATION. RIGHT FEMUR IRRIGATION & DEBRIDEMENT,  EXTERNAL FIXATOR APPLICATION. performed by Vear Maria MD at [address] [facility] Right 2023    PELVIS OPEN REDUCTION INTERNAL FIXATION ANTERIOR, WITH IRRIGATION AND DEBRIDEMENT,  REMOVAL OF EX FIX PELVIS AND FEMUR ,OPEN REDUCTION INTERNAL FIXATION FEMUR performed by Vera Maria MD at [address]       Immunization History: There is no immunization history on file for this patient. Active Problems:  Patient Active Problem List   Diagnosis Code    Trauma T14.90XA    Open fracture of right femur, type IIIA, IIIB, or IIIC ([facility]) S72.91XC    Pelvic hematoma, male N50.1    Thoracic compression fracture ([address] S22.000A    Closed displaced fracture of anterior wall of right acetabulum ([address] S32.411A    Closed displaced fracture of pelvis ([address] S32. 9XXA    Closed fracture of multiple pubic rami, left, initial encounter ([address] S32.592A    Hemorrhagic shock ([facility]) R57.8    Acute pain R52    Acute blood loss anemia D62    Compression fracture of T4 vertebra ([facility]) S22.040A    Lactic acidemia E87.20    Acute respiratory failure with hypoxia ([facility]) J96.01    Contusion of bladder S37. [address] [facility]
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
## 2023-05-15 NOTE — CARE COORDINATION
Spoke with the patient at the bedside. Patient is agreeable to ARU. Patient lives with his wife in a ranch home with 3-4 steps to enter and 1 rail. Will need updated pt/ot to begin precert to ARU. Patient understands that his pain will need to be well controlled on oral pain medication before being admitted to ARU.

## 2023-05-16 LAB
BASOPHILS # BLD: 0.01 E9/L (ref 0–0.2)
BASOPHILS NFR BLD: 0.1 % (ref 0–2)
EOSINOPHIL # BLD: 0.09 E9/L (ref 0.05–0.5)
EOSINOPHIL NFR BLD: 1.1 % (ref 0–6)
ERYTHROCYTE [DISTWIDTH] IN BLOOD BY AUTOMATED COUNT: 12.9 FL (ref 11.5–15)
HCT VFR BLD AUTO: 25.4 % (ref 37–54)
HGB BLD-MCNC: 8.1 G/DL (ref 12.5–16.5)
IMM GRANULOCYTES # BLD: 0.16 E9/L
IMM GRANULOCYTES NFR BLD: 1.9 % (ref 0–5)
LYMPHOCYTES # BLD: 0.85 E9/L (ref 1.5–4)
LYMPHOCYTES NFR BLD: 10.2 % (ref 20–42)
MCH RBC QN AUTO: 29.7 PG (ref 26–35)
MCHC RBC AUTO-ENTMCNC: 31.9 % (ref 32–34.5)
MCV RBC AUTO: 93 FL (ref 80–99.9)
MONOCYTES # BLD: 0.42 E9/L (ref 0.1–0.95)
MONOCYTES NFR BLD: 5 % (ref 2–12)
NEUTROPHILS # BLD: 6.84 E9/L (ref 1.8–7.3)
NEUTS SEG NFR BLD: 81.7 % (ref 43–80)
PLATELET # BLD AUTO: 619 E9/L (ref 130–450)
PMV BLD AUTO: 9.2 FL (ref 7–12)
RBC # BLD AUTO: 2.73 E12/L (ref 3.8–5.8)
WBC # BLD: 8.4 E9/L (ref 4.5–11.5)

## 2023-05-16 PROCEDURE — 6370000000 HC RX 637 (ALT 250 FOR IP): Performed by: STUDENT IN AN ORGANIZED HEALTH CARE EDUCATION/TRAINING PROGRAM

## 2023-05-16 PROCEDURE — 2580000003 HC RX 258

## 2023-05-16 PROCEDURE — 6370000000 HC RX 637 (ALT 250 FOR IP)

## 2023-05-16 PROCEDURE — 99232 SBSQ HOSP IP/OBS MODERATE 35: CPT | Performed by: SURGERY

## 2023-05-16 PROCEDURE — 97530 THERAPEUTIC ACTIVITIES: CPT

## 2023-05-16 PROCEDURE — 2580000003 HC RX 258: Performed by: STUDENT IN AN ORGANIZED HEALTH CARE EDUCATION/TRAINING PROGRAM

## 2023-05-16 PROCEDURE — 6370000000 HC RX 637 (ALT 250 FOR IP): Performed by: SURGERY

## 2023-05-16 PROCEDURE — 6360000002 HC RX W HCPCS: Performed by: STUDENT IN AN ORGANIZED HEALTH CARE EDUCATION/TRAINING PROGRAM

## 2023-05-16 PROCEDURE — 97535 SELF CARE MNGMENT TRAINING: CPT

## 2023-05-16 PROCEDURE — 85025 COMPLETE CBC W/AUTO DIFF WBC: CPT

## 2023-05-16 PROCEDURE — 6360000002 HC RX W HCPCS: Performed by: SURGERY

## 2023-05-16 PROCEDURE — 6370000000 HC RX 637 (ALT 250 FOR IP): Performed by: CLINICAL NURSE SPECIALIST

## 2023-05-16 PROCEDURE — 36415 COLL VENOUS BLD VENIPUNCTURE: CPT

## 2023-05-16 PROCEDURE — 1200000000 HC SEMI PRIVATE

## 2023-05-16 RX ORDER — MORPHINE SULFATE 15 MG/1
15 TABLET, FILM COATED, EXTENDED RELEASE ORAL EVERY 12 HOURS SCHEDULED
Status: DISCONTINUED | OUTPATIENT
Start: 2023-05-16 | End: 2023-05-17

## 2023-05-16 RX ORDER — LACTULOSE 10 G/15ML
20 SOLUTION ORAL 3 TIMES DAILY
Status: DISCONTINUED | OUTPATIENT
Start: 2023-05-16 | End: 2023-05-18 | Stop reason: HOSPADM

## 2023-05-16 RX ADMIN — KETOROLAC TROMETHAMINE 30 MG: 30 INJECTION, SOLUTION INTRAMUSCULAR; INTRAVENOUS at 08:51

## 2023-05-16 RX ADMIN — MORPHINE SULFATE 15 MG: 15 TABLET, FILM COATED, EXTENDED RELEASE ORAL at 20:38

## 2023-05-16 RX ADMIN — ACETAMINOPHEN 650 MG: 325 TABLET ORAL at 10:12

## 2023-05-16 RX ADMIN — SODIUM CHLORIDE, PRESERVATIVE FREE 10 ML: 5 INJECTION INTRAVENOUS at 09:00

## 2023-05-16 RX ADMIN — LACTULOSE 20 G: 20 SOLUTION ORAL at 20:36

## 2023-05-16 RX ADMIN — LACTULOSE 20 G: 20 SOLUTION ORAL at 08:51

## 2023-05-16 RX ADMIN — OXYCODONE HYDROCHLORIDE 15 MG: 15 TABLET ORAL at 22:46

## 2023-05-16 RX ADMIN — ENOXAPARIN SODIUM 30 MG: 100 INJECTION SUBCUTANEOUS at 20:36

## 2023-05-16 RX ADMIN — METHOCARBAMOL 1500 MG: 750 TABLET ORAL at 20:36

## 2023-05-16 RX ADMIN — KETOROLAC TROMETHAMINE 30 MG: 30 INJECTION, SOLUTION INTRAMUSCULAR; INTRAVENOUS at 21:07

## 2023-05-16 RX ADMIN — MORPHINE SULFATE 15 MG: 15 TABLET, FILM COATED, EXTENDED RELEASE ORAL at 10:12

## 2023-05-16 RX ADMIN — GABAPENTIN 400 MG: 400 CAPSULE ORAL at 14:14

## 2023-05-16 RX ADMIN — OXYCODONE HYDROCHLORIDE 15 MG: 15 TABLET ORAL at 03:35

## 2023-05-16 RX ADMIN — METHOCARBAMOL 1500 MG: 750 TABLET ORAL at 14:14

## 2023-05-16 RX ADMIN — POLYMYXIN B SULFATE, BACITRACIN ZINC, NEOMYCIN SULFATE: 5000; 3.5; 4 OINTMENT TOPICAL at 20:46

## 2023-05-16 RX ADMIN — BACITRACIN ZINC: 500 OINTMENT TOPICAL at 08:59

## 2023-05-16 RX ADMIN — ACETAMINOPHEN 650 MG: 325 TABLET ORAL at 03:35

## 2023-05-16 RX ADMIN — GABAPENTIN 400 MG: 400 CAPSULE ORAL at 06:15

## 2023-05-16 RX ADMIN — POLYETHYLENE GLYCOL 3350 17 G: 17 POWDER, FOR SOLUTION ORAL at 08:51

## 2023-05-16 RX ADMIN — METHOCARBAMOL 1500 MG: 750 TABLET ORAL at 08:51

## 2023-05-16 RX ADMIN — BACITRACIN ZINC: 500 OINTMENT TOPICAL at 20:44

## 2023-05-16 RX ADMIN — KETOROLAC TROMETHAMINE 30 MG: 30 INJECTION, SOLUTION INTRAMUSCULAR; INTRAVENOUS at 03:35

## 2023-05-16 RX ADMIN — LACTULOSE 20 G: 20 SOLUTION ORAL at 14:14

## 2023-05-16 RX ADMIN — SODIUM CHLORIDE, PRESERVATIVE FREE 10 ML: 5 INJECTION INTRAVENOUS at 03:36

## 2023-05-16 RX ADMIN — OXYCODONE HYDROCHLORIDE 15 MG: 15 TABLET ORAL at 08:50

## 2023-05-16 RX ADMIN — ACETAMINOPHEN 650 MG: 325 TABLET ORAL at 16:44

## 2023-05-16 RX ADMIN — SODIUM CHLORIDE, PRESERVATIVE FREE 10 ML: 5 INJECTION INTRAVENOUS at 20:38

## 2023-05-16 RX ADMIN — ENOXAPARIN SODIUM 30 MG: 100 INJECTION SUBCUTANEOUS at 08:51

## 2023-05-16 RX ADMIN — OXYCODONE HYDROCHLORIDE 15 MG: 15 TABLET ORAL at 16:47

## 2023-05-16 RX ADMIN — KETOROLAC TROMETHAMINE 30 MG: 30 INJECTION, SOLUTION INTRAMUSCULAR; INTRAVENOUS at 15:21

## 2023-05-16 RX ADMIN — ACETAMINOPHEN 650 MG: 325 TABLET ORAL at 20:38

## 2023-05-16 RX ADMIN — GABAPENTIN 400 MG: 400 CAPSULE ORAL at 20:37

## 2023-05-16 RX ADMIN — MORPHINE SULFATE 10 MG: 10 SOLUTION ORAL at 05:01

## 2023-05-16 RX ADMIN — OXYCODONE HYDROCHLORIDE 15 MG: 15 TABLET ORAL at 12:59

## 2023-05-16 RX ADMIN — SODIUM CHLORIDE, PRESERVATIVE FREE 10 ML: 5 INJECTION INTRAVENOUS at 21:07

## 2023-05-16 RX ADMIN — NALOXEGOL OXALATE 25 MG: 25 TABLET, FILM COATED ORAL at 06:15

## 2023-05-16 ASSESSMENT — PAIN SCALES - GENERAL
PAINLEVEL_OUTOF10: 8
PAINLEVEL_OUTOF10: 4
PAINLEVEL_OUTOF10: 3
PAINLEVEL_OUTOF10: 8
PAINLEVEL_OUTOF10: 7
PAINLEVEL_OUTOF10: 8
PAINLEVEL_OUTOF10: 3
PAINLEVEL_OUTOF10: 8

## 2023-05-16 ASSESSMENT — PAIN DESCRIPTION - DESCRIPTORS
DESCRIPTORS: ACHING;DISCOMFORT;SORE
DESCRIPTORS: SHARP;SHOOTING;STABBING;THROBBING
DESCRIPTORS: ACHING;DISCOMFORT;SORE
DESCRIPTORS: ACHING;THROBBING
DESCRIPTORS: ACHING;SHOOTING;DISCOMFORT

## 2023-05-16 ASSESSMENT — PAIN DESCRIPTION - LOCATION
LOCATION: PELVIS
LOCATION: HIP
LOCATION: LEG;PELVIS
LOCATION: BACK
LOCATION: PELVIS
LOCATION: HIP

## 2023-05-16 ASSESSMENT — PAIN DESCRIPTION - ORIENTATION
ORIENTATION: RIGHT;LEFT
ORIENTATION: RIGHT;LEFT
ORIENTATION: RIGHT
ORIENTATION: LEFT;RIGHT
ORIENTATION: RIGHT;LEFT
ORIENTATION: RIGHT;LEFT

## 2023-05-16 NOTE — PLAN OF CARE
PT HAS BEEN INCONTINENT OF URINE STATES HE CANNOT TELL WHEN HE NEEDS TO VOID. FAMILY CONCERNED. MESSAGED SROC.

## 2023-05-16 NOTE — PLAN OF CARE
Problem: Pain  Goal: Verbalizes/displays adequate comfort level or baseline comfort level  Outcome: Not Progressing     Problem: Discharge Planning  Goal: Discharge to home or other facility with appropriate resources  Outcome: Progressing     Problem: Skin/Tissue Integrity  Goal: Absence of new skin breakdown  Description: 1. Monitor for areas of redness and/or skin breakdown  2. Assess vascular access sites hourly  3. Every 4-6 hours minimum:  Change oxygen saturation probe site  4. Every 4-6 hours:  If on nasal continuous positive airway pressure, respiratory therapy assess nares and determine need for appliance change or resting period.   Outcome: Progressing     Problem: Pain  Goal: Verbalizes/displays adequate comfort level or baseline comfort level  Outcome: Not Progressing

## 2023-05-16 NOTE — CARE COORDINATION
5/16/2023social work transition of care planning  Pt plan is to acute rehab,precert pending.   Electronically signed by RC Borden on 5/16/2023 at 7:20 AM

## 2023-05-17 PROCEDURE — 6370000000 HC RX 637 (ALT 250 FOR IP)

## 2023-05-17 PROCEDURE — 6370000000 HC RX 637 (ALT 250 FOR IP): Performed by: SURGERY

## 2023-05-17 PROCEDURE — 1200000000 HC SEMI PRIVATE

## 2023-05-17 PROCEDURE — 2580000003 HC RX 258: Performed by: STUDENT IN AN ORGANIZED HEALTH CARE EDUCATION/TRAINING PROGRAM

## 2023-05-17 PROCEDURE — 6370000000 HC RX 637 (ALT 250 FOR IP): Performed by: STUDENT IN AN ORGANIZED HEALTH CARE EDUCATION/TRAINING PROGRAM

## 2023-05-17 PROCEDURE — 2580000003 HC RX 258

## 2023-05-17 PROCEDURE — 97535 SELF CARE MNGMENT TRAINING: CPT

## 2023-05-17 PROCEDURE — 6370000000 HC RX 637 (ALT 250 FOR IP): Performed by: CLINICAL NURSE SPECIALIST

## 2023-05-17 PROCEDURE — 97530 THERAPEUTIC ACTIVITIES: CPT

## 2023-05-17 PROCEDURE — 6360000002 HC RX W HCPCS: Performed by: SURGERY

## 2023-05-17 PROCEDURE — 51798 US URINE CAPACITY MEASURE: CPT

## 2023-05-17 PROCEDURE — 6360000002 HC RX W HCPCS: Performed by: STUDENT IN AN ORGANIZED HEALTH CARE EDUCATION/TRAINING PROGRAM

## 2023-05-17 PROCEDURE — 99232 SBSQ HOSP IP/OBS MODERATE 35: CPT | Performed by: SURGERY

## 2023-05-17 RX ORDER — MORPHINE SULFATE 15 MG/1
30 TABLET, FILM COATED, EXTENDED RELEASE ORAL EVERY 12 HOURS SCHEDULED
Status: DISCONTINUED | OUTPATIENT
Start: 2023-05-17 | End: 2023-05-18 | Stop reason: HOSPADM

## 2023-05-17 RX ORDER — BISACODYL 10 MG
10 SUPPOSITORY, RECTAL RECTAL ONCE
Status: DISCONTINUED | OUTPATIENT
Start: 2023-05-17 | End: 2023-05-17

## 2023-05-17 RX ORDER — BISACODYL 10 MG
10 SUPPOSITORY, RECTAL RECTAL DAILY
Status: DISCONTINUED | OUTPATIENT
Start: 2023-05-17 | End: 2023-05-18 | Stop reason: HOSPADM

## 2023-05-17 RX ADMIN — ACETAMINOPHEN 650 MG: 325 TABLET ORAL at 16:05

## 2023-05-17 RX ADMIN — ENOXAPARIN SODIUM 30 MG: 100 INJECTION SUBCUTANEOUS at 20:46

## 2023-05-17 RX ADMIN — LACTULOSE 20 G: 20 SOLUTION ORAL at 14:22

## 2023-05-17 RX ADMIN — MORPHINE SULFATE 30 MG: 15 TABLET, FILM COATED, EXTENDED RELEASE ORAL at 20:48

## 2023-05-17 RX ADMIN — SODIUM CHLORIDE, PRESERVATIVE FREE 10 ML: 5 INJECTION INTRAVENOUS at 20:49

## 2023-05-17 RX ADMIN — SODIUM CHLORIDE, PRESERVATIVE FREE 10 ML: 5 INJECTION INTRAVENOUS at 20:50

## 2023-05-17 RX ADMIN — METHOCARBAMOL 1500 MG: 750 TABLET ORAL at 20:47

## 2023-05-17 RX ADMIN — POLYETHYLENE GLYCOL 3350 17 G: 17 POWDER, FOR SOLUTION ORAL at 09:37

## 2023-05-17 RX ADMIN — GABAPENTIN 400 MG: 400 CAPSULE ORAL at 20:52

## 2023-05-17 RX ADMIN — BACITRACIN ZINC: 500 OINTMENT TOPICAL at 20:45

## 2023-05-17 RX ADMIN — SODIUM CHLORIDE, PRESERVATIVE FREE 10 ML: 5 INJECTION INTRAVENOUS at 09:37

## 2023-05-17 RX ADMIN — MAGNESIUM HYDROXIDE 30 ML: 400 SUSPENSION ORAL at 09:37

## 2023-05-17 RX ADMIN — OXYCODONE HYDROCHLORIDE 15 MG: 15 TABLET ORAL at 06:46

## 2023-05-17 RX ADMIN — LACTULOSE 20 G: 20 SOLUTION ORAL at 09:36

## 2023-05-17 RX ADMIN — METHOCARBAMOL 1500 MG: 750 TABLET ORAL at 09:36

## 2023-05-17 RX ADMIN — OXYCODONE HYDROCHLORIDE 15 MG: 15 TABLET ORAL at 18:24

## 2023-05-17 RX ADMIN — OXYCODONE HYDROCHLORIDE 15 MG: 15 TABLET ORAL at 10:32

## 2023-05-17 RX ADMIN — ENOXAPARIN SODIUM 30 MG: 100 INJECTION SUBCUTANEOUS at 09:37

## 2023-05-17 RX ADMIN — LACTULOSE 20 G: 20 SOLUTION ORAL at 20:46

## 2023-05-17 RX ADMIN — SODIUM CHLORIDE, PRESERVATIVE FREE 10 ML: 5 INJECTION INTRAVENOUS at 02:48

## 2023-05-17 RX ADMIN — METHOCARBAMOL 1500 MG: 750 TABLET ORAL at 14:20

## 2023-05-17 RX ADMIN — OXYCODONE HYDROCHLORIDE 15 MG: 15 TABLET ORAL at 02:55

## 2023-05-17 RX ADMIN — OXYCODONE HYDROCHLORIDE 15 MG: 15 TABLET ORAL at 14:20

## 2023-05-17 RX ADMIN — MORPHINE SULFATE 15 MG: 15 TABLET, FILM COATED, EXTENDED RELEASE ORAL at 09:36

## 2023-05-17 RX ADMIN — KETOROLAC TROMETHAMINE 30 MG: 30 INJECTION, SOLUTION INTRAMUSCULAR; INTRAVENOUS at 09:36

## 2023-05-17 RX ADMIN — ACETAMINOPHEN 650 MG: 325 TABLET ORAL at 09:36

## 2023-05-17 RX ADMIN — GABAPENTIN 400 MG: 400 CAPSULE ORAL at 06:46

## 2023-05-17 RX ADMIN — BISACODYL 10 MG: 10 SUPPOSITORY RECTAL at 18:23

## 2023-05-17 RX ADMIN — ACETAMINOPHEN 650 MG: 325 TABLET ORAL at 02:55

## 2023-05-17 RX ADMIN — KETOROLAC TROMETHAMINE 30 MG: 30 INJECTION, SOLUTION INTRAMUSCULAR; INTRAVENOUS at 02:47

## 2023-05-17 RX ADMIN — POLYMYXIN B SULFATE, BACITRACIN ZINC, NEOMYCIN SULFATE: 5000; 3.5; 4 OINTMENT TOPICAL at 09:45

## 2023-05-17 RX ADMIN — NALOXEGOL OXALATE 25 MG: 25 TABLET, FILM COATED ORAL at 06:46

## 2023-05-17 RX ADMIN — ACETAMINOPHEN 650 MG: 325 TABLET ORAL at 20:44

## 2023-05-17 RX ADMIN — GABAPENTIN 400 MG: 400 CAPSULE ORAL at 14:21

## 2023-05-17 RX ADMIN — OXYCODONE HYDROCHLORIDE 15 MG: 15 TABLET ORAL at 22:28

## 2023-05-17 ASSESSMENT — PAIN DESCRIPTION - LOCATION
LOCATION: PELVIS
LOCATION: HIP;LEG
LOCATION: HIP;LEG
LOCATION: BACK;OTHER (COMMENT)
LOCATION: HIP;FOOT
LOCATION: OTHER (COMMENT)
LOCATION: HIP
LOCATION: HIP;BUTTOCKS;LEG

## 2023-05-17 ASSESSMENT — PAIN SCALES - WONG BAKER
WONGBAKER_NUMERICALRESPONSE: 2
WONGBAKER_NUMERICALRESPONSE: 4
WONGBAKER_NUMERICALRESPONSE: 2
WONGBAKER_NUMERICALRESPONSE: 2

## 2023-05-17 ASSESSMENT — PAIN DESCRIPTION - DESCRIPTORS
DESCRIPTORS: ACHING;BURNING;PENETRATING
DESCRIPTORS: STABBING;THROBBING;SHOOTING
DESCRIPTORS: ACHING;CRUSHING;DISCOMFORT
DESCRIPTORS: DISCOMFORT;PRESSURE;SHARP
DESCRIPTORS: ACHING;BURNING;SQUEEZING
DESCRIPTORS: DISCOMFORT;PRESSURE;OTHER (COMMENT)
DESCRIPTORS: ACHING;POUNDING;SHOOTING
DESCRIPTORS: ACHING;BURNING;PENETRATING

## 2023-05-17 ASSESSMENT — PAIN SCALES - GENERAL
PAINLEVEL_OUTOF10: 8
PAINLEVEL_OUTOF10: 10
PAINLEVEL_OUTOF10: 10
PAINLEVEL_OUTOF10: 8
PAINLEVEL_OUTOF10: 9
PAINLEVEL_OUTOF10: 7
PAINLEVEL_OUTOF10: 4
PAINLEVEL_OUTOF10: 10
PAINLEVEL_OUTOF10: 8
PAINLEVEL_OUTOF10: 9
PAINLEVEL_OUTOF10: 9

## 2023-05-17 ASSESSMENT — PAIN DESCRIPTION - PAIN TYPE
TYPE: ACUTE PAIN

## 2023-05-17 ASSESSMENT — PAIN DESCRIPTION - ORIENTATION
ORIENTATION: RIGHT;LEFT

## 2023-05-17 ASSESSMENT — PAIN - FUNCTIONAL ASSESSMENT
PAIN_FUNCTIONAL_ASSESSMENT: PREVENTS OR INTERFERES WITH ALL ACTIVE AND SOME PASSIVE ACTIVITIES
PAIN_FUNCTIONAL_ASSESSMENT: PREVENTS OR INTERFERES SOME ACTIVE ACTIVITIES AND ADLS
PAIN_FUNCTIONAL_ASSESSMENT: PREVENTS OR INTERFERES SOME ACTIVE ACTIVITIES AND ADLS

## 2023-05-17 ASSESSMENT — PAIN DESCRIPTION - FREQUENCY
FREQUENCY: CONTINUOUS

## 2023-05-17 ASSESSMENT — PAIN DESCRIPTION - ONSET
ONSET: ON-GOING

## 2023-05-17 NOTE — PLAN OF CARE
Problem: Pain  Goal: Verbalizes/displays adequate comfort level or baseline comfort level  5/16/2023 2255 by De Chinchilla RN  Outcome: Progressing     Problem: Skin/Tissue Integrity  Goal: Absence of new skin breakdown  Description: 1. Monitor for areas of redness and/or skin breakdown  2. Assess vascular access sites hourly  3. Every 4-6 hours minimum:  Change oxygen saturation probe site  4. Every 4-6 hours:  If on nasal continuous positive airway pressure, respiratory therapy assess nares and determine need for appliance change or resting period.   5/16/2023 2255 by Parminder Davidson RN  Outcome: Progressing     Problem: Safety - Adult  Goal: Free from fall injury  5/16/2023 2255 by Parminder Davidson RN  Outcome: Progressing     Problem: ABCDS Injury Assessment  Goal: Absence of physical injury  5/16/2023 2255 by Parminder Davidson RN  Outcome: Progressing     Problem: Musculoskeletal - Adult  Goal: Return mobility to safest level of function  5/16/2023 2255 by Parminder Davidson RN  Outcome: Progressing     Problem: Musculoskeletal - Adult  Goal: Maintain proper alignment of affected body part  5/16/2023 2255 by Parminder Davidson RN  Outcome: Progressing

## 2023-05-17 NOTE — CARE COORDINATION
Patient approved for acute rehab per payer source. Authorization is good through 5/23. Will admit when pain under better control and patient able to tolerate intensity of acute rehab.

## 2023-05-17 NOTE — CARE COORDINATION
Care Coordination  Have received the precert for the patient to go to actue rehab here in house. Per acute rehab authorization is good through 5/23. Plan to admit him once he does better in therapy and his pain is controlled.
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
## 2023-05-18 ENCOUNTER — HOSPITAL ENCOUNTER (INPATIENT)
Age: 38
LOS: 29 days | Discharge: HOME HEALTH | DRG: 516 | End: 2023-06-16
Attending: PHYSICAL MEDICINE & REHABILITATION | Admitting: PHYSICAL MEDICINE & REHABILITATION
Payer: COMMERCIAL

## 2023-05-18 VITALS
OXYGEN SATURATION: 97 % | TEMPERATURE: 98.9 F | WEIGHT: 211.86 LBS | HEIGHT: 72 IN | HEART RATE: 86 BPM | DIASTOLIC BLOOD PRESSURE: 89 MMHG | RESPIRATION RATE: 18 BRPM | BODY MASS INDEX: 28.7 KG/M2 | SYSTOLIC BLOOD PRESSURE: 136 MMHG

## 2023-05-18 DIAGNOSIS — S72.91XC: ICD-10-CM

## 2023-05-18 DIAGNOSIS — M79.2 NEUROPATHIC PAIN OF LEFT LOWER EXTREMITY: ICD-10-CM

## 2023-05-18 DIAGNOSIS — T07.XXXA MULTIPLE TRAUMA: Primary | ICD-10-CM

## 2023-05-18 DIAGNOSIS — S32.9XXA CLOSED DISPLACED FRACTURE OF PELVIS, UNSPECIFIED PART OF PELVIS, INITIAL ENCOUNTER (HCC): ICD-10-CM

## 2023-05-18 PROCEDURE — 6370000000 HC RX 637 (ALT 250 FOR IP): Performed by: STUDENT IN AN ORGANIZED HEALTH CARE EDUCATION/TRAINING PROGRAM

## 2023-05-18 PROCEDURE — 6370000000 HC RX 637 (ALT 250 FOR IP): Performed by: CLINICAL NURSE SPECIALIST

## 2023-05-18 PROCEDURE — 97535 SELF CARE MNGMENT TRAINING: CPT

## 2023-05-18 PROCEDURE — 6370000000 HC RX 637 (ALT 250 FOR IP)

## 2023-05-18 PROCEDURE — 6360000002 HC RX W HCPCS: Performed by: STUDENT IN AN ORGANIZED HEALTH CARE EDUCATION/TRAINING PROGRAM

## 2023-05-18 PROCEDURE — 6370000000 HC RX 637 (ALT 250 FOR IP): Performed by: SURGERY

## 2023-05-18 PROCEDURE — 6360000002 HC RX W HCPCS: Performed by: CLINICAL NURSE SPECIALIST

## 2023-05-18 PROCEDURE — 99223 1ST HOSP IP/OBS HIGH 75: CPT | Performed by: PHYSICAL MEDICINE & REHABILITATION

## 2023-05-18 PROCEDURE — 51798 US URINE CAPACITY MEASURE: CPT

## 2023-05-18 PROCEDURE — 2580000003 HC RX 258

## 2023-05-18 PROCEDURE — 99232 SBSQ HOSP IP/OBS MODERATE 35: CPT | Performed by: SURGERY

## 2023-05-18 PROCEDURE — 1280000000 HC REHAB R&B

## 2023-05-18 PROCEDURE — 97530 THERAPEUTIC ACTIVITIES: CPT

## 2023-05-18 RX ORDER — ONDANSETRON 2 MG/ML
4 INJECTION INTRAMUSCULAR; INTRAVENOUS EVERY 6 HOURS PRN
Status: CANCELLED | OUTPATIENT
Start: 2023-05-18

## 2023-05-18 RX ORDER — LACTULOSE 10 G/15ML
20 SOLUTION ORAL 3 TIMES DAILY
Status: DISCONTINUED | OUTPATIENT
Start: 2023-05-18 | End: 2023-05-22

## 2023-05-18 RX ORDER — BACITRACIN, NEOMYCIN, POLYMYXIN B 400; 3.5; 5 [USP'U]/G; MG/G; [USP'U]/G
OINTMENT TOPICAL 2 TIMES DAILY
Status: CANCELLED | OUTPATIENT
Start: 2023-05-18

## 2023-05-18 RX ORDER — BISACODYL 10 MG
10 SUPPOSITORY, RECTAL RECTAL DAILY
Status: DISCONTINUED | OUTPATIENT
Start: 2023-05-19 | End: 2023-05-22

## 2023-05-18 RX ORDER — ONDANSETRON 4 MG/1
4 TABLET, ORALLY DISINTEGRATING ORAL EVERY 8 HOURS PRN
Status: DISCONTINUED | OUTPATIENT
Start: 2023-05-18 | End: 2023-06-16 | Stop reason: HOSPADM

## 2023-05-18 RX ORDER — OXYCODONE HYDROCHLORIDE 15 MG/1
15 TABLET ORAL EVERY 4 HOURS PRN
Status: CANCELLED | OUTPATIENT
Start: 2023-05-18

## 2023-05-18 RX ORDER — SODIUM CHLORIDE 0.9 % (FLUSH) 0.9 %
10 SYRINGE (ML) INJECTION PRN
Status: DISCONTINUED | OUTPATIENT
Start: 2023-05-18 | End: 2023-06-16 | Stop reason: HOSPADM

## 2023-05-18 RX ORDER — POLYETHYLENE GLYCOL 3350 17 G/17G
17 POWDER, FOR SOLUTION ORAL DAILY PRN
Status: DISCONTINUED | OUTPATIENT
Start: 2023-05-18 | End: 2023-06-16 | Stop reason: HOSPADM

## 2023-05-18 RX ORDER — SODIUM CHLORIDE 9 MG/ML
INJECTION, SOLUTION INTRAVENOUS PRN
Status: DISCONTINUED | OUTPATIENT
Start: 2023-05-18 | End: 2023-06-16 | Stop reason: HOSPADM

## 2023-05-18 RX ORDER — GABAPENTIN 400 MG/1
400 CAPSULE ORAL EVERY 8 HOURS
Status: DISCONTINUED | OUTPATIENT
Start: 2023-05-18 | End: 2023-05-30

## 2023-05-18 RX ORDER — SODIUM CHLORIDE 0.9 % (FLUSH) 0.9 %
5-40 SYRINGE (ML) INJECTION EVERY 12 HOURS SCHEDULED
Status: DISCONTINUED | OUTPATIENT
Start: 2023-05-18 | End: 2023-05-28

## 2023-05-18 RX ORDER — METHOCARBAMOL 750 MG/1
1500 TABLET, FILM COATED ORAL 3 TIMES DAILY
Status: DISCONTINUED | OUTPATIENT
Start: 2023-05-18 | End: 2023-06-12

## 2023-05-18 RX ORDER — SODIUM CHLORIDE 0.9 % (FLUSH) 0.9 %
10 SYRINGE (ML) INJECTION EVERY 12 HOURS SCHEDULED
Status: CANCELLED | OUTPATIENT
Start: 2023-05-18

## 2023-05-18 RX ORDER — SODIUM CHLORIDE 0.9 % (FLUSH) 0.9 %
5-40 SYRINGE (ML) INJECTION PRN
Status: DISCONTINUED | OUTPATIENT
Start: 2023-05-18 | End: 2023-06-16 | Stop reason: HOSPADM

## 2023-05-18 RX ORDER — SODIUM CHLORIDE 0.9 % (FLUSH) 0.9 %
5-40 SYRINGE (ML) INJECTION PRN
Status: CANCELLED | OUTPATIENT
Start: 2023-05-18

## 2023-05-18 RX ORDER — ONDANSETRON 2 MG/ML
4 INJECTION INTRAMUSCULAR; INTRAVENOUS EVERY 6 HOURS PRN
Status: DISCONTINUED | OUTPATIENT
Start: 2023-05-18 | End: 2023-05-18

## 2023-05-18 RX ORDER — ONDANSETRON 4 MG/1
4 TABLET, ORALLY DISINTEGRATING ORAL EVERY 8 HOURS PRN
Status: CANCELLED | OUTPATIENT
Start: 2023-05-18

## 2023-05-18 RX ORDER — POLYETHYLENE GLYCOL 3350 17 G/17G
17 POWDER, FOR SOLUTION ORAL DAILY
Status: DISCONTINUED | OUTPATIENT
Start: 2023-05-19 | End: 2023-05-31

## 2023-05-18 RX ORDER — SODIUM CHLORIDE 9 MG/ML
INJECTION, SOLUTION INTRAVENOUS PRN
Status: CANCELLED | OUTPATIENT
Start: 2023-05-18

## 2023-05-18 RX ORDER — ACETAMINOPHEN 325 MG/1
650 TABLET ORAL EVERY 6 HOURS
Status: CANCELLED | OUTPATIENT
Start: 2023-05-18

## 2023-05-18 RX ORDER — LACTULOSE 10 G/15ML
20 SOLUTION ORAL 3 TIMES DAILY
Status: CANCELLED | OUTPATIENT
Start: 2023-05-18

## 2023-05-18 RX ORDER — SODIUM CHLORIDE 0.9 % (FLUSH) 0.9 %
10 SYRINGE (ML) INJECTION EVERY 12 HOURS SCHEDULED
Status: DISCONTINUED | OUTPATIENT
Start: 2023-05-18 | End: 2023-05-28

## 2023-05-18 RX ORDER — BISACODYL 10 MG
10 SUPPOSITORY, RECTAL RECTAL DAILY
Status: CANCELLED | OUTPATIENT
Start: 2023-05-19

## 2023-05-18 RX ORDER — OXYCODONE HYDROCHLORIDE 5 MG/1
10 TABLET ORAL EVERY 4 HOURS PRN
Status: DISCONTINUED | OUTPATIENT
Start: 2023-05-18 | End: 2023-06-07

## 2023-05-18 RX ORDER — SODIUM CHLORIDE 0.9 % (FLUSH) 0.9 %
5-40 SYRINGE (ML) INJECTION EVERY 12 HOURS SCHEDULED
Status: CANCELLED | OUTPATIENT
Start: 2023-05-18

## 2023-05-18 RX ORDER — BACITRACIN ZINC 500 [USP'U]/G
OINTMENT TOPICAL 3 TIMES DAILY
Status: DISCONTINUED | OUTPATIENT
Start: 2023-05-18 | End: 2023-06-16 | Stop reason: HOSPADM

## 2023-05-18 RX ORDER — ENOXAPARIN SODIUM 100 MG/ML
30 INJECTION SUBCUTANEOUS 2 TIMES DAILY
Status: DISCONTINUED | OUTPATIENT
Start: 2023-05-18 | End: 2023-06-16 | Stop reason: HOSPADM

## 2023-05-18 RX ORDER — MORPHINE SULFATE 15 MG/1
30 TABLET, FILM COATED, EXTENDED RELEASE ORAL EVERY 12 HOURS SCHEDULED
Status: COMPLETED | OUTPATIENT
Start: 2023-05-18 | End: 2023-05-22

## 2023-05-18 RX ORDER — BACITRACIN ZINC 500 [USP'U]/G
OINTMENT TOPICAL 3 TIMES DAILY
Status: CANCELLED | OUTPATIENT
Start: 2023-05-18

## 2023-05-18 RX ORDER — OXYCODONE HYDROCHLORIDE 15 MG/1
15 TABLET ORAL EVERY 4 HOURS PRN
Status: DISCONTINUED | OUTPATIENT
Start: 2023-05-18 | End: 2023-06-07

## 2023-05-18 RX ORDER — GABAPENTIN 400 MG/1
400 CAPSULE ORAL EVERY 8 HOURS
Status: CANCELLED | OUTPATIENT
Start: 2023-05-18

## 2023-05-18 RX ORDER — ACETAMINOPHEN 325 MG/1
650 TABLET ORAL EVERY 6 HOURS
Status: DISCONTINUED | OUTPATIENT
Start: 2023-05-18 | End: 2023-06-16 | Stop reason: HOSPADM

## 2023-05-18 RX ORDER — SODIUM CHLORIDE 0.9 % (FLUSH) 0.9 %
10 SYRINGE (ML) INJECTION PRN
Status: CANCELLED | OUTPATIENT
Start: 2023-05-18

## 2023-05-18 RX ORDER — OXYCODONE HYDROCHLORIDE 10 MG/1
10 TABLET ORAL EVERY 4 HOURS PRN
Status: CANCELLED | OUTPATIENT
Start: 2023-05-18

## 2023-05-18 RX ORDER — POLYETHYLENE GLYCOL 3350 17 G/17G
17 POWDER, FOR SOLUTION ORAL DAILY
Status: CANCELLED | OUTPATIENT
Start: 2023-05-19

## 2023-05-18 RX ORDER — METHOCARBAMOL 750 MG/1
1500 TABLET, FILM COATED ORAL 3 TIMES DAILY
Status: CANCELLED | OUTPATIENT
Start: 2023-05-18

## 2023-05-18 RX ORDER — MORPHINE SULFATE 15 MG/1
30 TABLET, FILM COATED, EXTENDED RELEASE ORAL EVERY 12 HOURS SCHEDULED
Status: CANCELLED | OUTPATIENT
Start: 2023-05-18 | End: 2023-05-23

## 2023-05-18 RX ORDER — ENOXAPARIN SODIUM 100 MG/ML
30 INJECTION SUBCUTANEOUS 2 TIMES DAILY
Status: CANCELLED | OUTPATIENT
Start: 2023-05-18

## 2023-05-18 RX ADMIN — ACETAMINOPHEN 650 MG: 325 TABLET ORAL at 19:26

## 2023-05-18 RX ADMIN — POLYETHYLENE GLYCOL 3350 17 G: 17 POWDER, FOR SOLUTION ORAL at 09:22

## 2023-05-18 RX ADMIN — OXYCODONE HYDROCHLORIDE 15 MG: 15 TABLET ORAL at 23:36

## 2023-05-18 RX ADMIN — BACITRACIN ZINC, NEOMYCIN SULFATE, POLYMYXIN B SULFATE: 3.5; 5000; 4 OINTMENT TOPICAL at 21:37

## 2023-05-18 RX ADMIN — ACETAMINOPHEN 650 MG: 325 TABLET ORAL at 14:06

## 2023-05-18 RX ADMIN — GABAPENTIN 400 MG: 400 CAPSULE ORAL at 14:06

## 2023-05-18 RX ADMIN — BACITRACIN ZINC: 500 OINTMENT TOPICAL at 09:14

## 2023-05-18 RX ADMIN — METHOCARBAMOL 1500 MG: 750 TABLET ORAL at 09:13

## 2023-05-18 RX ADMIN — ACETAMINOPHEN 650 MG: 325 TABLET ORAL at 06:05

## 2023-05-18 RX ADMIN — OXYCODONE HYDROCHLORIDE 15 MG: 15 TABLET ORAL at 11:03

## 2023-05-18 RX ADMIN — OXYCODONE HYDROCHLORIDE 15 MG: 15 TABLET ORAL at 06:06

## 2023-05-18 RX ADMIN — GABAPENTIN 400 MG: 400 CAPSULE ORAL at 21:37

## 2023-05-18 RX ADMIN — OXYCODONE HYDROCHLORIDE 15 MG: 15 TABLET ORAL at 15:17

## 2023-05-18 RX ADMIN — MORPHINE SULFATE 30 MG: 15 TABLET, FILM COATED, EXTENDED RELEASE ORAL at 21:37

## 2023-05-18 RX ADMIN — POLYMYXIN B SULFATE, BACITRACIN ZINC, NEOMYCIN SULFATE: 5000; 3.5; 4 OINTMENT TOPICAL at 09:23

## 2023-05-18 RX ADMIN — OXYCODONE HYDROCHLORIDE 15 MG: 15 TABLET ORAL at 02:58

## 2023-05-18 RX ADMIN — METHOCARBAMOL 1500 MG: 750 TABLET ORAL at 14:06

## 2023-05-18 RX ADMIN — NALOXEGOL OXALATE 25 MG: 25 TABLET, FILM COATED ORAL at 06:10

## 2023-05-18 RX ADMIN — METHOCARBAMOL 1500 MG: 750 TABLET ORAL at 21:37

## 2023-05-18 RX ADMIN — GABAPENTIN 400 MG: 400 CAPSULE ORAL at 06:06

## 2023-05-18 RX ADMIN — OXYCODONE HYDROCHLORIDE 15 MG: 15 TABLET ORAL at 19:26

## 2023-05-18 RX ADMIN — ENOXAPARIN SODIUM 30 MG: 100 INJECTION SUBCUTANEOUS at 09:13

## 2023-05-18 RX ADMIN — MORPHINE SULFATE 30 MG: 15 TABLET, FILM COATED, EXTENDED RELEASE ORAL at 09:13

## 2023-05-18 RX ADMIN — ACETAMINOPHEN 650 MG: 325 TABLET ORAL at 02:57

## 2023-05-18 RX ADMIN — SODIUM CHLORIDE, PRESERVATIVE FREE 10 ML: 5 INJECTION INTRAVENOUS at 09:14

## 2023-05-18 RX ADMIN — BACITRACIN ZINC: 500 OINTMENT TOPICAL at 21:36

## 2023-05-18 RX ADMIN — ENOXAPARIN SODIUM 30 MG: 100 INJECTION SUBCUTANEOUS at 21:37

## 2023-05-18 ASSESSMENT — PAIN DESCRIPTION - ORIENTATION
ORIENTATION: POSTERIOR;LEFT
ORIENTATION: RIGHT;LEFT
ORIENTATION: RIGHT;LEFT

## 2023-05-18 ASSESSMENT — PAIN DESCRIPTION - DESCRIPTORS
DESCRIPTORS: ACHING;DISCOMFORT;POUNDING
DESCRIPTORS: ACHING;BURNING;GNAWING
DESCRIPTORS: ACHING;CRUSHING;PENETRATING
DESCRIPTORS: ACHING;DULL;DISCOMFORT
DESCRIPTORS: ACHING;DISCOMFORT
DESCRIPTORS: ACHING;DISCOMFORT;POUNDING

## 2023-05-18 ASSESSMENT — PAIN SCALES - GENERAL
PAINLEVEL_OUTOF10: 7
PAINLEVEL_OUTOF10: 7
PAINLEVEL_OUTOF10: 9
PAINLEVEL_OUTOF10: 9
PAINLEVEL_OUTOF10: 7
PAINLEVEL_OUTOF10: 9
PAINLEVEL_OUTOF10: 7
PAINLEVEL_OUTOF10: 5

## 2023-05-18 ASSESSMENT — PAIN SCALES - WONG BAKER
WONGBAKER_NUMERICALRESPONSE: 0
WONGBAKER_NUMERICALRESPONSE: 2

## 2023-05-18 ASSESSMENT — PAIN DESCRIPTION - LOCATION
LOCATION: HIP
LOCATION: PELVIS
LOCATION: HIP;LEG
LOCATION: PELVIS
LOCATION: HIP

## 2023-05-18 NOTE — PLAN OF CARE
PT/OT STOOD PT AND HE WAS INCONTINENT OF A LARGE AMOUNT OF URINE. ACE WRAP WAS SOILED AND NEEDED CHANGED AGAIN.

## 2023-05-18 NOTE — PLAN OF CARE
PT HAD A VERY LARGE SOFT BM. PT CLEANSED AND LINEN PADS AND GOWN CHANGED. ALSO ACE WRAP BECAME SOILED AND WAS CHANGED.

## 2023-05-18 NOTE — PLAN OF CARE
VOIDED 200ML IN URINAL AND BLADDER SCANNED FOR [facility] PRICE [FreeTextEntry1] : On physical examination and neurovascular status of the exposed toes is intact.  Part of the left hip wound that can be observed reveals no evidence of infection.

## 2023-05-18 NOTE — PLAN OF CARE
Problem: Pain  Goal: Verbalizes/displays adequate comfort level or baseline comfort level  Outcome: Progressing  Flowsheets (Taken 5/17/2023 1007)  Verbalizes/displays adequate comfort level or baseline comfort level: Encourage patient to monitor pain and request assistance

## 2023-05-18 NOTE — CARE COORDINATION
5/18/2023social work transition of care planning  Pt plan is to Acute rehab,once medically stable. Auth good thru 5/23.   Electronically signed by RC Vance on 5/18/2023 at 7:25 AM

## 2023-05-19 PROBLEM — Z74.09 IMPAIRED MOBILITY AND ADLS: Status: ACTIVE | Noted: 2023-05-19

## 2023-05-19 PROBLEM — Z78.9 IMPAIRED MOBILITY AND ADLS: Status: ACTIVE | Noted: 2023-05-19

## 2023-05-19 PROBLEM — R32 URINARY INCONTINENCE: Status: ACTIVE | Noted: 2023-05-19

## 2023-05-19 LAB
ANION GAP SERPL CALCULATED.3IONS-SCNC: 13 MMOL/L (ref 7–16)
BASOPHILS # BLD: 0.03 E9/L (ref 0–0.2)
BASOPHILS NFR BLD: 0.5 % (ref 0–2)
BUN SERPL-MCNC: 12 MG/DL (ref 6–20)
CALCIUM SERPL-MCNC: 8.4 MG/DL (ref 8.6–10.2)
CHLORIDE SERPL-SCNC: 96 MMOL/L (ref 98–107)
CO2 SERPL-SCNC: 23 MMOL/L (ref 22–29)
CREAT SERPL-MCNC: 0.6 MG/DL (ref 0.7–1.2)
EOSINOPHIL # BLD: 0.07 E9/L (ref 0.05–0.5)
EOSINOPHIL NFR BLD: 1.1 % (ref 0–6)
ERYTHROCYTE [DISTWIDTH] IN BLOOD BY AUTOMATED COUNT: 13 FL (ref 11.5–15)
GLUCOSE SERPL-MCNC: 110 MG/DL (ref 74–99)
HCT VFR BLD AUTO: 25.3 % (ref 37–54)
HGB BLD-MCNC: 8.2 G/DL (ref 12.5–16.5)
IMM GRANULOCYTES # BLD: 0.15 E9/L
IMM GRANULOCYTES NFR BLD: 2.4 % (ref 0–5)
LYMPHOCYTES # BLD: 1.12 E9/L (ref 1.5–4)
LYMPHOCYTES NFR BLD: 17.8 % (ref 20–42)
MCH RBC QN AUTO: 29.6 PG (ref 26–35)
MCHC RBC AUTO-ENTMCNC: 32.4 % (ref 32–34.5)
MCV RBC AUTO: 91.3 FL (ref 80–99.9)
MONOCYTES # BLD: 0.56 E9/L (ref 0.1–0.95)
MONOCYTES NFR BLD: 8.9 % (ref 2–12)
NEUTROPHILS # BLD: 4.37 E9/L (ref 1.8–7.3)
NEUTS SEG NFR BLD: 69.3 % (ref 43–80)
PLATELET # BLD AUTO: 830 E9/L (ref 130–450)
PMV BLD AUTO: 8.8 FL (ref 7–12)
POTASSIUM SERPL-SCNC: 4.3 MMOL/L (ref 3.5–5)
RBC # BLD AUTO: 2.77 E12/L (ref 3.8–5.8)
SODIUM SERPL-SCNC: 132 MMOL/L (ref 132–146)
WBC # BLD: 6.3 E9/L (ref 4.5–11.5)

## 2023-05-19 PROCEDURE — 6360000002 HC RX W HCPCS: Performed by: CLINICAL NURSE SPECIALIST

## 2023-05-19 PROCEDURE — 1280000000 HC REHAB R&B

## 2023-05-19 PROCEDURE — 99233 SBSQ HOSP IP/OBS HIGH 50: CPT | Performed by: PHYSICAL MEDICINE & REHABILITATION

## 2023-05-19 PROCEDURE — 97110 THERAPEUTIC EXERCISES: CPT

## 2023-05-19 PROCEDURE — 97530 THERAPEUTIC ACTIVITIES: CPT

## 2023-05-19 PROCEDURE — 80048 BASIC METABOLIC PNL TOTAL CA: CPT

## 2023-05-19 PROCEDURE — 85025 COMPLETE CBC W/AUTO DIFF WBC: CPT

## 2023-05-19 PROCEDURE — 36415 COLL VENOUS BLD VENIPUNCTURE: CPT

## 2023-05-19 PROCEDURE — 97162 PT EVAL MOD COMPLEX 30 MIN: CPT

## 2023-05-19 PROCEDURE — 6370000000 HC RX 637 (ALT 250 FOR IP): Performed by: CLINICAL NURSE SPECIALIST

## 2023-05-19 PROCEDURE — 97167 OT EVAL HIGH COMPLEX 60 MIN: CPT

## 2023-05-19 PROCEDURE — 97535 SELF CARE MNGMENT TRAINING: CPT

## 2023-05-19 RX ADMIN — MORPHINE SULFATE 30 MG: 15 TABLET, FILM COATED, EXTENDED RELEASE ORAL at 08:55

## 2023-05-19 RX ADMIN — POLYETHYLENE GLYCOL 3350 17 G: 17 POWDER, FOR SOLUTION ORAL at 07:42

## 2023-05-19 RX ADMIN — ENOXAPARIN SODIUM 30 MG: 100 INJECTION SUBCUTANEOUS at 20:54

## 2023-05-19 RX ADMIN — ACETAMINOPHEN 650 MG: 325 TABLET ORAL at 07:41

## 2023-05-19 RX ADMIN — BACITRACIN ZINC: 500 OINTMENT TOPICAL at 20:56

## 2023-05-19 RX ADMIN — MORPHINE SULFATE 30 MG: 15 TABLET, FILM COATED, EXTENDED RELEASE ORAL at 20:48

## 2023-05-19 RX ADMIN — OXYCODONE HYDROCHLORIDE 15 MG: 15 TABLET ORAL at 07:40

## 2023-05-19 RX ADMIN — METHOCARBAMOL 1500 MG: 750 TABLET ORAL at 07:41

## 2023-05-19 RX ADMIN — GABAPENTIN 400 MG: 400 CAPSULE ORAL at 06:15

## 2023-05-19 RX ADMIN — METHOCARBAMOL 1500 MG: 750 TABLET ORAL at 20:16

## 2023-05-19 RX ADMIN — OXYCODONE HYDROCHLORIDE 15 MG: 15 TABLET ORAL at 20:47

## 2023-05-19 RX ADMIN — OXYCODONE HYDROCHLORIDE 15 MG: 15 TABLET ORAL at 03:38

## 2023-05-19 RX ADMIN — BACITRACIN ZINC: 500 OINTMENT TOPICAL at 07:42

## 2023-05-19 RX ADMIN — BACITRACIN ZINC: 500 OINTMENT TOPICAL at 14:42

## 2023-05-19 RX ADMIN — OXYCODONE HYDROCHLORIDE 15 MG: 15 TABLET ORAL at 15:45

## 2023-05-19 RX ADMIN — BACITRACIN ZINC, NEOMYCIN SULFATE, POLYMYXIN B SULFATE 1 G: 3.5; 5000; 4 OINTMENT TOPICAL at 07:42

## 2023-05-19 RX ADMIN — METHOCARBAMOL 1500 MG: 750 TABLET ORAL at 14:17

## 2023-05-19 RX ADMIN — OXYCODONE HYDROCHLORIDE 15 MG: 15 TABLET ORAL at 11:43

## 2023-05-19 RX ADMIN — ENOXAPARIN SODIUM 30 MG: 100 INJECTION SUBCUTANEOUS at 07:42

## 2023-05-19 RX ADMIN — NALOXEGOL OXALATE 25 MG: 25 TABLET, FILM COATED ORAL at 06:15

## 2023-05-19 RX ADMIN — ACETAMINOPHEN 650 MG: 325 TABLET ORAL at 03:38

## 2023-05-19 RX ADMIN — ACETAMINOPHEN 650 MG: 325 TABLET ORAL at 20:52

## 2023-05-19 RX ADMIN — GABAPENTIN 400 MG: 400 CAPSULE ORAL at 14:17

## 2023-05-19 RX ADMIN — ACETAMINOPHEN 650 MG: 325 TABLET ORAL at 14:18

## 2023-05-19 ASSESSMENT — PAIN DESCRIPTION - LOCATION
LOCATION: HIP
LOCATION: PELVIS

## 2023-05-19 ASSESSMENT — PAIN - FUNCTIONAL ASSESSMENT: PAIN_FUNCTIONAL_ASSESSMENT: ACTIVITIES ARE NOT PREVENTED

## 2023-05-19 ASSESSMENT — PAIN DESCRIPTION - ORIENTATION
ORIENTATION: MID
ORIENTATION: MID
ORIENTATION: RIGHT;LEFT
ORIENTATION: MID
ORIENTATION: MID
ORIENTATION: POSTERIOR

## 2023-05-19 ASSESSMENT — PAIN SCALES - GENERAL
PAINLEVEL_OUTOF10: 8
PAINLEVEL_OUTOF10: 7
PAINLEVEL_OUTOF10: 6
PAINLEVEL_OUTOF10: 7
PAINLEVEL_OUTOF10: 8
PAINLEVEL_OUTOF10: 3

## 2023-05-19 ASSESSMENT — PAIN DESCRIPTION - DESCRIPTORS
DESCRIPTORS: STABBING;THROBBING
DESCRIPTORS: THROBBING;STABBING
DESCRIPTORS: THROBBING
DESCRIPTORS: ACHING;DISCOMFORT;POUNDING
DESCRIPTORS: ACHING;THROBBING
DESCRIPTORS: THROBBING;STABBING

## 2023-05-20 PROCEDURE — 97530 THERAPEUTIC ACTIVITIES: CPT

## 2023-05-20 PROCEDURE — 6370000000 HC RX 637 (ALT 250 FOR IP): Performed by: CLINICAL NURSE SPECIALIST

## 2023-05-20 PROCEDURE — 99233 SBSQ HOSP IP/OBS HIGH 50: CPT | Performed by: PHYSICAL MEDICINE & REHABILITATION

## 2023-05-20 PROCEDURE — 6360000002 HC RX W HCPCS: Performed by: CLINICAL NURSE SPECIALIST

## 2023-05-20 PROCEDURE — 1280000000 HC REHAB R&B

## 2023-05-20 RX ADMIN — BACITRACIN ZINC: 500 OINTMENT TOPICAL at 11:12

## 2023-05-20 RX ADMIN — OXYCODONE HYDROCHLORIDE 15 MG: 15 TABLET ORAL at 02:48

## 2023-05-20 RX ADMIN — METHOCARBAMOL 1500 MG: 750 TABLET ORAL at 20:40

## 2023-05-20 RX ADMIN — OXYCODONE HYDROCHLORIDE 15 MG: 15 TABLET ORAL at 16:12

## 2023-05-20 RX ADMIN — ACETAMINOPHEN 650 MG: 325 TABLET ORAL at 14:22

## 2023-05-20 RX ADMIN — MORPHINE SULFATE 30 MG: 15 TABLET, FILM COATED, EXTENDED RELEASE ORAL at 20:40

## 2023-05-20 RX ADMIN — MORPHINE SULFATE 30 MG: 15 TABLET, FILM COATED, EXTENDED RELEASE ORAL at 09:30

## 2023-05-20 RX ADMIN — ACETAMINOPHEN 650 MG: 325 TABLET ORAL at 01:31

## 2023-05-20 RX ADMIN — POLYETHYLENE GLYCOL 3350 17 G: 17 POWDER, FOR SOLUTION ORAL at 09:30

## 2023-05-20 RX ADMIN — OXYCODONE HYDROCHLORIDE 15 MG: 15 TABLET ORAL at 06:54

## 2023-05-20 RX ADMIN — GABAPENTIN 400 MG: 400 CAPSULE ORAL at 16:12

## 2023-05-20 RX ADMIN — OXYCODONE HYDROCHLORIDE 15 MG: 15 TABLET ORAL at 22:01

## 2023-05-20 RX ADMIN — ACETAMINOPHEN 650 MG: 325 TABLET ORAL at 09:26

## 2023-05-20 RX ADMIN — GABAPENTIN 400 MG: 400 CAPSULE ORAL at 06:53

## 2023-05-20 RX ADMIN — METHOCARBAMOL 1500 MG: 750 TABLET ORAL at 09:26

## 2023-05-20 RX ADMIN — GABAPENTIN 400 MG: 400 CAPSULE ORAL at 23:47

## 2023-05-20 RX ADMIN — ENOXAPARIN SODIUM 30 MG: 100 INJECTION SUBCUTANEOUS at 20:40

## 2023-05-20 RX ADMIN — BACITRACIN ZINC, NEOMYCIN SULFATE, POLYMYXIN B SULFATE: 3.5; 5000; 4 OINTMENT TOPICAL at 20:30

## 2023-05-20 RX ADMIN — OXYCODONE HYDROCHLORIDE 15 MG: 15 TABLET ORAL at 11:57

## 2023-05-20 RX ADMIN — GABAPENTIN 400 MG: 400 CAPSULE ORAL at 01:33

## 2023-05-20 RX ADMIN — BACITRACIN ZINC: 500 OINTMENT TOPICAL at 20:29

## 2023-05-20 RX ADMIN — ENOXAPARIN SODIUM 30 MG: 100 INJECTION SUBCUTANEOUS at 09:30

## 2023-05-20 RX ADMIN — NALOXEGOL OXALATE 25 MG: 25 TABLET, FILM COATED ORAL at 06:54

## 2023-05-20 RX ADMIN — METHOCARBAMOL 1500 MG: 750 TABLET ORAL at 14:22

## 2023-05-20 RX ADMIN — BACITRACIN ZINC, NEOMYCIN SULFATE, POLYMYXIN B SULFATE: 3.5; 5000; 4 OINTMENT TOPICAL at 11:13

## 2023-05-20 RX ADMIN — ACETAMINOPHEN 650 MG: 325 TABLET ORAL at 20:40

## 2023-05-20 ASSESSMENT — PAIN SCALES - GENERAL
PAINLEVEL_OUTOF10: 9
PAINLEVEL_OUTOF10: 6
PAINLEVEL_OUTOF10: 7
PAINLEVEL_OUTOF10: 6
PAINLEVEL_OUTOF10: 8

## 2023-05-20 ASSESSMENT — PAIN DESCRIPTION - ORIENTATION
ORIENTATION: RIGHT;LEFT
ORIENTATION: RIGHT;LEFT;LOWER;MID
ORIENTATION: LEFT;RIGHT;LOWER
ORIENTATION: LEFT;RIGHT

## 2023-05-20 ASSESSMENT — PAIN DESCRIPTION - DESCRIPTORS
DESCRIPTORS: DISCOMFORT;THROBBING;SORE
DESCRIPTORS: THROBBING;DISCOMFORT;SORE

## 2023-05-20 ASSESSMENT — PAIN DESCRIPTION - LOCATION
LOCATION: PELVIS
LOCATION: PELVIS
LOCATION: LEG
LOCATION: PELVIS
LOCATION: LEG;FOOT

## 2023-05-21 PROCEDURE — 97110 THERAPEUTIC EXERCISES: CPT

## 2023-05-21 PROCEDURE — 6370000000 HC RX 637 (ALT 250 FOR IP): Performed by: CLINICAL NURSE SPECIALIST

## 2023-05-21 PROCEDURE — 1280000000 HC REHAB R&B

## 2023-05-21 PROCEDURE — 6370000000 HC RX 637 (ALT 250 FOR IP): Performed by: PHYSICAL MEDICINE & REHABILITATION

## 2023-05-21 PROCEDURE — 6360000002 HC RX W HCPCS: Performed by: CLINICAL NURSE SPECIALIST

## 2023-05-21 RX ORDER — ASCORBIC ACID 500 MG
500 TABLET ORAL DAILY
Status: DISCONTINUED | OUTPATIENT
Start: 2023-05-21 | End: 2023-06-16 | Stop reason: HOSPADM

## 2023-05-21 RX ORDER — LANOLIN ALCOHOL/MO/W.PET/CERES
400 CREAM (GRAM) TOPICAL DAILY
Status: DISCONTINUED | OUTPATIENT
Start: 2023-05-21 | End: 2023-06-16 | Stop reason: HOSPADM

## 2023-05-21 RX ADMIN — ACETAMINOPHEN 650 MG: 325 TABLET ORAL at 00:58

## 2023-05-21 RX ADMIN — OXYCODONE HYDROCHLORIDE 15 MG: 15 TABLET ORAL at 03:56

## 2023-05-21 RX ADMIN — BACITRACIN ZINC: 500 OINTMENT TOPICAL at 15:06

## 2023-05-21 RX ADMIN — BACITRACIN ZINC: 500 OINTMENT TOPICAL at 21:21

## 2023-05-21 RX ADMIN — GABAPENTIN 400 MG: 400 CAPSULE ORAL at 16:54

## 2023-05-21 RX ADMIN — OXYCODONE HYDROCHLORIDE 15 MG: 15 TABLET ORAL at 08:24

## 2023-05-21 RX ADMIN — BACITRACIN ZINC, NEOMYCIN SULFATE, POLYMYXIN B SULFATE: 3.5; 5000; 4 OINTMENT TOPICAL at 21:24

## 2023-05-21 RX ADMIN — METHOCARBAMOL 1500 MG: 750 TABLET ORAL at 09:41

## 2023-05-21 RX ADMIN — ACETAMINOPHEN 650 MG: 325 TABLET ORAL at 21:19

## 2023-05-21 RX ADMIN — BACITRACIN ZINC: 500 OINTMENT TOPICAL at 11:48

## 2023-05-21 RX ADMIN — ACETAMINOPHEN 650 MG: 325 TABLET ORAL at 09:41

## 2023-05-21 RX ADMIN — OXYCODONE HYDROCHLORIDE 15 MG: 15 TABLET ORAL at 16:54

## 2023-05-21 RX ADMIN — GABAPENTIN 400 MG: 400 CAPSULE ORAL at 09:41

## 2023-05-21 RX ADMIN — NALOXEGOL OXALATE 25 MG: 25 TABLET, FILM COATED ORAL at 06:47

## 2023-05-21 RX ADMIN — ENOXAPARIN SODIUM 30 MG: 100 INJECTION SUBCUTANEOUS at 09:41

## 2023-05-21 RX ADMIN — POLYETHYLENE GLYCOL 3350 17 G: 17 POWDER, FOR SOLUTION ORAL at 09:41

## 2023-05-21 RX ADMIN — METHOCARBAMOL 1500 MG: 750 TABLET ORAL at 21:20

## 2023-05-21 RX ADMIN — Medication 500 MG: at 15:04

## 2023-05-21 RX ADMIN — MORPHINE SULFATE 30 MG: 15 TABLET, FILM COATED, EXTENDED RELEASE ORAL at 09:40

## 2023-05-21 RX ADMIN — OXYCODONE HYDROCHLORIDE 15 MG: 15 TABLET ORAL at 12:32

## 2023-05-21 RX ADMIN — BACITRACIN ZINC, NEOMYCIN SULFATE, POLYMYXIN B SULFATE: 3.5; 5000; 4 OINTMENT TOPICAL at 11:48

## 2023-05-21 RX ADMIN — ACETAMINOPHEN 650 MG: 325 TABLET ORAL at 14:54

## 2023-05-21 RX ADMIN — METHOCARBAMOL 1500 MG: 750 TABLET ORAL at 14:54

## 2023-05-21 RX ADMIN — OXYCODONE HYDROCHLORIDE 15 MG: 15 TABLET ORAL at 22:07

## 2023-05-21 RX ADMIN — Medication 400 MG: at 15:04

## 2023-05-21 RX ADMIN — ENOXAPARIN SODIUM 30 MG: 100 INJECTION SUBCUTANEOUS at 21:20

## 2023-05-21 RX ADMIN — MORPHINE SULFATE 30 MG: 15 TABLET, FILM COATED, EXTENDED RELEASE ORAL at 21:20

## 2023-05-21 ASSESSMENT — PAIN SCALES - GENERAL
PAINLEVEL_OUTOF10: 8
PAINLEVEL_OUTOF10: 6
PAINLEVEL_OUTOF10: 7
PAINLEVEL_OUTOF10: 8
PAINLEVEL_OUTOF10: 8
PAINLEVEL_OUTOF10: 5
PAINLEVEL_OUTOF10: 7

## 2023-05-21 ASSESSMENT — PAIN DESCRIPTION - DESCRIPTORS
DESCRIPTORS: ACHING;BURNING;SORE
DESCRIPTORS: ACHING;SHARP;SHOOTING
DESCRIPTORS: THROBBING;ACHING
DESCRIPTORS: ACHING;DISCOMFORT;THROBBING;SORE
DESCRIPTORS: THROBBING;TENDER;SORE
DESCRIPTORS: THROBBING;SHOOTING;SHARP

## 2023-05-21 ASSESSMENT — PAIN DESCRIPTION - ORIENTATION
ORIENTATION: LEFT;RIGHT
ORIENTATION: LEFT;RIGHT
ORIENTATION: ANTERIOR
ORIENTATION: LEFT;RIGHT
ORIENTATION: MID;LOWER
ORIENTATION: LEFT;RIGHT

## 2023-05-21 ASSESSMENT — PAIN DESCRIPTION - LOCATION
LOCATION: PELVIS
LOCATION: PELVIS;HIP
LOCATION: HIP;PELVIS

## 2023-05-21 ASSESSMENT — PAIN - FUNCTIONAL ASSESSMENT: PAIN_FUNCTIONAL_ASSESSMENT: ACTIVITIES ARE NOT PREVENTED

## 2023-05-21 ASSESSMENT — PAIN SCALES - WONG BAKER: WONGBAKER_NUMERICALRESPONSE: 8

## 2023-05-22 LAB
ERYTHROCYTE [DISTWIDTH] IN BLOOD BY AUTOMATED COUNT: 13.5 FL (ref 11.5–15)
HCT VFR BLD AUTO: 27.7 % (ref 37–54)
HGB BLD-MCNC: 8.3 G/DL (ref 12.5–16.5)
MCH RBC QN AUTO: 28.3 PG (ref 26–35)
MCHC RBC AUTO-ENTMCNC: 30 % (ref 32–34.5)
MCV RBC AUTO: 94.5 FL (ref 80–99.9)
PLATELET # BLD AUTO: 788 E9/L (ref 130–450)
PMV BLD AUTO: 8.4 FL (ref 7–12)
RBC # BLD AUTO: 2.93 E12/L (ref 3.8–5.8)
WBC # BLD: 5.4 E9/L (ref 4.5–11.5)

## 2023-05-22 PROCEDURE — 6370000000 HC RX 637 (ALT 250 FOR IP): Performed by: PHYSICAL MEDICINE & REHABILITATION

## 2023-05-22 PROCEDURE — 85027 COMPLETE CBC AUTOMATED: CPT

## 2023-05-22 PROCEDURE — 97535 SELF CARE MNGMENT TRAINING: CPT

## 2023-05-22 PROCEDURE — 99232 SBSQ HOSP IP/OBS MODERATE 35: CPT | Performed by: PHYSICAL MEDICINE & REHABILITATION

## 2023-05-22 PROCEDURE — 6360000002 HC RX W HCPCS: Performed by: CLINICAL NURSE SPECIALIST

## 2023-05-22 PROCEDURE — 97530 THERAPEUTIC ACTIVITIES: CPT

## 2023-05-22 PROCEDURE — 6370000000 HC RX 637 (ALT 250 FOR IP): Performed by: CLINICAL NURSE SPECIALIST

## 2023-05-22 PROCEDURE — 97110 THERAPEUTIC EXERCISES: CPT

## 2023-05-22 PROCEDURE — 36415 COLL VENOUS BLD VENIPUNCTURE: CPT

## 2023-05-22 PROCEDURE — 1280000000 HC REHAB R&B

## 2023-05-22 RX ORDER — LACTULOSE 10 G/15ML
20 SOLUTION ORAL 2 TIMES DAILY PRN
Status: DISCONTINUED | OUTPATIENT
Start: 2023-05-22 | End: 2023-06-16 | Stop reason: HOSPADM

## 2023-05-22 RX ORDER — BISACODYL 10 MG
10 SUPPOSITORY, RECTAL RECTAL DAILY PRN
Status: DISCONTINUED | OUTPATIENT
Start: 2023-05-22 | End: 2023-06-16 | Stop reason: HOSPADM

## 2023-05-22 RX ADMIN — METHOCARBAMOL 1500 MG: 750 TABLET ORAL at 08:45

## 2023-05-22 RX ADMIN — ACETAMINOPHEN 650 MG: 325 TABLET ORAL at 15:02

## 2023-05-22 RX ADMIN — GABAPENTIN 400 MG: 400 CAPSULE ORAL at 00:53

## 2023-05-22 RX ADMIN — OXYCODONE HYDROCHLORIDE 10 MG: 5 TABLET ORAL at 01:49

## 2023-05-22 RX ADMIN — GABAPENTIN 400 MG: 400 CAPSULE ORAL at 15:01

## 2023-05-22 RX ADMIN — METHOCARBAMOL 1500 MG: 750 TABLET ORAL at 15:02

## 2023-05-22 RX ADMIN — MORPHINE SULFATE 30 MG: 15 TABLET, FILM COATED, EXTENDED RELEASE ORAL at 08:45

## 2023-05-22 RX ADMIN — OXYCODONE HYDROCHLORIDE 10 MG: 5 TABLET ORAL at 15:01

## 2023-05-22 RX ADMIN — ACETAMINOPHEN 650 MG: 325 TABLET ORAL at 08:44

## 2023-05-22 RX ADMIN — MORPHINE SULFATE 30 MG: 15 TABLET, FILM COATED, EXTENDED RELEASE ORAL at 21:37

## 2023-05-22 RX ADMIN — ACETAMINOPHEN 650 MG: 325 TABLET ORAL at 21:30

## 2023-05-22 RX ADMIN — OXYCODONE HYDROCHLORIDE 15 MG: 15 TABLET ORAL at 11:04

## 2023-05-22 RX ADMIN — POLYETHYLENE GLYCOL 3350 17 G: 17 POWDER, FOR SOLUTION ORAL at 08:45

## 2023-05-22 RX ADMIN — NALOXEGOL OXALATE 25 MG: 25 TABLET, FILM COATED ORAL at 06:57

## 2023-05-22 RX ADMIN — CALCIUM CARBONATE-VITAMIN D TAB 500 MG-200 UNIT 1 TABLET: 500-200 TAB at 08:45

## 2023-05-22 RX ADMIN — ACETAMINOPHEN 650 MG: 325 TABLET ORAL at 01:50

## 2023-05-22 RX ADMIN — GABAPENTIN 400 MG: 400 CAPSULE ORAL at 08:53

## 2023-05-22 RX ADMIN — OXYCODONE HYDROCHLORIDE 15 MG: 15 TABLET ORAL at 06:57

## 2023-05-22 RX ADMIN — Medication 500 MG: at 08:45

## 2023-05-22 RX ADMIN — Medication 400 MG: at 08:54

## 2023-05-22 RX ADMIN — OXYCODONE HYDROCHLORIDE 15 MG: 15 TABLET ORAL at 18:55

## 2023-05-22 RX ADMIN — ENOXAPARIN SODIUM 30 MG: 100 INJECTION SUBCUTANEOUS at 08:45

## 2023-05-22 ASSESSMENT — PAIN DESCRIPTION - LOCATION
LOCATION: HIP;PELVIS
LOCATION: PELVIS
LOCATION: PELVIS
LOCATION: HIP;PELVIS
LOCATION: PELVIS

## 2023-05-22 ASSESSMENT — PAIN SCALES - GENERAL
PAINLEVEL_OUTOF10: 8
PAINLEVEL_OUTOF10: 7
PAINLEVEL_OUTOF10: 6
PAINLEVEL_OUTOF10: 8
PAINLEVEL_OUTOF10: 8

## 2023-05-22 ASSESSMENT — PAIN DESCRIPTION - DESCRIPTORS
DESCRIPTORS: ACHING;THROBBING;NAGGING
DESCRIPTORS: ACHING;DISCOMFORT;DULL
DESCRIPTORS: ACHING;DISCOMFORT;DULL
DESCRIPTORS: ACHING;THROBBING;DISCOMFORT

## 2023-05-22 ASSESSMENT — PAIN - FUNCTIONAL ASSESSMENT: PAIN_FUNCTIONAL_ASSESSMENT: ACTIVITIES ARE NOT PREVENTED

## 2023-05-22 ASSESSMENT — PAIN DESCRIPTION - ORIENTATION
ORIENTATION: LEFT;RIGHT
ORIENTATION: LEFT;RIGHT
ORIENTATION: LOWER
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
## 2023-05-23 PROCEDURE — 1280000000 HC REHAB R&B

## 2023-05-23 PROCEDURE — 99232 SBSQ HOSP IP/OBS MODERATE 35: CPT | Performed by: PHYSICAL MEDICINE & REHABILITATION

## 2023-05-23 PROCEDURE — 6360000002 HC RX W HCPCS: Performed by: CLINICAL NURSE SPECIALIST

## 2023-05-23 PROCEDURE — 6370000000 HC RX 637 (ALT 250 FOR IP): Performed by: PHYSICAL MEDICINE & REHABILITATION

## 2023-05-23 PROCEDURE — 97530 THERAPEUTIC ACTIVITIES: CPT

## 2023-05-23 PROCEDURE — 97110 THERAPEUTIC EXERCISES: CPT

## 2023-05-23 PROCEDURE — 6370000000 HC RX 637 (ALT 250 FOR IP): Performed by: CLINICAL NURSE SPECIALIST

## 2023-05-23 RX ORDER — MORPHINE SULFATE 15 MG/1
30 TABLET, FILM COATED, EXTENDED RELEASE ORAL EVERY 12 HOURS SCHEDULED
Status: DISCONTINUED | OUTPATIENT
Start: 2023-05-23 | End: 2023-05-31

## 2023-05-23 RX ADMIN — GABAPENTIN 400 MG: 400 CAPSULE ORAL at 16:36

## 2023-05-23 RX ADMIN — NALOXEGOL OXALATE 25 MG: 25 TABLET, FILM COATED ORAL at 05:58

## 2023-05-23 RX ADMIN — METHOCARBAMOL 1500 MG: 750 TABLET ORAL at 09:36

## 2023-05-23 RX ADMIN — Medication 500 MG: at 09:36

## 2023-05-23 RX ADMIN — ACETAMINOPHEN 650 MG: 325 TABLET ORAL at 09:36

## 2023-05-23 RX ADMIN — ACETAMINOPHEN 650 MG: 325 TABLET ORAL at 21:34

## 2023-05-23 RX ADMIN — BACITRACIN ZINC: 500 OINTMENT TOPICAL at 09:36

## 2023-05-23 RX ADMIN — ACETAMINOPHEN 650 MG: 325 TABLET ORAL at 14:05

## 2023-05-23 RX ADMIN — GABAPENTIN 400 MG: 400 CAPSULE ORAL at 09:36

## 2023-05-23 RX ADMIN — OXYCODONE HYDROCHLORIDE 15 MG: 15 TABLET ORAL at 09:52

## 2023-05-23 RX ADMIN — CALCIUM CARBONATE-VITAMIN D TAB 500 MG-200 UNIT 1 TABLET: 500-200 TAB at 09:36

## 2023-05-23 RX ADMIN — ENOXAPARIN SODIUM 30 MG: 100 INJECTION SUBCUTANEOUS at 21:35

## 2023-05-23 RX ADMIN — METHOCARBAMOL 1500 MG: 750 TABLET ORAL at 00:50

## 2023-05-23 RX ADMIN — OXYCODONE HYDROCHLORIDE 15 MG: 15 TABLET ORAL at 18:06

## 2023-05-23 RX ADMIN — MORPHINE SULFATE 30 MG: 15 TABLET, FILM COATED, EXTENDED RELEASE ORAL at 10:56

## 2023-05-23 RX ADMIN — POLYETHYLENE GLYCOL 3350 17 G: 17 POWDER, FOR SOLUTION ORAL at 09:39

## 2023-05-23 RX ADMIN — OXYCODONE HYDROCHLORIDE 15 MG: 15 TABLET ORAL at 22:39

## 2023-05-23 RX ADMIN — METHOCARBAMOL 1500 MG: 750 TABLET ORAL at 21:35

## 2023-05-23 RX ADMIN — METHOCARBAMOL 1500 MG: 750 TABLET ORAL at 14:05

## 2023-05-23 RX ADMIN — MORPHINE SULFATE 30 MG: 15 TABLET, FILM COATED, EXTENDED RELEASE ORAL at 21:35

## 2023-05-23 RX ADMIN — OXYCODONE HYDROCHLORIDE 15 MG: 15 TABLET ORAL at 14:05

## 2023-05-23 RX ADMIN — Medication 400 MG: at 09:36

## 2023-05-23 RX ADMIN — BACITRACIN ZINC: 500 OINTMENT TOPICAL at 14:10

## 2023-05-23 RX ADMIN — OXYCODONE HYDROCHLORIDE 15 MG: 15 TABLET ORAL at 05:53

## 2023-05-23 RX ADMIN — GABAPENTIN 400 MG: 400 CAPSULE ORAL at 00:54

## 2023-05-23 RX ADMIN — ENOXAPARIN SODIUM 30 MG: 100 INJECTION SUBCUTANEOUS at 00:48

## 2023-05-23 RX ADMIN — ENOXAPARIN SODIUM 30 MG: 100 INJECTION SUBCUTANEOUS at 09:36

## 2023-05-23 RX ADMIN — BACITRACIN ZINC, NEOMYCIN SULFATE, POLYMYXIN B SULFATE: 3.5; 5000; 4 OINTMENT TOPICAL at 09:35

## 2023-05-23 ASSESSMENT — PAIN - FUNCTIONAL ASSESSMENT
PAIN_FUNCTIONAL_ASSESSMENT: ACTIVITIES ARE NOT PREVENTED
PAIN_FUNCTIONAL_ASSESSMENT: ACTIVITIES ARE NOT PREVENTED

## 2023-05-23 ASSESSMENT — PAIN SCALES - GENERAL
PAINLEVEL_OUTOF10: 7
PAINLEVEL_OUTOF10: 6
PAINLEVEL_OUTOF10: 8
PAINLEVEL_OUTOF10: 7
PAINLEVEL_OUTOF10: 8
PAINLEVEL_OUTOF10: 6

## 2023-05-23 ASSESSMENT — PAIN DESCRIPTION - LOCATION
LOCATION: PELVIS

## 2023-05-23 ASSESSMENT — PAIN DESCRIPTION - ORIENTATION
ORIENTATION: MID
ORIENTATION: LEFT;MID
ORIENTATION: MID
ORIENTATION: MID;LEFT
ORIENTATION: MID
ORIENTATION: RIGHT;LEFT
ORIENTATION: RIGHT;LEFT

## 2023-05-23 ASSESSMENT — PAIN DESCRIPTION - DESCRIPTORS
DESCRIPTORS: STABBING;THROBBING
DESCRIPTORS: THROBBING;STABBING
DESCRIPTORS: ACHING;DISCOMFORT
DESCRIPTORS: THROBBING;STABBING
DESCRIPTORS: ACHING;CRUSHING
DESCRIPTORS: NAGGING;ACHING;THROBBING
DESCRIPTORS: STABBING;THROBBING
DESCRIPTORS: ACHING;NAGGING;THROBBING

## 2023-05-24 PROCEDURE — 6370000000 HC RX 637 (ALT 250 FOR IP): Performed by: PHYSICAL MEDICINE & REHABILITATION

## 2023-05-24 PROCEDURE — 99232 SBSQ HOSP IP/OBS MODERATE 35: CPT | Performed by: PHYSICAL MEDICINE & REHABILITATION

## 2023-05-24 PROCEDURE — 97530 THERAPEUTIC ACTIVITIES: CPT

## 2023-05-24 PROCEDURE — 6370000000 HC RX 637 (ALT 250 FOR IP): Performed by: CLINICAL NURSE SPECIALIST

## 2023-05-24 PROCEDURE — 97116 GAIT TRAINING THERAPY: CPT

## 2023-05-24 PROCEDURE — 1280000000 HC REHAB R&B

## 2023-05-24 PROCEDURE — 97110 THERAPEUTIC EXERCISES: CPT

## 2023-05-24 PROCEDURE — 6360000002 HC RX W HCPCS: Performed by: CLINICAL NURSE SPECIALIST

## 2023-05-24 PROCEDURE — 97535 SELF CARE MNGMENT TRAINING: CPT

## 2023-05-24 RX ADMIN — BACITRACIN ZINC: 500 OINTMENT TOPICAL at 14:39

## 2023-05-24 RX ADMIN — GABAPENTIN 400 MG: 400 CAPSULE ORAL at 02:38

## 2023-05-24 RX ADMIN — MORPHINE SULFATE 30 MG: 15 TABLET, FILM COATED, EXTENDED RELEASE ORAL at 21:19

## 2023-05-24 RX ADMIN — ENOXAPARIN SODIUM 30 MG: 100 INJECTION SUBCUTANEOUS at 08:26

## 2023-05-24 RX ADMIN — Medication 500 MG: at 08:26

## 2023-05-24 RX ADMIN — METHOCARBAMOL 1500 MG: 750 TABLET ORAL at 21:19

## 2023-05-24 RX ADMIN — OXYCODONE HYDROCHLORIDE 15 MG: 15 TABLET ORAL at 02:38

## 2023-05-24 RX ADMIN — POLYETHYLENE GLYCOL 3350 17 G: 17 POWDER, FOR SOLUTION ORAL at 08:27

## 2023-05-24 RX ADMIN — OXYCODONE HYDROCHLORIDE 15 MG: 15 TABLET ORAL at 22:45

## 2023-05-24 RX ADMIN — CALCIUM CARBONATE-VITAMIN D TAB 500 MG-200 UNIT 1 TABLET: 500-200 TAB at 08:27

## 2023-05-24 RX ADMIN — BACITRACIN ZINC, NEOMYCIN SULFATE, POLYMYXIN B SULFATE: 3.5; 5000; 4 OINTMENT TOPICAL at 08:27

## 2023-05-24 RX ADMIN — METHOCARBAMOL 1500 MG: 750 TABLET ORAL at 08:26

## 2023-05-24 RX ADMIN — BACITRACIN ZINC: 500 OINTMENT TOPICAL at 08:27

## 2023-05-24 RX ADMIN — GABAPENTIN 400 MG: 400 CAPSULE ORAL at 08:27

## 2023-05-24 RX ADMIN — ACETAMINOPHEN 650 MG: 325 TABLET ORAL at 14:36

## 2023-05-24 RX ADMIN — ACETAMINOPHEN 650 MG: 325 TABLET ORAL at 08:26

## 2023-05-24 RX ADMIN — MORPHINE SULFATE 30 MG: 15 TABLET, FILM COATED, EXTENDED RELEASE ORAL at 08:26

## 2023-05-24 RX ADMIN — ENOXAPARIN SODIUM 30 MG: 100 INJECTION SUBCUTANEOUS at 21:19

## 2023-05-24 RX ADMIN — OXYCODONE HYDROCHLORIDE 15 MG: 15 TABLET ORAL at 18:46

## 2023-05-24 RX ADMIN — OXYCODONE HYDROCHLORIDE 15 MG: 15 TABLET ORAL at 14:40

## 2023-05-24 RX ADMIN — Medication 400 MG: at 08:27

## 2023-05-24 RX ADMIN — OXYCODONE HYDROCHLORIDE 15 MG: 15 TABLET ORAL at 06:40

## 2023-05-24 RX ADMIN — ACETAMINOPHEN 650 MG: 325 TABLET ORAL at 02:37

## 2023-05-24 RX ADMIN — ACETAMINOPHEN 650 MG: 325 TABLET ORAL at 21:19

## 2023-05-24 RX ADMIN — OXYCODONE HYDROCHLORIDE 15 MG: 15 TABLET ORAL at 10:44

## 2023-05-24 RX ADMIN — METHOCARBAMOL 1500 MG: 750 TABLET ORAL at 14:36

## 2023-05-24 RX ADMIN — NALOXEGOL OXALATE 25 MG: 25 TABLET, FILM COATED ORAL at 06:43

## 2023-05-24 RX ADMIN — GABAPENTIN 400 MG: 400 CAPSULE ORAL at 16:05

## 2023-05-24 ASSESSMENT — PAIN DESCRIPTION - LOCATION
LOCATION: PELVIS

## 2023-05-24 ASSESSMENT — PAIN SCALES - GENERAL
PAINLEVEL_OUTOF10: 6
PAINLEVEL_OUTOF10: 8
PAINLEVEL_OUTOF10: 8
PAINLEVEL_OUTOF10: 6
PAINLEVEL_OUTOF10: 8
PAINLEVEL_OUTOF10: 7
PAINLEVEL_OUTOF10: 7

## 2023-05-24 ASSESSMENT — PAIN DESCRIPTION - ORIENTATION
ORIENTATION: MID

## 2023-05-24 ASSESSMENT — PAIN DESCRIPTION - DESCRIPTORS
DESCRIPTORS: ACHING;DISCOMFORT
DESCRIPTORS: THROBBING;STABBING
DESCRIPTORS: STABBING;THROBBING
DESCRIPTORS: DISCOMFORT;SORE
DESCRIPTORS: THROBBING;STABBING
DESCRIPTORS: STABBING;THROBBING
DESCRIPTORS: ACHING;DISCOMFORT

## 2023-05-25 ENCOUNTER — APPOINTMENT (OUTPATIENT)
Dept: GENERAL RADIOLOGY | Age: 38
DRG: 516 | End: 2023-05-25
Attending: PHYSICAL MEDICINE & REHABILITATION
Payer: COMMERCIAL

## 2023-05-25 LAB
ANION GAP SERPL CALCULATED.3IONS-SCNC: 9 MMOL/L (ref 7–16)
BUN SERPL-MCNC: 12 MG/DL (ref 6–20)
CALCIUM SERPL-MCNC: 8.6 MG/DL (ref 8.6–10.2)
CHLORIDE SERPL-SCNC: 99 MMOL/L (ref 98–107)
CO2 SERPL-SCNC: 28 MMOL/L (ref 22–29)
CREAT SERPL-MCNC: 0.6 MG/DL (ref 0.7–1.2)
ERYTHROCYTE [DISTWIDTH] IN BLOOD BY AUTOMATED COUNT: 13.9 FL (ref 11.5–15)
GLUCOSE SERPL-MCNC: 100 MG/DL (ref 74–99)
HCT VFR BLD AUTO: 29.4 % (ref 37–54)
HGB BLD-MCNC: 9.4 G/DL (ref 12.5–16.5)
MCH RBC QN AUTO: 29.9 PG (ref 26–35)
MCHC RBC AUTO-ENTMCNC: 32 % (ref 32–34.5)
MCV RBC AUTO: 93.6 FL (ref 80–99.9)
PLATELET # BLD AUTO: 633 E9/L (ref 130–450)
PMV BLD AUTO: 8.4 FL (ref 7–12)
POTASSIUM SERPL-SCNC: 4.1 MMOL/L (ref 3.5–5)
RBC # BLD AUTO: 3.14 E12/L (ref 3.8–5.8)
SODIUM SERPL-SCNC: 136 MMOL/L (ref 132–146)
WBC # BLD: 4.3 E9/L (ref 4.5–11.5)

## 2023-05-25 PROCEDURE — 6370000000 HC RX 637 (ALT 250 FOR IP): Performed by: CLINICAL NURSE SPECIALIST

## 2023-05-25 PROCEDURE — 36415 COLL VENOUS BLD VENIPUNCTURE: CPT

## 2023-05-25 PROCEDURE — 97530 THERAPEUTIC ACTIVITIES: CPT

## 2023-05-25 PROCEDURE — 6360000002 HC RX W HCPCS: Performed by: CLINICAL NURSE SPECIALIST

## 2023-05-25 PROCEDURE — 1280000000 HC REHAB R&B

## 2023-05-25 PROCEDURE — 99233 SBSQ HOSP IP/OBS HIGH 50: CPT | Performed by: PHYSICAL MEDICINE & REHABILITATION

## 2023-05-25 PROCEDURE — 72170 X-RAY EXAM OF PELVIS: CPT

## 2023-05-25 PROCEDURE — 80048 BASIC METABOLIC PNL TOTAL CA: CPT

## 2023-05-25 PROCEDURE — 97116 GAIT TRAINING THERAPY: CPT

## 2023-05-25 PROCEDURE — 85027 COMPLETE CBC AUTOMATED: CPT

## 2023-05-25 PROCEDURE — 6370000000 HC RX 637 (ALT 250 FOR IP): Performed by: PHYSICAL MEDICINE & REHABILITATION

## 2023-05-25 PROCEDURE — 73552 X-RAY EXAM OF FEMUR 2/>: CPT

## 2023-05-25 PROCEDURE — 97110 THERAPEUTIC EXERCISES: CPT

## 2023-05-25 RX ADMIN — BACITRACIN ZINC: 500 OINTMENT TOPICAL at 21:02

## 2023-05-25 RX ADMIN — ACETAMINOPHEN 650 MG: 325 TABLET ORAL at 08:59

## 2023-05-25 RX ADMIN — OXYCODONE HYDROCHLORIDE 15 MG: 15 TABLET ORAL at 10:53

## 2023-05-25 RX ADMIN — POLYETHYLENE GLYCOL 3350 17 G: 17 POWDER, FOR SOLUTION ORAL at 08:59

## 2023-05-25 RX ADMIN — GABAPENTIN 400 MG: 400 CAPSULE ORAL at 02:37

## 2023-05-25 RX ADMIN — ENOXAPARIN SODIUM 30 MG: 100 INJECTION SUBCUTANEOUS at 20:59

## 2023-05-25 RX ADMIN — BACITRACIN ZINC, NEOMYCIN SULFATE, POLYMYXIN B SULFATE: 3.5; 5000; 4 OINTMENT TOPICAL at 21:02

## 2023-05-25 RX ADMIN — OXYCODONE HYDROCHLORIDE 15 MG: 15 TABLET ORAL at 14:56

## 2023-05-25 RX ADMIN — METHOCARBAMOL 1500 MG: 750 TABLET ORAL at 20:59

## 2023-05-25 RX ADMIN — OXYCODONE HYDROCHLORIDE 15 MG: 15 TABLET ORAL at 06:45

## 2023-05-25 RX ADMIN — ACETAMINOPHEN 650 MG: 325 TABLET ORAL at 20:59

## 2023-05-25 RX ADMIN — ENOXAPARIN SODIUM 30 MG: 100 INJECTION SUBCUTANEOUS at 08:59

## 2023-05-25 RX ADMIN — MORPHINE SULFATE 30 MG: 15 TABLET, FILM COATED, EXTENDED RELEASE ORAL at 08:59

## 2023-05-25 RX ADMIN — GABAPENTIN 400 MG: 400 CAPSULE ORAL at 09:03

## 2023-05-25 RX ADMIN — OXYCODONE HYDROCHLORIDE 15 MG: 15 TABLET ORAL at 23:04

## 2023-05-25 RX ADMIN — METHOCARBAMOL 1500 MG: 750 TABLET ORAL at 13:57

## 2023-05-25 RX ADMIN — CALCIUM CARBONATE-VITAMIN D TAB 500 MG-200 UNIT 1 TABLET: 500-200 TAB at 08:59

## 2023-05-25 RX ADMIN — OXYCODONE HYDROCHLORIDE 15 MG: 15 TABLET ORAL at 18:54

## 2023-05-25 RX ADMIN — NALOXEGOL OXALATE 25 MG: 25 TABLET, FILM COATED ORAL at 06:45

## 2023-05-25 RX ADMIN — Medication 500 MG: at 09:00

## 2023-05-25 RX ADMIN — ACETAMINOPHEN 650 MG: 325 TABLET ORAL at 02:45

## 2023-05-25 RX ADMIN — GABAPENTIN 400 MG: 400 CAPSULE ORAL at 23:03

## 2023-05-25 RX ADMIN — MORPHINE SULFATE 30 MG: 15 TABLET, FILM COATED, EXTENDED RELEASE ORAL at 20:59

## 2023-05-25 RX ADMIN — Medication 400 MG: at 08:59

## 2023-05-25 RX ADMIN — GABAPENTIN 400 MG: 400 CAPSULE ORAL at 16:16

## 2023-05-25 RX ADMIN — METHOCARBAMOL 1500 MG: 750 TABLET ORAL at 08:59

## 2023-05-25 RX ADMIN — ACETAMINOPHEN 650 MG: 325 TABLET ORAL at 13:57

## 2023-05-25 RX ADMIN — OXYCODONE HYDROCHLORIDE 15 MG: 15 TABLET ORAL at 02:45

## 2023-05-25 ASSESSMENT — PAIN DESCRIPTION - DESCRIPTORS
DESCRIPTORS: ACHING;DISCOMFORT
DESCRIPTORS: ACHING;DISCOMFORT;SORE;THROBBING
DESCRIPTORS: ACHING;DISCOMFORT;SORE
DESCRIPTORS: STABBING;THROBBING;PRESSURE
DESCRIPTORS: ACHING;DISCOMFORT

## 2023-05-25 ASSESSMENT — PAIN SCALES - GENERAL
PAINLEVEL_OUTOF10: 3
PAINLEVEL_OUTOF10: 7
PAINLEVEL_OUTOF10: 8
PAINLEVEL_OUTOF10: 7
PAINLEVEL_OUTOF10: 8
PAINLEVEL_OUTOF10: 6

## 2023-05-25 ASSESSMENT — PAIN DESCRIPTION - LOCATION
LOCATION: PELVIS

## 2023-05-25 ASSESSMENT — PAIN DESCRIPTION - ORIENTATION
ORIENTATION: RIGHT;LEFT
ORIENTATION: MID
ORIENTATION: MID
ORIENTATION: RIGHT;LEFT

## 2023-05-25 ASSESSMENT — PAIN SCALES - WONG BAKER: WONGBAKER_NUMERICALRESPONSE: 2

## 2023-05-26 PROCEDURE — 99233 SBSQ HOSP IP/OBS HIGH 50: CPT | Performed by: PHYSICAL MEDICINE & REHABILITATION

## 2023-05-26 PROCEDURE — 97116 GAIT TRAINING THERAPY: CPT

## 2023-05-26 PROCEDURE — 6370000000 HC RX 637 (ALT 250 FOR IP): Performed by: CLINICAL NURSE SPECIALIST

## 2023-05-26 PROCEDURE — 6370000000 HC RX 637 (ALT 250 FOR IP): Performed by: PHYSICAL MEDICINE & REHABILITATION

## 2023-05-26 PROCEDURE — 97110 THERAPEUTIC EXERCISES: CPT

## 2023-05-26 PROCEDURE — 1280000000 HC REHAB R&B

## 2023-05-26 PROCEDURE — 97535 SELF CARE MNGMENT TRAINING: CPT

## 2023-05-26 PROCEDURE — 6360000002 HC RX W HCPCS: Performed by: CLINICAL NURSE SPECIALIST

## 2023-05-26 PROCEDURE — 97530 THERAPEUTIC ACTIVITIES: CPT

## 2023-05-26 RX ADMIN — GABAPENTIN 400 MG: 400 CAPSULE ORAL at 08:26

## 2023-05-26 RX ADMIN — NALOXEGOL OXALATE 25 MG: 25 TABLET, FILM COATED ORAL at 06:40

## 2023-05-26 RX ADMIN — OXYCODONE HYDROCHLORIDE 15 MG: 15 TABLET ORAL at 07:15

## 2023-05-26 RX ADMIN — OXYCODONE HYDROCHLORIDE 15 MG: 15 TABLET ORAL at 15:27

## 2023-05-26 RX ADMIN — ACETAMINOPHEN 650 MG: 325 TABLET ORAL at 15:27

## 2023-05-26 RX ADMIN — OXYCODONE HYDROCHLORIDE 10 MG: 5 TABLET ORAL at 11:24

## 2023-05-26 RX ADMIN — OXYCODONE HYDROCHLORIDE 15 MG: 15 TABLET ORAL at 21:36

## 2023-05-26 RX ADMIN — Medication 500 MG: at 08:25

## 2023-05-26 RX ADMIN — BACITRACIN ZINC, NEOMYCIN SULFATE, POLYMYXIN B SULFATE: 3.5; 5000; 4 OINTMENT TOPICAL at 21:48

## 2023-05-26 RX ADMIN — POLYETHYLENE GLYCOL 3350 17 G: 17 POWDER, FOR SOLUTION ORAL at 08:25

## 2023-05-26 RX ADMIN — BACITRACIN ZINC: 500 OINTMENT TOPICAL at 21:42

## 2023-05-26 RX ADMIN — CALCIUM CARBONATE-VITAMIN D TAB 500 MG-200 UNIT 1 TABLET: 500-200 TAB at 08:26

## 2023-05-26 RX ADMIN — ENOXAPARIN SODIUM 30 MG: 100 INJECTION SUBCUTANEOUS at 08:24

## 2023-05-26 RX ADMIN — ACETAMINOPHEN 650 MG: 325 TABLET ORAL at 08:26

## 2023-05-26 RX ADMIN — METHOCARBAMOL 1500 MG: 750 TABLET ORAL at 08:25

## 2023-05-26 RX ADMIN — BACITRACIN ZINC, NEOMYCIN SULFATE, POLYMYXIN B SULFATE: 3.5; 5000; 4 OINTMENT TOPICAL at 09:39

## 2023-05-26 RX ADMIN — BACITRACIN ZINC: 500 OINTMENT TOPICAL at 15:35

## 2023-05-26 RX ADMIN — OXYCODONE HYDROCHLORIDE 15 MG: 15 TABLET ORAL at 03:05

## 2023-05-26 RX ADMIN — GABAPENTIN 400 MG: 400 CAPSULE ORAL at 15:27

## 2023-05-26 RX ADMIN — ACETAMINOPHEN 650 MG: 325 TABLET ORAL at 21:34

## 2023-05-26 RX ADMIN — ACETAMINOPHEN 650 MG: 325 TABLET ORAL at 02:04

## 2023-05-26 RX ADMIN — METHOCARBAMOL 1500 MG: 750 TABLET ORAL at 15:26

## 2023-05-26 RX ADMIN — MORPHINE SULFATE 30 MG: 15 TABLET, FILM COATED, EXTENDED RELEASE ORAL at 08:25

## 2023-05-26 RX ADMIN — ENOXAPARIN SODIUM 30 MG: 100 INJECTION SUBCUTANEOUS at 21:34

## 2023-05-26 RX ADMIN — MORPHINE SULFATE 30 MG: 15 TABLET, FILM COATED, EXTENDED RELEASE ORAL at 21:36

## 2023-05-26 RX ADMIN — Medication 400 MG: at 08:26

## 2023-05-26 RX ADMIN — BACITRACIN ZINC: 500 OINTMENT TOPICAL at 08:26

## 2023-05-26 RX ADMIN — METHOCARBAMOL 1500 MG: 750 TABLET ORAL at 21:36

## 2023-05-26 ASSESSMENT — PAIN DESCRIPTION - DESCRIPTORS
DESCRIPTORS: ACHING;DISCOMFORT
DESCRIPTORS: ACHING
DESCRIPTORS: THROBBING
DESCRIPTORS: ACHING
DESCRIPTORS: THROBBING
DESCRIPTORS: OTHER (COMMENT)
DESCRIPTORS: THROBBING

## 2023-05-26 ASSESSMENT — PAIN SCALES - GENERAL
PAINLEVEL_OUTOF10: 7
PAINLEVEL_OUTOF10: 7
PAINLEVEL_OUTOF10: 6
PAINLEVEL_OUTOF10: 7
PAINLEVEL_OUTOF10: 9
PAINLEVEL_OUTOF10: 7
PAINLEVEL_OUTOF10: 4
PAINLEVEL_OUTOF10: 6

## 2023-05-26 ASSESSMENT — PAIN DESCRIPTION - ORIENTATION
ORIENTATION: RIGHT
ORIENTATION: RIGHT;LEFT
ORIENTATION: RIGHT
ORIENTATION: RIGHT
ORIENTATION: RIGHT;LEFT

## 2023-05-26 ASSESSMENT — PAIN DESCRIPTION - LOCATION
LOCATION: LEG

## 2023-05-26 ASSESSMENT — PAIN - FUNCTIONAL ASSESSMENT
PAIN_FUNCTIONAL_ASSESSMENT: ACTIVITIES ARE NOT PREVENTED

## 2023-05-27 PROCEDURE — 6370000000 HC RX 637 (ALT 250 FOR IP): Performed by: CLINICAL NURSE SPECIALIST

## 2023-05-27 PROCEDURE — 6370000000 HC RX 637 (ALT 250 FOR IP): Performed by: PHYSICAL MEDICINE & REHABILITATION

## 2023-05-27 PROCEDURE — 1280000000 HC REHAB R&B

## 2023-05-27 PROCEDURE — 6360000002 HC RX W HCPCS: Performed by: CLINICAL NURSE SPECIALIST

## 2023-05-27 PROCEDURE — 97535 SELF CARE MNGMENT TRAINING: CPT

## 2023-05-27 RX ADMIN — METHOCARBAMOL 1500 MG: 750 TABLET ORAL at 21:42

## 2023-05-27 RX ADMIN — Medication 400 MG: at 08:50

## 2023-05-27 RX ADMIN — CALCIUM CARBONATE-VITAMIN D TAB 500 MG-200 UNIT 1 TABLET: 500-200 TAB at 08:51

## 2023-05-27 RX ADMIN — OXYCODONE HYDROCHLORIDE 15 MG: 15 TABLET ORAL at 14:04

## 2023-05-27 RX ADMIN — METHOCARBAMOL 1500 MG: 750 TABLET ORAL at 08:50

## 2023-05-27 RX ADMIN — ACETAMINOPHEN 650 MG: 325 TABLET ORAL at 08:50

## 2023-05-27 RX ADMIN — GABAPENTIN 400 MG: 400 CAPSULE ORAL at 00:50

## 2023-05-27 RX ADMIN — OXYCODONE HYDROCHLORIDE 15 MG: 15 TABLET ORAL at 01:41

## 2023-05-27 RX ADMIN — ACETAMINOPHEN 650 MG: 325 TABLET ORAL at 21:43

## 2023-05-27 RX ADMIN — GABAPENTIN 400 MG: 400 CAPSULE ORAL at 08:50

## 2023-05-27 RX ADMIN — Medication 500 MG: at 08:50

## 2023-05-27 RX ADMIN — OXYCODONE HYDROCHLORIDE 15 MG: 15 TABLET ORAL at 10:04

## 2023-05-27 RX ADMIN — BACITRACIN ZINC: 500 OINTMENT TOPICAL at 08:51

## 2023-05-27 RX ADMIN — ACETAMINOPHEN 650 MG: 325 TABLET ORAL at 13:57

## 2023-05-27 RX ADMIN — GABAPENTIN 400 MG: 400 CAPSULE ORAL at 15:36

## 2023-05-27 RX ADMIN — ENOXAPARIN SODIUM 30 MG: 100 INJECTION SUBCUTANEOUS at 08:50

## 2023-05-27 RX ADMIN — MORPHINE SULFATE 30 MG: 15 TABLET, FILM COATED, EXTENDED RELEASE ORAL at 08:50

## 2023-05-27 RX ADMIN — ACETAMINOPHEN 650 MG: 325 TABLET ORAL at 01:42

## 2023-05-27 RX ADMIN — OXYCODONE HYDROCHLORIDE 15 MG: 15 TABLET ORAL at 22:42

## 2023-05-27 RX ADMIN — OXYCODONE HYDROCHLORIDE 15 MG: 15 TABLET ORAL at 05:53

## 2023-05-27 RX ADMIN — BACITRACIN ZINC: 500 OINTMENT TOPICAL at 14:04

## 2023-05-27 RX ADMIN — NALOXEGOL OXALATE 25 MG: 25 TABLET, FILM COATED ORAL at 06:42

## 2023-05-27 RX ADMIN — OXYCODONE HYDROCHLORIDE 15 MG: 15 TABLET ORAL at 18:02

## 2023-05-27 RX ADMIN — MORPHINE SULFATE 30 MG: 15 TABLET, FILM COATED, EXTENDED RELEASE ORAL at 21:43

## 2023-05-27 RX ADMIN — METHOCARBAMOL 1500 MG: 750 TABLET ORAL at 13:57

## 2023-05-27 RX ADMIN — BACITRACIN ZINC, NEOMYCIN SULFATE, POLYMYXIN B SULFATE: 3.5; 5000; 4 OINTMENT TOPICAL at 08:50

## 2023-05-27 RX ADMIN — ENOXAPARIN SODIUM 30 MG: 100 INJECTION SUBCUTANEOUS at 21:42

## 2023-05-27 RX ADMIN — POLYETHYLENE GLYCOL 3350 17 G: 17 POWDER, FOR SOLUTION ORAL at 08:50

## 2023-05-27 ASSESSMENT — PAIN SCALES - GENERAL
PAINLEVEL_OUTOF10: 7
PAINLEVEL_OUTOF10: 5
PAINLEVEL_OUTOF10: 4
PAINLEVEL_OUTOF10: 8
PAINLEVEL_OUTOF10: 9
PAINLEVEL_OUTOF10: 7
PAINLEVEL_OUTOF10: 7
PAINLEVEL_OUTOF10: 4
PAINLEVEL_OUTOF10: 6
PAINLEVEL_OUTOF10: 7
PAINLEVEL_OUTOF10: 7
PAINLEVEL_OUTOF10: 6

## 2023-05-27 ASSESSMENT — PAIN DESCRIPTION - DESCRIPTORS
DESCRIPTORS: OTHER (COMMENT)
DESCRIPTORS: STABBING;THROBBING
DESCRIPTORS: STABBING;THROBBING
DESCRIPTORS: ACHING;DISCOMFORT;DULL
DESCRIPTORS: ACHING;DISCOMFORT;DULL
DESCRIPTORS: STABBING;THROBBING
DESCRIPTORS: STABBING;THROBBING
DESCRIPTORS: ACHING

## 2023-05-27 ASSESSMENT — PAIN DESCRIPTION - ORIENTATION
ORIENTATION: MID
ORIENTATION: RIGHT;LEFT
ORIENTATION: RIGHT;LEFT
ORIENTATION: MID

## 2023-05-27 ASSESSMENT — PAIN DESCRIPTION - LOCATION
LOCATION: PELVIS
LOCATION: PELVIS;BACK
LOCATION: PELVIS;BACK
LOCATION: LEG
LOCATION: LEG
LOCATION: PELVIS

## 2023-05-28 PROCEDURE — 6360000002 HC RX W HCPCS: Performed by: CLINICAL NURSE SPECIALIST

## 2023-05-28 PROCEDURE — 6370000000 HC RX 637 (ALT 250 FOR IP): Performed by: CLINICAL NURSE SPECIALIST

## 2023-05-28 PROCEDURE — 6370000000 HC RX 637 (ALT 250 FOR IP): Performed by: PHYSICAL MEDICINE & REHABILITATION

## 2023-05-28 PROCEDURE — 97530 THERAPEUTIC ACTIVITIES: CPT

## 2023-05-28 PROCEDURE — 1280000000 HC REHAB R&B

## 2023-05-28 RX ADMIN — CALCIUM CARBONATE-VITAMIN D TAB 500 MG-200 UNIT 1 TABLET: 500-200 TAB at 08:18

## 2023-05-28 RX ADMIN — OXYCODONE HYDROCHLORIDE 15 MG: 15 TABLET ORAL at 06:41

## 2023-05-28 RX ADMIN — ACETAMINOPHEN 650 MG: 325 TABLET ORAL at 02:50

## 2023-05-28 RX ADMIN — OXYCODONE HYDROCHLORIDE 15 MG: 15 TABLET ORAL at 14:40

## 2023-05-28 RX ADMIN — Medication 400 MG: at 08:17

## 2023-05-28 RX ADMIN — OXYCODONE HYDROCHLORIDE 10 MG: 5 TABLET ORAL at 02:49

## 2023-05-28 RX ADMIN — METHOCARBAMOL 1500 MG: 750 TABLET ORAL at 08:16

## 2023-05-28 RX ADMIN — GABAPENTIN 400 MG: 400 CAPSULE ORAL at 02:50

## 2023-05-28 RX ADMIN — POLYETHYLENE GLYCOL 3350 17 G: 17 POWDER, FOR SOLUTION ORAL at 08:17

## 2023-05-28 RX ADMIN — MORPHINE SULFATE 30 MG: 15 TABLET, FILM COATED, EXTENDED RELEASE ORAL at 08:17

## 2023-05-28 RX ADMIN — BACITRACIN ZINC: 500 OINTMENT TOPICAL at 14:41

## 2023-05-28 RX ADMIN — METHOCARBAMOL 1500 MG: 750 TABLET ORAL at 21:01

## 2023-05-28 RX ADMIN — BACITRACIN ZINC: 500 OINTMENT TOPICAL at 08:18

## 2023-05-28 RX ADMIN — Medication 500 MG: at 08:17

## 2023-05-28 RX ADMIN — OXYCODONE HYDROCHLORIDE 15 MG: 15 TABLET ORAL at 18:40

## 2023-05-28 RX ADMIN — GABAPENTIN 400 MG: 400 CAPSULE ORAL at 08:17

## 2023-05-28 RX ADMIN — NALOXEGOL OXALATE 25 MG: 25 TABLET, FILM COATED ORAL at 06:41

## 2023-05-28 RX ADMIN — ACETAMINOPHEN 650 MG: 325 TABLET ORAL at 08:17

## 2023-05-28 RX ADMIN — BACITRACIN ZINC, NEOMYCIN SULFATE, POLYMYXIN B SULFATE: 3.5; 5000; 4 OINTMENT TOPICAL at 08:17

## 2023-05-28 RX ADMIN — GABAPENTIN 400 MG: 400 CAPSULE ORAL at 16:02

## 2023-05-28 RX ADMIN — ENOXAPARIN SODIUM 30 MG: 100 INJECTION SUBCUTANEOUS at 08:17

## 2023-05-28 RX ADMIN — OXYCODONE HYDROCHLORIDE 15 MG: 15 TABLET ORAL at 22:40

## 2023-05-28 RX ADMIN — METHOCARBAMOL 1500 MG: 750 TABLET ORAL at 14:36

## 2023-05-28 RX ADMIN — ACETAMINOPHEN 650 MG: 325 TABLET ORAL at 21:02

## 2023-05-28 RX ADMIN — OXYCODONE HYDROCHLORIDE 15 MG: 15 TABLET ORAL at 10:40

## 2023-05-28 RX ADMIN — ENOXAPARIN SODIUM 30 MG: 100 INJECTION SUBCUTANEOUS at 21:01

## 2023-05-28 RX ADMIN — ACETAMINOPHEN 650 MG: 325 TABLET ORAL at 14:37

## 2023-05-28 RX ADMIN — MORPHINE SULFATE 30 MG: 15 TABLET, FILM COATED, EXTENDED RELEASE ORAL at 21:02

## 2023-05-28 ASSESSMENT — PAIN DESCRIPTION - LOCATION
LOCATION: PELVIS
LOCATION: PELVIS
LOCATION: LEG
LOCATION: BACK;PELVIS
LOCATION: PELVIS;BACK
LOCATION: PELVIS
LOCATION: LEG
LOCATION: PELVIS

## 2023-05-28 ASSESSMENT — PAIN SCALES - GENERAL
PAINLEVEL_OUTOF10: 4
PAINLEVEL_OUTOF10: 7
PAINLEVEL_OUTOF10: 4
PAINLEVEL_OUTOF10: 7
PAINLEVEL_OUTOF10: 6
PAINLEVEL_OUTOF10: 7

## 2023-05-28 ASSESSMENT — PAIN DESCRIPTION - DESCRIPTORS
DESCRIPTORS: OTHER (COMMENT)
DESCRIPTORS: STABBING;THROBBING
DESCRIPTORS: STABBING;THROBBING
DESCRIPTORS: ACHING;DISCOMFORT;DULL
DESCRIPTORS: ACHING;DISCOMFORT;DULL
DESCRIPTORS: OTHER (COMMENT)
DESCRIPTORS: THROBBING;STABBING
DESCRIPTORS: THROBBING;STABBING

## 2023-05-28 ASSESSMENT — PAIN DESCRIPTION - ORIENTATION
ORIENTATION: MID
ORIENTATION: RIGHT;LEFT
ORIENTATION: MID
ORIENTATION: MID
ORIENTATION: LEFT;RIGHT
ORIENTATION: MID

## 2023-05-28 ASSESSMENT — PAIN - FUNCTIONAL ASSESSMENT: PAIN_FUNCTIONAL_ASSESSMENT: ACTIVITIES ARE NOT PREVENTED

## 2023-05-29 LAB
ERYTHROCYTE [DISTWIDTH] IN BLOOD BY AUTOMATED COUNT: 14.1 FL (ref 11.5–15)
HCT VFR BLD AUTO: 32.4 % (ref 37–54)
HGB BLD-MCNC: 9.8 G/DL (ref 12.5–16.5)
MCH RBC QN AUTO: 29.1 PG (ref 26–35)
MCHC RBC AUTO-ENTMCNC: 30.2 % (ref 32–34.5)
MCV RBC AUTO: 96.1 FL (ref 80–99.9)
PLATELET # BLD AUTO: 537 E9/L (ref 130–450)
PMV BLD AUTO: 8.5 FL (ref 7–12)
RBC # BLD AUTO: 3.37 E12/L (ref 3.8–5.8)
WBC # BLD: 5.5 E9/L (ref 4.5–11.5)

## 2023-05-29 PROCEDURE — 6370000000 HC RX 637 (ALT 250 FOR IP): Performed by: CLINICAL NURSE SPECIALIST

## 2023-05-29 PROCEDURE — 85027 COMPLETE CBC AUTOMATED: CPT

## 2023-05-29 PROCEDURE — 6360000002 HC RX W HCPCS: Performed by: CLINICAL NURSE SPECIALIST

## 2023-05-29 PROCEDURE — 97530 THERAPEUTIC ACTIVITIES: CPT

## 2023-05-29 PROCEDURE — 6370000000 HC RX 637 (ALT 250 FOR IP): Performed by: PHYSICAL MEDICINE & REHABILITATION

## 2023-05-29 PROCEDURE — 36415 COLL VENOUS BLD VENIPUNCTURE: CPT

## 2023-05-29 PROCEDURE — 1280000000 HC REHAB R&B

## 2023-05-29 PROCEDURE — 97110 THERAPEUTIC EXERCISES: CPT

## 2023-05-29 RX ADMIN — POLYETHYLENE GLYCOL 3350 17 G: 17 POWDER, FOR SOLUTION ORAL at 08:54

## 2023-05-29 RX ADMIN — BACITRACIN ZINC, NEOMYCIN SULFATE, POLYMYXIN B SULFATE 1 G: 3.5; 5000; 4 OINTMENT TOPICAL at 08:54

## 2023-05-29 RX ADMIN — OXYCODONE HYDROCHLORIDE 15 MG: 15 TABLET ORAL at 02:43

## 2023-05-29 RX ADMIN — GABAPENTIN 400 MG: 400 CAPSULE ORAL at 00:04

## 2023-05-29 RX ADMIN — NALOXEGOL OXALATE 25 MG: 25 TABLET, FILM COATED ORAL at 05:58

## 2023-05-29 RX ADMIN — MORPHINE SULFATE 30 MG: 15 TABLET, FILM COATED, EXTENDED RELEASE ORAL at 08:54

## 2023-05-29 RX ADMIN — GABAPENTIN 400 MG: 400 CAPSULE ORAL at 08:54

## 2023-05-29 RX ADMIN — CALCIUM CARBONATE-VITAMIN D TAB 500 MG-200 UNIT 1 TABLET: 500-200 TAB at 08:53

## 2023-05-29 RX ADMIN — METHOCARBAMOL 1500 MG: 750 TABLET ORAL at 08:53

## 2023-05-29 RX ADMIN — BACITRACIN ZINC: 500 OINTMENT TOPICAL at 08:56

## 2023-05-29 RX ADMIN — GABAPENTIN 400 MG: 400 CAPSULE ORAL at 16:29

## 2023-05-29 RX ADMIN — ENOXAPARIN SODIUM 30 MG: 100 INJECTION SUBCUTANEOUS at 08:54

## 2023-05-29 RX ADMIN — OXYCODONE HYDROCHLORIDE 15 MG: 15 TABLET ORAL at 06:40

## 2023-05-29 RX ADMIN — OXYCODONE HYDROCHLORIDE 10 MG: 5 TABLET ORAL at 11:00

## 2023-05-29 RX ADMIN — OXYCODONE HYDROCHLORIDE 10 MG: 5 TABLET ORAL at 19:05

## 2023-05-29 RX ADMIN — ACETAMINOPHEN 650 MG: 325 TABLET ORAL at 14:26

## 2023-05-29 RX ADMIN — METHOCARBAMOL 1500 MG: 750 TABLET ORAL at 21:44

## 2023-05-29 RX ADMIN — ACETAMINOPHEN 650 MG: 325 TABLET ORAL at 08:54

## 2023-05-29 RX ADMIN — MORPHINE SULFATE 30 MG: 15 TABLET, FILM COATED, EXTENDED RELEASE ORAL at 21:43

## 2023-05-29 RX ADMIN — Medication 500 MG: at 08:53

## 2023-05-29 RX ADMIN — Medication 400 MG: at 08:53

## 2023-05-29 RX ADMIN — ENOXAPARIN SODIUM 30 MG: 100 INJECTION SUBCUTANEOUS at 21:43

## 2023-05-29 RX ADMIN — OXYCODONE HYDROCHLORIDE 15 MG: 15 TABLET ORAL at 23:05

## 2023-05-29 RX ADMIN — OXYCODONE HYDROCHLORIDE 10 MG: 5 TABLET ORAL at 15:08

## 2023-05-29 RX ADMIN — METHOCARBAMOL 1500 MG: 750 TABLET ORAL at 14:26

## 2023-05-29 RX ADMIN — ACETAMINOPHEN 650 MG: 325 TABLET ORAL at 01:55

## 2023-05-29 ASSESSMENT — PAIN DESCRIPTION - ORIENTATION
ORIENTATION: RIGHT;LEFT

## 2023-05-29 ASSESSMENT — PAIN SCALES - GENERAL
PAINLEVEL_OUTOF10: 7
PAINLEVEL_OUTOF10: 9
PAINLEVEL_OUTOF10: 9
PAINLEVEL_OUTOF10: 7

## 2023-05-29 ASSESSMENT — PAIN DESCRIPTION - LOCATION
LOCATION: LEG;PELVIS
LOCATION: LEG
LOCATION: HIP;PELVIS
LOCATION: LEG
LOCATION: HIP;LEG;PELVIS
LOCATION: OTHER (COMMENT);LEG
LOCATION: PELVIS;LEG
LOCATION: LEG
LOCATION: LEG

## 2023-05-29 ASSESSMENT — PAIN DESCRIPTION - DESCRIPTORS
DESCRIPTORS: ACHING;DISCOMFORT;DULL
DESCRIPTORS: ACHING
DESCRIPTORS: ACHING;DISCOMFORT;DULL
DESCRIPTORS: ACHING;DISCOMFORT;SORE
DESCRIPTORS: ACHING;OTHER (COMMENT)
DESCRIPTORS: ACHING;BURNING;DISCOMFORT;SORE
DESCRIPTORS: OTHER (COMMENT)
DESCRIPTORS: ACHING

## 2023-05-29 ASSESSMENT — PAIN - FUNCTIONAL ASSESSMENT: PAIN_FUNCTIONAL_ASSESSMENT: ACTIVITIES ARE NOT PREVENTED

## 2023-05-29 ASSESSMENT — PAIN SCALES - WONG BAKER: WONGBAKER_NUMERICALRESPONSE: 6

## 2023-05-30 ENCOUNTER — APPOINTMENT (OUTPATIENT)
Dept: ULTRASOUND IMAGING | Age: 38
DRG: 516 | End: 2023-05-30
Attending: PHYSICAL MEDICINE & REHABILITATION
Payer: COMMERCIAL

## 2023-05-30 PROCEDURE — 97110 THERAPEUTIC EXERCISES: CPT

## 2023-05-30 PROCEDURE — 6370000000 HC RX 637 (ALT 250 FOR IP): Performed by: PHYSICAL MEDICINE & REHABILITATION

## 2023-05-30 PROCEDURE — 97530 THERAPEUTIC ACTIVITIES: CPT

## 2023-05-30 PROCEDURE — 99233 SBSQ HOSP IP/OBS HIGH 50: CPT | Performed by: PHYSICAL MEDICINE & REHABILITATION

## 2023-05-30 PROCEDURE — 6370000000 HC RX 637 (ALT 250 FOR IP): Performed by: CLINICAL NURSE SPECIALIST

## 2023-05-30 PROCEDURE — 6360000002 HC RX W HCPCS: Performed by: CLINICAL NURSE SPECIALIST

## 2023-05-30 PROCEDURE — 76870 US EXAM SCROTUM: CPT

## 2023-05-30 PROCEDURE — 1280000000 HC REHAB R&B

## 2023-05-30 PROCEDURE — 97535 SELF CARE MNGMENT TRAINING: CPT

## 2023-05-30 RX ORDER — GABAPENTIN 300 MG/1
600 CAPSULE ORAL EVERY 8 HOURS
Status: DISCONTINUED | OUTPATIENT
Start: 2023-05-30 | End: 2023-05-31

## 2023-05-30 RX ADMIN — CALCIUM CARBONATE-VITAMIN D TAB 500 MG-200 UNIT 1 TABLET: 500-200 TAB at 08:55

## 2023-05-30 RX ADMIN — OXYCODONE HYDROCHLORIDE 15 MG: 15 TABLET ORAL at 19:37

## 2023-05-30 RX ADMIN — GABAPENTIN 400 MG: 400 CAPSULE ORAL at 00:02

## 2023-05-30 RX ADMIN — MORPHINE SULFATE 30 MG: 15 TABLET, FILM COATED, EXTENDED RELEASE ORAL at 20:32

## 2023-05-30 RX ADMIN — OXYCODONE HYDROCHLORIDE 15 MG: 15 TABLET ORAL at 23:35

## 2023-05-30 RX ADMIN — ENOXAPARIN SODIUM 30 MG: 100 INJECTION SUBCUTANEOUS at 21:46

## 2023-05-30 RX ADMIN — BACITRACIN ZINC, NEOMYCIN SULFATE, POLYMYXIN B SULFATE: 3.5; 5000; 4 OINTMENT TOPICAL at 10:20

## 2023-05-30 RX ADMIN — OXYCODONE HYDROCHLORIDE 15 MG: 15 TABLET ORAL at 11:16

## 2023-05-30 RX ADMIN — ACETAMINOPHEN 650 MG: 325 TABLET ORAL at 08:55

## 2023-05-30 RX ADMIN — OXYCODONE HYDROCHLORIDE 15 MG: 15 TABLET ORAL at 15:20

## 2023-05-30 RX ADMIN — GABAPENTIN 400 MG: 400 CAPSULE ORAL at 08:55

## 2023-05-30 RX ADMIN — METHOCARBAMOL 1500 MG: 750 TABLET ORAL at 14:09

## 2023-05-30 RX ADMIN — METHOCARBAMOL 1500 MG: 750 TABLET ORAL at 21:46

## 2023-05-30 RX ADMIN — Medication 500 MG: at 08:55

## 2023-05-30 RX ADMIN — ACETAMINOPHEN 650 MG: 325 TABLET ORAL at 21:46

## 2023-05-30 RX ADMIN — BACITRACIN ZINC: 500 OINTMENT TOPICAL at 10:19

## 2023-05-30 RX ADMIN — METHOCARBAMOL 1500 MG: 750 TABLET ORAL at 08:55

## 2023-05-30 RX ADMIN — OXYCODONE HYDROCHLORIDE 15 MG: 15 TABLET ORAL at 03:04

## 2023-05-30 RX ADMIN — ACETAMINOPHEN 650 MG: 325 TABLET ORAL at 01:59

## 2023-05-30 RX ADMIN — MORPHINE SULFATE 30 MG: 15 TABLET, FILM COATED, EXTENDED RELEASE ORAL at 08:55

## 2023-05-30 RX ADMIN — OXYCODONE HYDROCHLORIDE 15 MG: 15 TABLET ORAL at 07:09

## 2023-05-30 RX ADMIN — ENOXAPARIN SODIUM 30 MG: 100 INJECTION SUBCUTANEOUS at 08:55

## 2023-05-30 RX ADMIN — GABAPENTIN 600 MG: 300 CAPSULE ORAL at 16:56

## 2023-05-30 RX ADMIN — Medication 400 MG: at 08:55

## 2023-05-30 RX ADMIN — ACETAMINOPHEN 650 MG: 325 TABLET ORAL at 14:09

## 2023-05-30 ASSESSMENT — PAIN SCALES - GENERAL
PAINLEVEL_OUTOF10: 7
PAINLEVEL_OUTOF10: 8
PAINLEVEL_OUTOF10: 7
PAINLEVEL_OUTOF10: 8
PAINLEVEL_OUTOF10: 7

## 2023-05-30 ASSESSMENT — PAIN DESCRIPTION - DESCRIPTORS
DESCRIPTORS: OTHER (COMMENT)
DESCRIPTORS: ACHING;BURNING;DISCOMFORT
DESCRIPTORS: ACHING;DISCOMFORT;TENDER;THROBBING
DESCRIPTORS: ACHING;DISCOMFORT;DULL
DESCRIPTORS: OTHER (COMMENT)
DESCRIPTORS: ACHING;DISCOMFORT
DESCRIPTORS: OTHER (COMMENT)
DESCRIPTORS: ACHING;DISCOMFORT;DULL

## 2023-05-30 ASSESSMENT — PAIN DESCRIPTION - ORIENTATION
ORIENTATION: LEFT;RIGHT
ORIENTATION: RIGHT;LEFT

## 2023-05-30 ASSESSMENT — PAIN DESCRIPTION - LOCATION
LOCATION: PELVIS;HIP
LOCATION: LEG
LOCATION: PELVIS;HIP
LOCATION: PELVIS
LOCATION: HIP;PELVIS
LOCATION: PELVIS
LOCATION: PELVIS
LOCATION: LEG
LOCATION: LEG

## 2023-05-30 ASSESSMENT — PAIN SCALES - WONG BAKER
WONGBAKER_NUMERICALRESPONSE: 8
WONGBAKER_NUMERICALRESPONSE: 8

## 2023-05-31 PROCEDURE — 6370000000 HC RX 637 (ALT 250 FOR IP): Performed by: PHYSICAL MEDICINE & REHABILITATION

## 2023-05-31 PROCEDURE — 6370000000 HC RX 637 (ALT 250 FOR IP): Performed by: CLINICAL NURSE SPECIALIST

## 2023-05-31 PROCEDURE — 97530 THERAPEUTIC ACTIVITIES: CPT

## 2023-05-31 PROCEDURE — 1280000000 HC REHAB R&B

## 2023-05-31 PROCEDURE — 99233 SBSQ HOSP IP/OBS HIGH 50: CPT | Performed by: PHYSICAL MEDICINE & REHABILITATION

## 2023-05-31 PROCEDURE — 97110 THERAPEUTIC EXERCISES: CPT

## 2023-05-31 PROCEDURE — 6360000002 HC RX W HCPCS: Performed by: CLINICAL NURSE SPECIALIST

## 2023-05-31 RX ORDER — MORPHINE SULFATE 15 MG/1
30 TABLET, FILM COATED, EXTENDED RELEASE ORAL EVERY 8 HOURS SCHEDULED
Status: DISCONTINUED | OUTPATIENT
Start: 2023-05-31 | End: 2023-06-09

## 2023-05-31 RX ORDER — GABAPENTIN 300 MG/1
600 CAPSULE ORAL EVERY 6 HOURS SCHEDULED
Status: DISCONTINUED | OUTPATIENT
Start: 2023-05-31 | End: 2023-06-02

## 2023-05-31 RX ORDER — POLYETHYLENE GLYCOL 3350 17 G/17G
17 POWDER, FOR SOLUTION ORAL 2 TIMES DAILY
Status: DISCONTINUED | OUTPATIENT
Start: 2023-05-31 | End: 2023-06-02

## 2023-05-31 RX ADMIN — BACITRACIN ZINC, NEOMYCIN SULFATE, POLYMYXIN B SULFATE: 3.5; 5000; 4 OINTMENT TOPICAL at 07:59

## 2023-05-31 RX ADMIN — ENOXAPARIN SODIUM 30 MG: 100 INJECTION SUBCUTANEOUS at 07:55

## 2023-05-31 RX ADMIN — OXYCODONE HYDROCHLORIDE 15 MG: 15 TABLET ORAL at 12:33

## 2023-05-31 RX ADMIN — BACITRACIN ZINC, NEOMYCIN SULFATE, POLYMYXIN B SULFATE: 3.5; 5000; 4 OINTMENT TOPICAL at 20:14

## 2023-05-31 RX ADMIN — OXYCODONE HYDROCHLORIDE 15 MG: 15 TABLET ORAL at 23:37

## 2023-05-31 RX ADMIN — CALCIUM CARBONATE-VITAMIN D TAB 500 MG-200 UNIT 1 TABLET: 500-200 TAB at 07:57

## 2023-05-31 RX ADMIN — GABAPENTIN 600 MG: 300 CAPSULE ORAL at 13:36

## 2023-05-31 RX ADMIN — ACETAMINOPHEN 650 MG: 325 TABLET ORAL at 07:56

## 2023-05-31 RX ADMIN — METHOCARBAMOL 1500 MG: 750 TABLET ORAL at 13:36

## 2023-05-31 RX ADMIN — MORPHINE SULFATE 30 MG: 15 TABLET, FILM COATED, EXTENDED RELEASE ORAL at 21:52

## 2023-05-31 RX ADMIN — BACITRACIN ZINC: 500 OINTMENT TOPICAL at 13:36

## 2023-05-31 RX ADMIN — OXYCODONE HYDROCHLORIDE 15 MG: 15 TABLET ORAL at 07:56

## 2023-05-31 RX ADMIN — OXYCODONE HYDROCHLORIDE 15 MG: 15 TABLET ORAL at 18:18

## 2023-05-31 RX ADMIN — GABAPENTIN 600 MG: 300 CAPSULE ORAL at 18:18

## 2023-05-31 RX ADMIN — Medication 400 MG: at 07:57

## 2023-05-31 RX ADMIN — POLYETHYLENE GLYCOL 3350 17 G: 17 POWDER, FOR SOLUTION ORAL at 07:55

## 2023-05-31 RX ADMIN — Medication 500 MG: at 07:57

## 2023-05-31 RX ADMIN — BACITRACIN ZINC: 500 OINTMENT TOPICAL at 20:19

## 2023-05-31 RX ADMIN — BACITRACIN ZINC: 500 OINTMENT TOPICAL at 07:55

## 2023-05-31 RX ADMIN — MORPHINE SULFATE 30 MG: 15 TABLET, FILM COATED, EXTENDED RELEASE ORAL at 08:48

## 2023-05-31 RX ADMIN — ACETAMINOPHEN 650 MG: 325 TABLET ORAL at 20:12

## 2023-05-31 RX ADMIN — ENOXAPARIN SODIUM 30 MG: 100 INJECTION SUBCUTANEOUS at 20:12

## 2023-05-31 RX ADMIN — MORPHINE SULFATE 30 MG: 15 TABLET, FILM COATED, EXTENDED RELEASE ORAL at 13:36

## 2023-05-31 RX ADMIN — METHOCARBAMOL 1500 MG: 750 TABLET ORAL at 20:12

## 2023-05-31 RX ADMIN — ACETAMINOPHEN 650 MG: 325 TABLET ORAL at 13:35

## 2023-05-31 RX ADMIN — METHOCARBAMOL 1500 MG: 750 TABLET ORAL at 07:56

## 2023-05-31 RX ADMIN — OXYCODONE HYDROCHLORIDE 15 MG: 15 TABLET ORAL at 03:35

## 2023-05-31 RX ADMIN — ACETAMINOPHEN 650 MG: 325 TABLET ORAL at 03:35

## 2023-05-31 RX ADMIN — GABAPENTIN 600 MG: 300 CAPSULE ORAL at 03:38

## 2023-05-31 ASSESSMENT — PAIN SCALES - GENERAL
PAINLEVEL_OUTOF10: 6
PAINLEVEL_OUTOF10: 6
PAINLEVEL_OUTOF10: 7
PAINLEVEL_OUTOF10: 6
PAINLEVEL_OUTOF10: 7

## 2023-05-31 ASSESSMENT — PAIN DESCRIPTION - LOCATION
LOCATION: PELVIS

## 2023-05-31 ASSESSMENT — PAIN DESCRIPTION - DESCRIPTORS
DESCRIPTORS: ACHING
DESCRIPTORS: ACHING
DESCRIPTORS: ACHING;DISCOMFORT
DESCRIPTORS: ACHING
DESCRIPTORS: DISCOMFORT;ACHING

## 2023-05-31 ASSESSMENT — PAIN SCALES - WONG BAKER: WONGBAKER_NUMERICALRESPONSE: 8

## 2023-05-31 ASSESSMENT — PAIN DESCRIPTION - ORIENTATION
ORIENTATION: LOWER
ORIENTATION: RIGHT

## 2023-06-01 LAB
ANION GAP SERPL CALCULATED.3IONS-SCNC: 12 MMOL/L (ref 7–16)
BUN SERPL-MCNC: 9 MG/DL (ref 6–20)
CALCIUM SERPL-MCNC: 8.9 MG/DL (ref 8.6–10.2)
CHLORIDE SERPL-SCNC: 99 MMOL/L (ref 98–107)
CO2 SERPL-SCNC: 25 MMOL/L (ref 22–29)
CREAT SERPL-MCNC: 0.6 MG/DL (ref 0.7–1.2)
ERYTHROCYTE [DISTWIDTH] IN BLOOD BY AUTOMATED COUNT: 13.8 FL (ref 11.5–15)
GLUCOSE SERPL-MCNC: 103 MG/DL (ref 74–99)
HCT VFR BLD AUTO: 31.1 % (ref 37–54)
HGB BLD-MCNC: 10 G/DL (ref 12.5–16.5)
MAGNESIUM SERPL-MCNC: 1.9 MG/DL (ref 1.6–2.6)
MCH RBC QN AUTO: 29.5 PG (ref 26–35)
MCHC RBC AUTO-ENTMCNC: 32.2 % (ref 32–34.5)
MCV RBC AUTO: 91.7 FL (ref 80–99.9)
PLATELET # BLD AUTO: 489 E9/L (ref 130–450)
PMV BLD AUTO: 8.5 FL (ref 7–12)
POTASSIUM SERPL-SCNC: 3.5 MMOL/L (ref 3.5–5)
RBC # BLD AUTO: 3.39 E12/L (ref 3.8–5.8)
SODIUM SERPL-SCNC: 136 MMOL/L (ref 132–146)
WBC # BLD: 6.4 E9/L (ref 4.5–11.5)

## 2023-06-01 PROCEDURE — 6370000000 HC RX 637 (ALT 250 FOR IP): Performed by: CLINICAL NURSE SPECIALIST

## 2023-06-01 PROCEDURE — 6370000000 HC RX 637 (ALT 250 FOR IP): Performed by: PHYSICAL MEDICINE & REHABILITATION

## 2023-06-01 PROCEDURE — 97530 THERAPEUTIC ACTIVITIES: CPT

## 2023-06-01 PROCEDURE — 1280000000 HC REHAB R&B

## 2023-06-01 PROCEDURE — 6360000002 HC RX W HCPCS: Performed by: CLINICAL NURSE SPECIALIST

## 2023-06-01 PROCEDURE — 97166 OT EVAL MOD COMPLEX 45 MIN: CPT

## 2023-06-01 PROCEDURE — 85027 COMPLETE CBC AUTOMATED: CPT

## 2023-06-01 PROCEDURE — 80048 BASIC METABOLIC PNL TOTAL CA: CPT

## 2023-06-01 PROCEDURE — 97535 SELF CARE MNGMENT TRAINING: CPT

## 2023-06-01 PROCEDURE — 83735 ASSAY OF MAGNESIUM: CPT

## 2023-06-01 PROCEDURE — 97110 THERAPEUTIC EXERCISES: CPT

## 2023-06-01 PROCEDURE — 36415 COLL VENOUS BLD VENIPUNCTURE: CPT

## 2023-06-01 PROCEDURE — 99233 SBSQ HOSP IP/OBS HIGH 50: CPT | Performed by: PHYSICAL MEDICINE & REHABILITATION

## 2023-06-01 RX ADMIN — OXYCODONE HYDROCHLORIDE 15 MG: 15 TABLET ORAL at 17:42

## 2023-06-01 RX ADMIN — MORPHINE SULFATE 30 MG: 15 TABLET, FILM COATED, EXTENDED RELEASE ORAL at 13:57

## 2023-06-01 RX ADMIN — BACITRACIN ZINC, NEOMYCIN SULFATE, POLYMYXIN B SULFATE: 3.5; 5000; 4 OINTMENT TOPICAL at 08:08

## 2023-06-01 RX ADMIN — Medication 400 MG: at 08:09

## 2023-06-01 RX ADMIN — BACITRACIN ZINC: 500 OINTMENT TOPICAL at 08:09

## 2023-06-01 RX ADMIN — CALCIUM CARBONATE-VITAMIN D TAB 500 MG-200 UNIT 1 TABLET: 500-200 TAB at 08:09

## 2023-06-01 RX ADMIN — GABAPENTIN 600 MG: 300 CAPSULE ORAL at 17:42

## 2023-06-01 RX ADMIN — OXYCODONE HYDROCHLORIDE 15 MG: 15 TABLET ORAL at 08:09

## 2023-06-01 RX ADMIN — ACETAMINOPHEN 650 MG: 325 TABLET ORAL at 08:09

## 2023-06-01 RX ADMIN — ENOXAPARIN SODIUM 30 MG: 100 INJECTION SUBCUTANEOUS at 08:11

## 2023-06-01 RX ADMIN — ENOXAPARIN SODIUM 30 MG: 100 INJECTION SUBCUTANEOUS at 21:20

## 2023-06-01 RX ADMIN — GABAPENTIN 600 MG: 300 CAPSULE ORAL at 12:06

## 2023-06-01 RX ADMIN — METHOCARBAMOL 1500 MG: 750 TABLET ORAL at 21:16

## 2023-06-01 RX ADMIN — GABAPENTIN 600 MG: 300 CAPSULE ORAL at 00:04

## 2023-06-01 RX ADMIN — OXYCODONE HYDROCHLORIDE 15 MG: 15 TABLET ORAL at 12:59

## 2023-06-01 RX ADMIN — METHOCARBAMOL 1500 MG: 750 TABLET ORAL at 13:57

## 2023-06-01 RX ADMIN — ACETAMINOPHEN 650 MG: 325 TABLET ORAL at 13:57

## 2023-06-01 RX ADMIN — GABAPENTIN 600 MG: 300 CAPSULE ORAL at 23:56

## 2023-06-01 RX ADMIN — POLYETHYLENE GLYCOL 3350 17 G: 17 POWDER, FOR SOLUTION ORAL at 08:09

## 2023-06-01 RX ADMIN — ACETAMINOPHEN 650 MG: 325 TABLET ORAL at 01:50

## 2023-06-01 RX ADMIN — METHOCARBAMOL 1500 MG: 750 TABLET ORAL at 08:09

## 2023-06-01 RX ADMIN — MORPHINE SULFATE 30 MG: 15 TABLET, FILM COATED, EXTENDED RELEASE ORAL at 05:50

## 2023-06-01 RX ADMIN — Medication 500 MG: at 08:09

## 2023-06-01 RX ADMIN — POLYETHYLENE GLYCOL 3350 17 G: 17 POWDER, FOR SOLUTION ORAL at 21:15

## 2023-06-01 RX ADMIN — GABAPENTIN 600 MG: 300 CAPSULE ORAL at 05:50

## 2023-06-01 RX ADMIN — ACETAMINOPHEN 650 MG: 325 TABLET ORAL at 21:13

## 2023-06-01 RX ADMIN — MORPHINE SULFATE 30 MG: 15 TABLET, FILM COATED, EXTENDED RELEASE ORAL at 21:16

## 2023-06-01 ASSESSMENT — PAIN DESCRIPTION - ORIENTATION
ORIENTATION: MID
ORIENTATION: RIGHT;LEFT
ORIENTATION: MID
ORIENTATION: MID

## 2023-06-01 ASSESSMENT — PAIN DESCRIPTION - DESCRIPTORS
DESCRIPTORS: STABBING;THROBBING
DESCRIPTORS: DULL
DESCRIPTORS: THROBBING;STABBING
DESCRIPTORS: STABBING;THROBBING

## 2023-06-01 ASSESSMENT — PAIN SCALES - GENERAL
PAINLEVEL_OUTOF10: 6
PAINLEVEL_OUTOF10: 7
PAINLEVEL_OUTOF10: 4
PAINLEVEL_OUTOF10: 7
PAINLEVEL_OUTOF10: 7
PAINLEVEL_OUTOF10: 8
PAINLEVEL_OUTOF10: 7
PAINLEVEL_OUTOF10: 4

## 2023-06-01 ASSESSMENT — PAIN DESCRIPTION - LOCATION
LOCATION: PELVIS
LOCATION: PELVIS
LOCATION: PELVIS;BUTTOCKS
LOCATION: PELVIS

## 2023-06-01 ASSESSMENT — PAIN - FUNCTIONAL ASSESSMENT: PAIN_FUNCTIONAL_ASSESSMENT: ACTIVITIES ARE NOT PREVENTED

## 2023-06-02 ENCOUNTER — APPOINTMENT (OUTPATIENT)
Dept: GENERAL RADIOLOGY | Age: 38
DRG: 516 | End: 2023-06-02
Attending: PHYSICAL MEDICINE & REHABILITATION
Payer: COMMERCIAL

## 2023-06-02 DIAGNOSIS — S22.000D COMPRESSION FRACTURE OF THORACIC VERTEBRA WITH ROUTINE HEALING, UNSPECIFIED THORACIC VERTEBRAL LEVEL, SUBSEQUENT ENCOUNTER: Primary | ICD-10-CM

## 2023-06-02 PROCEDURE — 97530 THERAPEUTIC ACTIVITIES: CPT

## 2023-06-02 PROCEDURE — 6370000000 HC RX 637 (ALT 250 FOR IP): Performed by: CLINICAL NURSE SPECIALIST

## 2023-06-02 PROCEDURE — 99233 SBSQ HOSP IP/OBS HIGH 50: CPT | Performed by: PHYSICAL MEDICINE & REHABILITATION

## 2023-06-02 PROCEDURE — 1280000000 HC REHAB R&B

## 2023-06-02 PROCEDURE — 6370000000 HC RX 637 (ALT 250 FOR IP): Performed by: PHYSICAL MEDICINE & REHABILITATION

## 2023-06-02 PROCEDURE — 97535 SELF CARE MNGMENT TRAINING: CPT

## 2023-06-02 PROCEDURE — 97110 THERAPEUTIC EXERCISES: CPT

## 2023-06-02 PROCEDURE — 72070 X-RAY EXAM THORAC SPINE 2VWS: CPT

## 2023-06-02 PROCEDURE — 99232 SBSQ HOSP IP/OBS MODERATE 35: CPT | Performed by: NEUROLOGICAL SURGERY

## 2023-06-02 PROCEDURE — 6360000002 HC RX W HCPCS: Performed by: CLINICAL NURSE SPECIALIST

## 2023-06-02 RX ORDER — POLYETHYLENE GLYCOL 3350 17 G/17G
17 POWDER, FOR SOLUTION ORAL 2 TIMES DAILY
Status: DISCONTINUED | OUTPATIENT
Start: 2023-06-03 | End: 2023-06-16 | Stop reason: HOSPADM

## 2023-06-02 RX ORDER — GABAPENTIN 400 MG/1
800 CAPSULE ORAL EVERY 6 HOURS SCHEDULED
Status: DISCONTINUED | OUTPATIENT
Start: 2023-06-02 | End: 2023-06-05

## 2023-06-02 RX ADMIN — ACETAMINOPHEN 650 MG: 325 TABLET ORAL at 21:38

## 2023-06-02 RX ADMIN — GABAPENTIN 600 MG: 300 CAPSULE ORAL at 06:29

## 2023-06-02 RX ADMIN — BACITRACIN ZINC, NEOMYCIN SULFATE, POLYMYXIN B SULFATE: 3.5; 5000; 4 OINTMENT TOPICAL at 21:42

## 2023-06-02 RX ADMIN — ENOXAPARIN SODIUM 30 MG: 100 INJECTION SUBCUTANEOUS at 09:01

## 2023-06-02 RX ADMIN — METHOCARBAMOL 1500 MG: 750 TABLET ORAL at 13:59

## 2023-06-02 RX ADMIN — ACETAMINOPHEN 650 MG: 325 TABLET ORAL at 13:59

## 2023-06-02 RX ADMIN — BACITRACIN ZINC, NEOMYCIN SULFATE, POLYMYXIN B SULFATE: 3.5; 5000; 4 OINTMENT TOPICAL at 09:03

## 2023-06-02 RX ADMIN — OXYCODONE HYDROCHLORIDE 15 MG: 15 TABLET ORAL at 09:01

## 2023-06-02 RX ADMIN — ACETAMINOPHEN 650 MG: 325 TABLET ORAL at 09:01

## 2023-06-02 RX ADMIN — Medication 400 MG: at 09:01

## 2023-06-02 RX ADMIN — MORPHINE SULFATE 30 MG: 15 TABLET, FILM COATED, EXTENDED RELEASE ORAL at 13:59

## 2023-06-02 RX ADMIN — POLYETHYLENE GLYCOL 3350 17 G: 17 POWDER, FOR SOLUTION ORAL at 09:01

## 2023-06-02 RX ADMIN — GABAPENTIN 800 MG: 400 CAPSULE ORAL at 18:00

## 2023-06-02 RX ADMIN — OXYCODONE HYDROCHLORIDE 15 MG: 15 TABLET ORAL at 17:02

## 2023-06-02 RX ADMIN — METHOCARBAMOL 1500 MG: 750 TABLET ORAL at 21:39

## 2023-06-02 RX ADMIN — MORPHINE SULFATE 30 MG: 15 TABLET, FILM COATED, EXTENDED RELEASE ORAL at 06:29

## 2023-06-02 RX ADMIN — BACITRACIN ZINC: 500 OINTMENT TOPICAL at 21:42

## 2023-06-02 RX ADMIN — BACITRACIN ZINC: 500 OINTMENT TOPICAL at 09:02

## 2023-06-02 RX ADMIN — METHOCARBAMOL 1500 MG: 750 TABLET ORAL at 09:01

## 2023-06-02 RX ADMIN — OXYCODONE HYDROCHLORIDE 15 MG: 15 TABLET ORAL at 13:05

## 2023-06-02 RX ADMIN — ACETAMINOPHEN 650 MG: 325 TABLET ORAL at 01:56

## 2023-06-02 RX ADMIN — OXYCODONE HYDROCHLORIDE 10 MG: 5 TABLET ORAL at 00:00

## 2023-06-02 RX ADMIN — ENOXAPARIN SODIUM 30 MG: 100 INJECTION SUBCUTANEOUS at 21:42

## 2023-06-02 RX ADMIN — MORPHINE SULFATE 30 MG: 15 TABLET, FILM COATED, EXTENDED RELEASE ORAL at 21:39

## 2023-06-02 RX ADMIN — GABAPENTIN 600 MG: 300 CAPSULE ORAL at 12:16

## 2023-06-02 RX ADMIN — Medication 500 MG: at 09:01

## 2023-06-02 RX ADMIN — CALCIUM CARBONATE-VITAMIN D TAB 500 MG-200 UNIT 1 TABLET: 500-200 TAB at 09:02

## 2023-06-02 ASSESSMENT — PAIN DESCRIPTION - ORIENTATION
ORIENTATION: LEFT;MID
ORIENTATION: RIGHT;LEFT
ORIENTATION: LEFT;MID
ORIENTATION: RIGHT;LEFT
ORIENTATION: MID;LEFT
ORIENTATION: RIGHT;LEFT

## 2023-06-02 ASSESSMENT — PAIN SCALES - GENERAL
PAINLEVEL_OUTOF10: 7
PAINLEVEL_OUTOF10: 5
PAINLEVEL_OUTOF10: 7
PAINLEVEL_OUTOF10: 6
PAINLEVEL_OUTOF10: 0
PAINLEVEL_OUTOF10: 6
PAINLEVEL_OUTOF10: 8
PAINLEVEL_OUTOF10: 6
PAINLEVEL_OUTOF10: 7
PAINLEVEL_OUTOF10: 5

## 2023-06-02 ASSESSMENT — PAIN DESCRIPTION - LOCATION
LOCATION: PELVIS;LEG
LOCATION: PELVIS;LEG
LOCATION: PELVIS;FOOT
LOCATION: PELVIS;LEG

## 2023-06-02 ASSESSMENT — PAIN DESCRIPTION - DESCRIPTORS
DESCRIPTORS: STABBING;THROBBING;TINGLING
DESCRIPTORS: ACHING;OTHER (COMMENT)
DESCRIPTORS: ACHING
DESCRIPTORS: THROBBING;STABBING;TINGLING

## 2023-06-02 ASSESSMENT — PAIN - FUNCTIONAL ASSESSMENT
PAIN_FUNCTIONAL_ASSESSMENT: ACTIVITIES ARE NOT PREVENTED

## 2023-06-03 PROCEDURE — 97530 THERAPEUTIC ACTIVITIES: CPT

## 2023-06-03 PROCEDURE — 6370000000 HC RX 637 (ALT 250 FOR IP): Performed by: PHYSICAL MEDICINE & REHABILITATION

## 2023-06-03 PROCEDURE — 1280000000 HC REHAB R&B

## 2023-06-03 PROCEDURE — 6370000000 HC RX 637 (ALT 250 FOR IP): Performed by: CLINICAL NURSE SPECIALIST

## 2023-06-03 PROCEDURE — 6360000002 HC RX W HCPCS: Performed by: CLINICAL NURSE SPECIALIST

## 2023-06-03 RX ADMIN — ACETAMINOPHEN 650 MG: 325 TABLET ORAL at 14:19

## 2023-06-03 RX ADMIN — ENOXAPARIN SODIUM 30 MG: 100 INJECTION SUBCUTANEOUS at 21:46

## 2023-06-03 RX ADMIN — ACETAMINOPHEN 650 MG: 325 TABLET ORAL at 02:00

## 2023-06-03 RX ADMIN — BACITRACIN ZINC, NEOMYCIN SULFATE, POLYMYXIN B SULFATE: 3.5; 5000; 4 OINTMENT TOPICAL at 08:22

## 2023-06-03 RX ADMIN — POLYETHYLENE GLYCOL 3350 17 GRAM ORAL POWDER PACKET 17 G: at 08:23

## 2023-06-03 RX ADMIN — ACETAMINOPHEN 650 MG: 325 TABLET ORAL at 21:51

## 2023-06-03 RX ADMIN — GABAPENTIN 800 MG: 400 CAPSULE ORAL at 00:14

## 2023-06-03 RX ADMIN — METHOCARBAMOL 1500 MG: 750 TABLET ORAL at 14:19

## 2023-06-03 RX ADMIN — BACITRACIN ZINC: 500 OINTMENT TOPICAL at 08:23

## 2023-06-03 RX ADMIN — MORPHINE SULFATE 30 MG: 15 TABLET, FILM COATED, EXTENDED RELEASE ORAL at 06:24

## 2023-06-03 RX ADMIN — MORPHINE SULFATE 30 MG: 15 TABLET, FILM COATED, EXTENDED RELEASE ORAL at 21:46

## 2023-06-03 RX ADMIN — OXYCODONE HYDROCHLORIDE 15 MG: 15 TABLET ORAL at 18:47

## 2023-06-03 RX ADMIN — GABAPENTIN 800 MG: 400 CAPSULE ORAL at 17:24

## 2023-06-03 RX ADMIN — Medication 400 MG: at 08:09

## 2023-06-03 RX ADMIN — MORPHINE SULFATE 30 MG: 15 TABLET, FILM COATED, EXTENDED RELEASE ORAL at 14:19

## 2023-06-03 RX ADMIN — GABAPENTIN 800 MG: 400 CAPSULE ORAL at 11:51

## 2023-06-03 RX ADMIN — CALCIUM CARBONATE-VITAMIN D TAB 500 MG-200 UNIT 1 TABLET: 500-200 TAB at 08:09

## 2023-06-03 RX ADMIN — GABAPENTIN 800 MG: 400 CAPSULE ORAL at 06:24

## 2023-06-03 RX ADMIN — ENOXAPARIN SODIUM 30 MG: 100 INJECTION SUBCUTANEOUS at 08:09

## 2023-06-03 RX ADMIN — METHOCARBAMOL 1500 MG: 750 TABLET ORAL at 21:45

## 2023-06-03 RX ADMIN — Medication 500 MG: at 08:09

## 2023-06-03 RX ADMIN — OXYCODONE HYDROCHLORIDE 15 MG: 15 TABLET ORAL at 08:13

## 2023-06-03 RX ADMIN — METHOCARBAMOL 1500 MG: 750 TABLET ORAL at 08:09

## 2023-06-03 RX ADMIN — ACETAMINOPHEN 650 MG: 325 TABLET ORAL at 08:16

## 2023-06-03 ASSESSMENT — PAIN DESCRIPTION - LOCATION
LOCATION: LEG
LOCATION: PELVIS
LOCATION: BACK;INCISION;HIP
LOCATION: LEG
LOCATION: PELVIS

## 2023-06-03 ASSESSMENT — PAIN DESCRIPTION - DESCRIPTORS
DESCRIPTORS: OTHER (COMMENT)
DESCRIPTORS: DISCOMFORT;THROBBING;TENDER
DESCRIPTORS: OTHER (COMMENT)

## 2023-06-03 ASSESSMENT — PAIN SCALES - WONG BAKER: WONGBAKER_NUMERICALRESPONSE: 8

## 2023-06-03 ASSESSMENT — PAIN SCALES - GENERAL
PAINLEVEL_OUTOF10: 7
PAINLEVEL_OUTOF10: 8
PAINLEVEL_OUTOF10: 7
PAINLEVEL_OUTOF10: 7
PAINLEVEL_OUTOF10: 9

## 2023-06-03 ASSESSMENT — PAIN DESCRIPTION - ORIENTATION
ORIENTATION: LEFT;RIGHT
ORIENTATION: LEFT;RIGHT;LOWER
ORIENTATION: RIGHT;LEFT
ORIENTATION: LEFT;RIGHT
ORIENTATION: LEFT;RIGHT

## 2023-06-04 ENCOUNTER — APPOINTMENT (OUTPATIENT)
Dept: ULTRASOUND IMAGING | Age: 38
DRG: 516 | End: 2023-06-04
Attending: PHYSICAL MEDICINE & REHABILITATION
Payer: COMMERCIAL

## 2023-06-04 PROCEDURE — 97530 THERAPEUTIC ACTIVITIES: CPT

## 2023-06-04 PROCEDURE — 1280000000 HC REHAB R&B

## 2023-06-04 PROCEDURE — 6370000000 HC RX 637 (ALT 250 FOR IP): Performed by: PHYSICAL MEDICINE & REHABILITATION

## 2023-06-04 PROCEDURE — 6360000002 HC RX W HCPCS: Performed by: CLINICAL NURSE SPECIALIST

## 2023-06-04 PROCEDURE — 97110 THERAPEUTIC EXERCISES: CPT

## 2023-06-04 PROCEDURE — 93971 EXTREMITY STUDY: CPT

## 2023-06-04 PROCEDURE — 6370000000 HC RX 637 (ALT 250 FOR IP): Performed by: CLINICAL NURSE SPECIALIST

## 2023-06-04 PROCEDURE — 97535 SELF CARE MNGMENT TRAINING: CPT

## 2023-06-04 RX ADMIN — GABAPENTIN 800 MG: 400 CAPSULE ORAL at 06:01

## 2023-06-04 RX ADMIN — BACITRACIN ZINC: 500 OINTMENT TOPICAL at 08:23

## 2023-06-04 RX ADMIN — GABAPENTIN 800 MG: 400 CAPSULE ORAL at 18:00

## 2023-06-04 RX ADMIN — MORPHINE SULFATE 30 MG: 15 TABLET, FILM COATED, EXTENDED RELEASE ORAL at 06:01

## 2023-06-04 RX ADMIN — OXYCODONE HYDROCHLORIDE 15 MG: 15 TABLET ORAL at 08:22

## 2023-06-04 RX ADMIN — ACETAMINOPHEN 650 MG: 325 TABLET ORAL at 02:07

## 2023-06-04 RX ADMIN — Medication 500 MG: at 08:22

## 2023-06-04 RX ADMIN — ACETAMINOPHEN 650 MG: 325 TABLET ORAL at 08:22

## 2023-06-04 RX ADMIN — GABAPENTIN 800 MG: 400 CAPSULE ORAL at 00:00

## 2023-06-04 RX ADMIN — ENOXAPARIN SODIUM 30 MG: 100 INJECTION SUBCUTANEOUS at 08:22

## 2023-06-04 RX ADMIN — MORPHINE SULFATE 30 MG: 15 TABLET, FILM COATED, EXTENDED RELEASE ORAL at 22:06

## 2023-06-04 RX ADMIN — GABAPENTIN 800 MG: 400 CAPSULE ORAL at 11:59

## 2023-06-04 RX ADMIN — Medication 400 MG: at 08:22

## 2023-06-04 RX ADMIN — ENOXAPARIN SODIUM 30 MG: 100 INJECTION SUBCUTANEOUS at 22:05

## 2023-06-04 RX ADMIN — MORPHINE SULFATE 30 MG: 15 TABLET, FILM COATED, EXTENDED RELEASE ORAL at 14:04

## 2023-06-04 RX ADMIN — METHOCARBAMOL 1500 MG: 750 TABLET ORAL at 14:03

## 2023-06-04 RX ADMIN — BACITRACIN ZINC, NEOMYCIN SULFATE, POLYMYXIN B SULFATE: 3.5; 5000; 4 OINTMENT TOPICAL at 08:23

## 2023-06-04 RX ADMIN — OXYCODONE HYDROCHLORIDE 10 MG: 5 TABLET ORAL at 18:02

## 2023-06-04 RX ADMIN — ACETAMINOPHEN 650 MG: 325 TABLET ORAL at 20:15

## 2023-06-04 RX ADMIN — METHOCARBAMOL 1500 MG: 750 TABLET ORAL at 08:22

## 2023-06-04 RX ADMIN — OXYCODONE HYDROCHLORIDE 15 MG: 15 TABLET ORAL at 22:07

## 2023-06-04 RX ADMIN — CALCIUM CARBONATE-VITAMIN D TAB 500 MG-200 UNIT 1 TABLET: 500-200 TAB at 08:22

## 2023-06-04 RX ADMIN — METHOCARBAMOL 1500 MG: 750 TABLET ORAL at 22:05

## 2023-06-04 RX ADMIN — ACETAMINOPHEN 650 MG: 325 TABLET ORAL at 14:03

## 2023-06-04 RX ADMIN — POLYETHYLENE GLYCOL 3350 17 GRAM ORAL POWDER PACKET 17 G: at 08:22

## 2023-06-04 RX ADMIN — OXYCODONE HYDROCHLORIDE 10 MG: 5 TABLET ORAL at 00:04

## 2023-06-04 ASSESSMENT — PAIN DESCRIPTION - DESCRIPTORS
DESCRIPTORS: TINGLING;OTHER (COMMENT)
DESCRIPTORS: OTHER (COMMENT)
DESCRIPTORS: OTHER (COMMENT)
DESCRIPTORS: ACHING;DISCOMFORT;GNAWING;SORE
DESCRIPTORS: OTHER (COMMENT)

## 2023-06-04 ASSESSMENT — PAIN DESCRIPTION - LOCATION
LOCATION: PELVIS;LEG
LOCATION: LEG
LOCATION: LEG
LOCATION: TOE (COMMENT WHICH ONE);FOOT
LOCATION: LEG
LOCATION: PELVIS;LEG
LOCATION: PELVIS
LOCATION: PELVIS
LOCATION: LEG

## 2023-06-04 ASSESSMENT — PAIN DESCRIPTION - ORIENTATION
ORIENTATION: RIGHT;LEFT
ORIENTATION: LEFT;RIGHT
ORIENTATION: LEFT
ORIENTATION: RIGHT;LEFT
ORIENTATION: RIGHT;LEFT
ORIENTATION: POSTERIOR
ORIENTATION: RIGHT;LEFT
ORIENTATION: LEFT;RIGHT
ORIENTATION: LEFT;RIGHT

## 2023-06-04 ASSESSMENT — PAIN SCALES - GENERAL
PAINLEVEL_OUTOF10: 6
PAINLEVEL_OUTOF10: 7
PAINLEVEL_OUTOF10: 6
PAINLEVEL_OUTOF10: 8
PAINLEVEL_OUTOF10: 7
PAINLEVEL_OUTOF10: 8
PAINLEVEL_OUTOF10: 7
PAINLEVEL_OUTOF10: 7
PAINLEVEL_OUTOF10: 6

## 2023-06-04 ASSESSMENT — PAIN - FUNCTIONAL ASSESSMENT
PAIN_FUNCTIONAL_ASSESSMENT: ACTIVITIES ARE NOT PREVENTED

## 2023-06-04 ASSESSMENT — PAIN SCALES - WONG BAKER: WONGBAKER_NUMERICALRESPONSE: 6

## 2023-06-05 LAB
ERYTHROCYTE [DISTWIDTH] IN BLOOD BY AUTOMATED COUNT: 13.7 FL (ref 11.5–15)
HCT VFR BLD AUTO: 32.4 % (ref 37–54)
HGB BLD-MCNC: 9.8 G/DL (ref 12.5–16.5)
MCH RBC QN AUTO: 28.7 PG (ref 26–35)
MCHC RBC AUTO-ENTMCNC: 30.2 % (ref 32–34.5)
MCV RBC AUTO: 94.7 FL (ref 80–99.9)
PLATELET # BLD AUTO: 467 E9/L (ref 130–450)
PMV BLD AUTO: 8.5 FL (ref 7–12)
RBC # BLD AUTO: 3.42 E12/L (ref 3.8–5.8)
WBC # BLD: 4.3 E9/L (ref 4.5–11.5)

## 2023-06-05 PROCEDURE — 6370000000 HC RX 637 (ALT 250 FOR IP): Performed by: CLINICAL NURSE SPECIALIST

## 2023-06-05 PROCEDURE — 97530 THERAPEUTIC ACTIVITIES: CPT

## 2023-06-05 PROCEDURE — 6370000000 HC RX 637 (ALT 250 FOR IP): Performed by: PHYSICAL MEDICINE & REHABILITATION

## 2023-06-05 PROCEDURE — 85027 COMPLETE CBC AUTOMATED: CPT

## 2023-06-05 PROCEDURE — 97110 THERAPEUTIC EXERCISES: CPT

## 2023-06-05 PROCEDURE — 6360000002 HC RX W HCPCS: Performed by: CLINICAL NURSE SPECIALIST

## 2023-06-05 PROCEDURE — 1280000000 HC REHAB R&B

## 2023-06-05 PROCEDURE — 97535 SELF CARE MNGMENT TRAINING: CPT

## 2023-06-05 PROCEDURE — 36415 COLL VENOUS BLD VENIPUNCTURE: CPT

## 2023-06-05 RX ORDER — PREGABALIN 100 MG/1
200 CAPSULE ORAL 2 TIMES DAILY
Status: DISCONTINUED | OUTPATIENT
Start: 2023-06-05 | End: 2023-06-06

## 2023-06-05 RX ORDER — LIDOCAINE 4 G/G
1 PATCH TOPICAL DAILY
Status: DISCONTINUED | OUTPATIENT
Start: 2023-06-05 | End: 2023-06-14

## 2023-06-05 RX ADMIN — PREGABALIN 200 MG: 100 CAPSULE ORAL at 22:28

## 2023-06-05 RX ADMIN — OXYCODONE HYDROCHLORIDE 15 MG: 15 TABLET ORAL at 09:28

## 2023-06-05 RX ADMIN — BACITRACIN ZINC: 500 OINTMENT TOPICAL at 08:11

## 2023-06-05 RX ADMIN — ACETAMINOPHEN 650 MG: 325 TABLET ORAL at 22:24

## 2023-06-05 RX ADMIN — ACETAMINOPHEN 650 MG: 325 TABLET ORAL at 13:59

## 2023-06-05 RX ADMIN — OXYCODONE HYDROCHLORIDE 15 MG: 15 TABLET ORAL at 17:29

## 2023-06-05 RX ADMIN — POLYETHYLENE GLYCOL 3350 17 GRAM ORAL POWDER PACKET 17 G: at 08:10

## 2023-06-05 RX ADMIN — METHOCARBAMOL 1500 MG: 750 TABLET ORAL at 22:24

## 2023-06-05 RX ADMIN — MORPHINE SULFATE 30 MG: 15 TABLET, FILM COATED, EXTENDED RELEASE ORAL at 05:54

## 2023-06-05 RX ADMIN — Medication 500 MG: at 08:10

## 2023-06-05 RX ADMIN — OXYCODONE HYDROCHLORIDE 15 MG: 15 TABLET ORAL at 05:12

## 2023-06-05 RX ADMIN — ACETAMINOPHEN 650 MG: 325 TABLET ORAL at 08:10

## 2023-06-05 RX ADMIN — MORPHINE SULFATE 30 MG: 15 TABLET, FILM COATED, EXTENDED RELEASE ORAL at 22:24

## 2023-06-05 RX ADMIN — METHOCARBAMOL 1500 MG: 750 TABLET ORAL at 13:59

## 2023-06-05 RX ADMIN — BACITRACIN ZINC, NEOMYCIN SULFATE, POLYMYXIN B SULFATE 1 G: 3.5; 5000; 4 OINTMENT TOPICAL at 08:11

## 2023-06-05 RX ADMIN — GABAPENTIN 800 MG: 400 CAPSULE ORAL at 00:17

## 2023-06-05 RX ADMIN — PREGABALIN 200 MG: 100 CAPSULE ORAL at 11:26

## 2023-06-05 RX ADMIN — ENOXAPARIN SODIUM 30 MG: 100 INJECTION SUBCUTANEOUS at 08:17

## 2023-06-05 RX ADMIN — OXYCODONE HYDROCHLORIDE 15 MG: 15 TABLET ORAL at 13:30

## 2023-06-05 RX ADMIN — ENOXAPARIN SODIUM 30 MG: 100 INJECTION SUBCUTANEOUS at 22:25

## 2023-06-05 RX ADMIN — CALCIUM CARBONATE-VITAMIN D TAB 500 MG-200 UNIT 1 TABLET: 500-200 TAB at 08:12

## 2023-06-05 RX ADMIN — MORPHINE SULFATE 30 MG: 15 TABLET, FILM COATED, EXTENDED RELEASE ORAL at 13:59

## 2023-06-05 RX ADMIN — ACETAMINOPHEN 650 MG: 325 TABLET ORAL at 02:08

## 2023-06-05 RX ADMIN — GABAPENTIN 800 MG: 400 CAPSULE ORAL at 05:54

## 2023-06-05 RX ADMIN — Medication 400 MG: at 08:10

## 2023-06-05 RX ADMIN — METHOCARBAMOL 1500 MG: 750 TABLET ORAL at 08:10

## 2023-06-05 ASSESSMENT — PAIN DESCRIPTION - DESCRIPTORS
DESCRIPTORS: THROBBING;STABBING;TINGLING
DESCRIPTORS: THROBBING;ACHING
DESCRIPTORS: THROBBING;STABBING;TINGLING
DESCRIPTORS: OTHER (COMMENT)
DESCRIPTORS: THROBBING;ACHING
DESCRIPTORS: THROBBING;STABBING;TINGLING

## 2023-06-05 ASSESSMENT — PAIN DESCRIPTION - ORIENTATION
ORIENTATION: RIGHT;LEFT
ORIENTATION: LEFT;MID
ORIENTATION: LEFT;MID
ORIENTATION: RIGHT;LEFT
ORIENTATION: RIGHT;LEFT
ORIENTATION: LEFT;MID
ORIENTATION: MID

## 2023-06-05 ASSESSMENT — PAIN - FUNCTIONAL ASSESSMENT
PAIN_FUNCTIONAL_ASSESSMENT: PREVENTS OR INTERFERES SOME ACTIVE ACTIVITIES AND ADLS
PAIN_FUNCTIONAL_ASSESSMENT: ACTIVITIES ARE NOT PREVENTED

## 2023-06-05 ASSESSMENT — PAIN DESCRIPTION - LOCATION
LOCATION: LEG;PELVIS
LOCATION: PELVIS;FOOT
LOCATION: PELVIS;LEG
LOCATION: PELVIS;LEG

## 2023-06-05 ASSESSMENT — PAIN SCALES - GENERAL
PAINLEVEL_OUTOF10: 10
PAINLEVEL_OUTOF10: 8
PAINLEVEL_OUTOF10: 8
PAINLEVEL_OUTOF10: 5
PAINLEVEL_OUTOF10: 6
PAINLEVEL_OUTOF10: 9
PAINLEVEL_OUTOF10: 7
PAINLEVEL_OUTOF10: 8
PAINLEVEL_OUTOF10: 7
PAINLEVEL_OUTOF10: 7

## 2023-06-06 PROCEDURE — 97110 THERAPEUTIC EXERCISES: CPT

## 2023-06-06 PROCEDURE — 97535 SELF CARE MNGMENT TRAINING: CPT

## 2023-06-06 PROCEDURE — 1280000000 HC REHAB R&B

## 2023-06-06 PROCEDURE — 6370000000 HC RX 637 (ALT 250 FOR IP): Performed by: PHYSICAL MEDICINE & REHABILITATION

## 2023-06-06 PROCEDURE — 6360000002 HC RX W HCPCS: Performed by: CLINICAL NURSE SPECIALIST

## 2023-06-06 PROCEDURE — 6370000000 HC RX 637 (ALT 250 FOR IP): Performed by: CLINICAL NURSE SPECIALIST

## 2023-06-06 PROCEDURE — 97530 THERAPEUTIC ACTIVITIES: CPT

## 2023-06-06 RX ORDER — PREGABALIN 100 MG/1
200 CAPSULE ORAL 3 TIMES DAILY
Status: DISCONTINUED | OUTPATIENT
Start: 2023-06-06 | End: 2023-06-16 | Stop reason: HOSPADM

## 2023-06-06 RX ORDER — PREGABALIN 150 MG/1
300 CAPSULE ORAL 2 TIMES DAILY
Status: DISCONTINUED | OUTPATIENT
Start: 2023-06-06 | End: 2023-06-06

## 2023-06-06 RX ADMIN — PREGABALIN 200 MG: 100 CAPSULE ORAL at 07:00

## 2023-06-06 RX ADMIN — PREGABALIN 200 MG: 100 CAPSULE ORAL at 16:27

## 2023-06-06 RX ADMIN — MORPHINE SULFATE 30 MG: 15 TABLET, FILM COATED, EXTENDED RELEASE ORAL at 22:15

## 2023-06-06 RX ADMIN — BACITRACIN ZINC: 500 OINTMENT TOPICAL at 08:03

## 2023-06-06 RX ADMIN — OXYCODONE HYDROCHLORIDE 15 MG: 15 TABLET ORAL at 07:59

## 2023-06-06 RX ADMIN — ACETAMINOPHEN 650 MG: 325 TABLET ORAL at 21:02

## 2023-06-06 RX ADMIN — PREGABALIN 200 MG: 100 CAPSULE ORAL at 21:02

## 2023-06-06 RX ADMIN — POLYETHYLENE GLYCOL 3350 17 GRAM ORAL POWDER PACKET 17 G: at 08:00

## 2023-06-06 RX ADMIN — Medication 500 MG: at 07:59

## 2023-06-06 RX ADMIN — OXYCODONE HYDROCHLORIDE 10 MG: 5 TABLET ORAL at 16:33

## 2023-06-06 RX ADMIN — ACETAMINOPHEN 650 MG: 325 TABLET ORAL at 07:58

## 2023-06-06 RX ADMIN — CALCIUM CARBONATE-VITAMIN D TAB 500 MG-200 UNIT 1 TABLET: 500-200 TAB at 08:01

## 2023-06-06 RX ADMIN — METHOCARBAMOL 1500 MG: 750 TABLET ORAL at 21:02

## 2023-06-06 RX ADMIN — OXYCODONE HYDROCHLORIDE 10 MG: 5 TABLET ORAL at 12:09

## 2023-06-06 RX ADMIN — ACETAMINOPHEN 650 MG: 325 TABLET ORAL at 14:02

## 2023-06-06 RX ADMIN — MORPHINE SULFATE 30 MG: 15 TABLET, FILM COATED, EXTENDED RELEASE ORAL at 06:01

## 2023-06-06 RX ADMIN — MORPHINE SULFATE 30 MG: 15 TABLET, FILM COATED, EXTENDED RELEASE ORAL at 14:02

## 2023-06-06 RX ADMIN — METHOCARBAMOL 1500 MG: 750 TABLET ORAL at 14:02

## 2023-06-06 RX ADMIN — METHOCARBAMOL 1500 MG: 750 TABLET ORAL at 07:59

## 2023-06-06 RX ADMIN — Medication 400 MG: at 08:00

## 2023-06-06 RX ADMIN — BACITRACIN ZINC, NEOMYCIN SULFATE, POLYMYXIN B SULFATE: 3.5; 5000; 4 OINTMENT TOPICAL at 08:00

## 2023-06-06 RX ADMIN — ENOXAPARIN SODIUM 30 MG: 100 INJECTION SUBCUTANEOUS at 08:00

## 2023-06-06 RX ADMIN — ENOXAPARIN SODIUM 30 MG: 100 INJECTION SUBCUTANEOUS at 21:02

## 2023-06-06 ASSESSMENT — PAIN DESCRIPTION - ORIENTATION
ORIENTATION: LEFT;MID
ORIENTATION: LEFT;MID
ORIENTATION: MID
ORIENTATION: LEFT;POSTERIOR
ORIENTATION: LEFT

## 2023-06-06 ASSESSMENT — PAIN - FUNCTIONAL ASSESSMENT: PAIN_FUNCTIONAL_ASSESSMENT: PREVENTS OR INTERFERES SOME ACTIVE ACTIVITIES AND ADLS

## 2023-06-06 ASSESSMENT — PAIN DESCRIPTION - LOCATION
LOCATION: PELVIS;FOOT
LOCATION: PELVIS
LOCATION: HIP

## 2023-06-06 ASSESSMENT — PAIN DESCRIPTION - DESCRIPTORS
DESCRIPTORS: THROBBING;STABBING
DESCRIPTORS: ACHING;DISCOMFORT
DESCRIPTORS: ACHING;DISCOMFORT;DULL

## 2023-06-06 ASSESSMENT — PAIN SCALES - GENERAL
PAINLEVEL_OUTOF10: 9
PAINLEVEL_OUTOF10: 4
PAINLEVEL_OUTOF10: 7
PAINLEVEL_OUTOF10: 6
PAINLEVEL_OUTOF10: 6

## 2023-06-06 ASSESSMENT — PAIN DESCRIPTION - FREQUENCY: FREQUENCY: CONTINUOUS

## 2023-06-06 ASSESSMENT — PAIN DESCRIPTION - ONSET: ONSET: ON-GOING

## 2023-06-06 ASSESSMENT — PAIN DESCRIPTION - PAIN TYPE: TYPE: ACUTE PAIN;SURGICAL PAIN

## 2023-06-07 PROCEDURE — 6370000000 HC RX 637 (ALT 250 FOR IP): Performed by: CLINICAL NURSE SPECIALIST

## 2023-06-07 PROCEDURE — 6370000000 HC RX 637 (ALT 250 FOR IP): Performed by: PHYSICAL MEDICINE & REHABILITATION

## 2023-06-07 PROCEDURE — 97530 THERAPEUTIC ACTIVITIES: CPT

## 2023-06-07 PROCEDURE — 97110 THERAPEUTIC EXERCISES: CPT

## 2023-06-07 PROCEDURE — 1280000000 HC REHAB R&B

## 2023-06-07 PROCEDURE — 97535 SELF CARE MNGMENT TRAINING: CPT

## 2023-06-07 PROCEDURE — 6360000002 HC RX W HCPCS: Performed by: CLINICAL NURSE SPECIALIST

## 2023-06-07 RX ORDER — OXYCODONE HYDROCHLORIDE 5 MG/1
5 TABLET ORAL EVERY 4 HOURS PRN
Status: DISCONTINUED | OUTPATIENT
Start: 2023-06-07 | End: 2023-06-08

## 2023-06-07 RX ORDER — OXYCODONE HYDROCHLORIDE 10 MG/1
10 TABLET ORAL EVERY 4 HOURS PRN
Status: DISCONTINUED | OUTPATIENT
Start: 2023-06-07 | End: 2023-06-08

## 2023-06-07 RX ADMIN — MORPHINE SULFATE 30 MG: 15 TABLET, FILM COATED, EXTENDED RELEASE ORAL at 20:48

## 2023-06-07 RX ADMIN — PREGABALIN 200 MG: 100 CAPSULE ORAL at 20:45

## 2023-06-07 RX ADMIN — POLYETHYLENE GLYCOL 3350 17 GRAM ORAL POWDER PACKET 17 G: at 09:13

## 2023-06-07 RX ADMIN — Medication 400 MG: at 09:15

## 2023-06-07 RX ADMIN — ENOXAPARIN SODIUM 30 MG: 100 INJECTION SUBCUTANEOUS at 20:45

## 2023-06-07 RX ADMIN — METHOCARBAMOL 1500 MG: 750 TABLET ORAL at 09:14

## 2023-06-07 RX ADMIN — OXYCODONE HYDROCHLORIDE 10 MG: 5 TABLET ORAL at 09:14

## 2023-06-07 RX ADMIN — PREGABALIN 200 MG: 100 CAPSULE ORAL at 06:06

## 2023-06-07 RX ADMIN — Medication 500 MG: at 09:15

## 2023-06-07 RX ADMIN — MORPHINE SULFATE 30 MG: 15 TABLET, FILM COATED, EXTENDED RELEASE ORAL at 14:04

## 2023-06-07 RX ADMIN — ACETAMINOPHEN 650 MG: 325 TABLET ORAL at 02:23

## 2023-06-07 RX ADMIN — ACETAMINOPHEN 650 MG: 325 TABLET ORAL at 09:20

## 2023-06-07 RX ADMIN — BACITRACIN ZINC: 500 OINTMENT TOPICAL at 18:11

## 2023-06-07 RX ADMIN — OXYCODONE HYDROCHLORIDE 10 MG: 10 TABLET ORAL at 17:41

## 2023-06-07 RX ADMIN — PREGABALIN 200 MG: 100 CAPSULE ORAL at 14:04

## 2023-06-07 RX ADMIN — METHOCARBAMOL 1500 MG: 750 TABLET ORAL at 14:03

## 2023-06-07 RX ADMIN — BACITRACIN ZINC, NEOMYCIN SULFATE, POLYMYXIN B SULFATE: 3.5; 5000; 4 OINTMENT TOPICAL at 09:14

## 2023-06-07 RX ADMIN — ACETAMINOPHEN 650 MG: 325 TABLET ORAL at 14:03

## 2023-06-07 RX ADMIN — ACETAMINOPHEN 650 MG: 325 TABLET ORAL at 20:45

## 2023-06-07 RX ADMIN — ENOXAPARIN SODIUM 30 MG: 100 INJECTION SUBCUTANEOUS at 09:13

## 2023-06-07 RX ADMIN — CALCIUM CARBONATE-VITAMIN D TAB 500 MG-200 UNIT 1 TABLET: 500-200 TAB at 09:15

## 2023-06-07 RX ADMIN — METHOCARBAMOL 1500 MG: 750 TABLET ORAL at 20:44

## 2023-06-07 RX ADMIN — MORPHINE SULFATE 30 MG: 15 TABLET, FILM COATED, EXTENDED RELEASE ORAL at 05:57

## 2023-06-07 ASSESSMENT — PAIN DESCRIPTION - LOCATION
LOCATION: PELVIS
LOCATION: HIP
LOCATION: PELVIS
LOCATION: PELVIS
LOCATION: HIP;FOOT
LOCATION: HIP;FOOT

## 2023-06-07 ASSESSMENT — PAIN DESCRIPTION - ORIENTATION
ORIENTATION: LEFT
ORIENTATION: LEFT

## 2023-06-07 ASSESSMENT — PAIN DESCRIPTION - DESCRIPTORS
DESCRIPTORS: BURNING;DISCOMFORT
DESCRIPTORS: ACHING;DISCOMFORT
DESCRIPTORS: ACHING;DISCOMFORT;SHARP

## 2023-06-07 ASSESSMENT — PAIN SCALES - GENERAL
PAINLEVEL_OUTOF10: 6
PAINLEVEL_OUTOF10: 9
PAINLEVEL_OUTOF10: 6
PAINLEVEL_OUTOF10: 6
PAINLEVEL_OUTOF10: 9
PAINLEVEL_OUTOF10: 7

## 2023-06-08 LAB
ANION GAP SERPL CALCULATED.3IONS-SCNC: 9 MMOL/L (ref 7–16)
BUN SERPL-MCNC: 10 MG/DL (ref 6–20)
CALCIUM SERPL-MCNC: 8.8 MG/DL (ref 8.6–10.2)
CHLORIDE SERPL-SCNC: 99 MMOL/L (ref 98–107)
CO2 SERPL-SCNC: 28 MMOL/L (ref 22–29)
CREAT SERPL-MCNC: 0.5 MG/DL (ref 0.7–1.2)
ERYTHROCYTE [DISTWIDTH] IN BLOOD BY AUTOMATED COUNT: 13.8 FL (ref 11.5–15)
GLUCOSE SERPL-MCNC: 108 MG/DL (ref 74–99)
HCT VFR BLD AUTO: 32.6 % (ref 37–54)
HGB BLD-MCNC: 10.2 G/DL (ref 12.5–16.5)
MCH RBC QN AUTO: 28.7 PG (ref 26–35)
MCHC RBC AUTO-ENTMCNC: 31.3 % (ref 32–34.5)
MCV RBC AUTO: 91.8 FL (ref 80–99.9)
PLATELET # BLD AUTO: 459 E9/L (ref 130–450)
PMV BLD AUTO: 8.5 FL (ref 7–12)
POTASSIUM SERPL-SCNC: 3.9 MMOL/L (ref 3.5–5)
RBC # BLD AUTO: 3.55 E12/L (ref 3.8–5.8)
SODIUM SERPL-SCNC: 136 MMOL/L (ref 132–146)
WBC # BLD: 4.5 E9/L (ref 4.5–11.5)

## 2023-06-08 PROCEDURE — 6370000000 HC RX 637 (ALT 250 FOR IP): Performed by: CLINICAL NURSE SPECIALIST

## 2023-06-08 PROCEDURE — 97530 THERAPEUTIC ACTIVITIES: CPT

## 2023-06-08 PROCEDURE — 85027 COMPLETE CBC AUTOMATED: CPT

## 2023-06-08 PROCEDURE — 80048 BASIC METABOLIC PNL TOTAL CA: CPT

## 2023-06-08 PROCEDURE — 6370000000 HC RX 637 (ALT 250 FOR IP): Performed by: PHYSICAL MEDICINE & REHABILITATION

## 2023-06-08 PROCEDURE — 97110 THERAPEUTIC EXERCISES: CPT

## 2023-06-08 PROCEDURE — 1280000000 HC REHAB R&B

## 2023-06-08 PROCEDURE — 36415 COLL VENOUS BLD VENIPUNCTURE: CPT

## 2023-06-08 PROCEDURE — 6360000002 HC RX W HCPCS: Performed by: CLINICAL NURSE SPECIALIST

## 2023-06-08 RX ORDER — OXYCODONE HYDROCHLORIDE 5 MG/1
5 TABLET ORAL 4 TIMES DAILY PRN
Status: DISCONTINUED | OUTPATIENT
Start: 2023-06-08 | End: 2023-06-16 | Stop reason: HOSPADM

## 2023-06-08 RX ORDER — OXYCODONE HYDROCHLORIDE 10 MG/1
10 TABLET ORAL 4 TIMES DAILY PRN
Status: DISCONTINUED | OUTPATIENT
Start: 2023-06-08 | End: 2023-06-16 | Stop reason: HOSPADM

## 2023-06-08 RX ADMIN — METHOCARBAMOL 1500 MG: 750 TABLET ORAL at 14:14

## 2023-06-08 RX ADMIN — OXYCODONE HYDROCHLORIDE 10 MG: 10 TABLET ORAL at 18:03

## 2023-06-08 RX ADMIN — POLYETHYLENE GLYCOL 3350 17 GRAM ORAL POWDER PACKET 17 G: at 18:02

## 2023-06-08 RX ADMIN — ACETAMINOPHEN 650 MG: 325 TABLET ORAL at 20:28

## 2023-06-08 RX ADMIN — ENOXAPARIN SODIUM 30 MG: 100 INJECTION SUBCUTANEOUS at 21:29

## 2023-06-08 RX ADMIN — PREGABALIN 200 MG: 100 CAPSULE ORAL at 14:14

## 2023-06-08 RX ADMIN — DICLOFENAC SODIUM 2 G: 10 GEL TOPICAL at 21:32

## 2023-06-08 RX ADMIN — OXYCODONE HYDROCHLORIDE 10 MG: 10 TABLET ORAL at 10:54

## 2023-06-08 RX ADMIN — CALCIUM CARBONATE-VITAMIN D TAB 500 MG-200 UNIT 1 TABLET: 500-200 TAB at 08:17

## 2023-06-08 RX ADMIN — ACETAMINOPHEN 650 MG: 325 TABLET ORAL at 14:14

## 2023-06-08 RX ADMIN — MORPHINE SULFATE 30 MG: 15 TABLET, FILM COATED, EXTENDED RELEASE ORAL at 21:28

## 2023-06-08 RX ADMIN — ACETAMINOPHEN 650 MG: 325 TABLET ORAL at 08:16

## 2023-06-08 RX ADMIN — PREGABALIN 200 MG: 100 CAPSULE ORAL at 21:26

## 2023-06-08 RX ADMIN — METHOCARBAMOL 1500 MG: 750 TABLET ORAL at 08:16

## 2023-06-08 RX ADMIN — BACITRACIN ZINC: 500 OINTMENT TOPICAL at 08:22

## 2023-06-08 RX ADMIN — Medication 500 MG: at 08:16

## 2023-06-08 RX ADMIN — DICLOFENAC SODIUM 2 G: 10 GEL TOPICAL at 10:49

## 2023-06-08 RX ADMIN — METHOCARBAMOL 1500 MG: 750 TABLET ORAL at 21:26

## 2023-06-08 RX ADMIN — MORPHINE SULFATE 30 MG: 15 TABLET, FILM COATED, EXTENDED RELEASE ORAL at 06:07

## 2023-06-08 RX ADMIN — BACITRACIN ZINC, NEOMYCIN SULFATE, POLYMYXIN B SULFATE 1 G: 3.5; 5000; 4 OINTMENT TOPICAL at 08:15

## 2023-06-08 RX ADMIN — Medication 400 MG: at 08:17

## 2023-06-08 RX ADMIN — ENOXAPARIN SODIUM 30 MG: 100 INJECTION SUBCUTANEOUS at 08:19

## 2023-06-08 RX ADMIN — PREGABALIN 200 MG: 100 CAPSULE ORAL at 06:07

## 2023-06-08 RX ADMIN — MORPHINE SULFATE 30 MG: 15 TABLET, FILM COATED, EXTENDED RELEASE ORAL at 14:14

## 2023-06-08 RX ADMIN — ACETAMINOPHEN 650 MG: 325 TABLET ORAL at 02:12

## 2023-06-08 RX ADMIN — POLYETHYLENE GLYCOL 3350 17 GRAM ORAL POWDER PACKET 17 G: at 08:16

## 2023-06-08 RX ADMIN — DICLOFENAC SODIUM 2 G: 10 GEL TOPICAL at 14:17

## 2023-06-08 RX ADMIN — DICLOFENAC SODIUM 2 G: 10 GEL TOPICAL at 18:03

## 2023-06-08 ASSESSMENT — PAIN DESCRIPTION - LOCATION
LOCATION: FOOT
LOCATION: PELVIS
LOCATION: HIP;PELVIS
LOCATION: PELVIS
LOCATION: PELVIS
LOCATION: FOOT

## 2023-06-08 ASSESSMENT — PAIN SCALES - GENERAL
PAINLEVEL_OUTOF10: 9
PAINLEVEL_OUTOF10: 6
PAINLEVEL_OUTOF10: 9
PAINLEVEL_OUTOF10: 7
PAINLEVEL_OUTOF10: 9

## 2023-06-08 ASSESSMENT — PAIN DESCRIPTION - DESCRIPTORS
DESCRIPTORS: SHOOTING;STABBING
DESCRIPTORS: SHOOTING;STABBING;BURNING
DESCRIPTORS: ACHING;DISCOMFORT;SORE
DESCRIPTORS: ACHING;DISCOMFORT;SORE
DESCRIPTORS: SHOOTING;STABBING
DESCRIPTORS: PINS AND NEEDLES;STABBING

## 2023-06-08 ASSESSMENT — PAIN - FUNCTIONAL ASSESSMENT
PAIN_FUNCTIONAL_ASSESSMENT: ACTIVITIES ARE NOT PREVENTED

## 2023-06-08 ASSESSMENT — PAIN DESCRIPTION - ORIENTATION
ORIENTATION: LEFT
ORIENTATION: LEFT
ORIENTATION: LEFT;RIGHT
ORIENTATION: LEFT
ORIENTATION: LEFT
ORIENTATION: LEFT;RIGHT
ORIENTATION: LOWER

## 2023-06-08 ASSESSMENT — PAIN SCALES - WONG BAKER: WONGBAKER_NUMERICALRESPONSE: 10

## 2023-06-09 PROCEDURE — 97530 THERAPEUTIC ACTIVITIES: CPT

## 2023-06-09 PROCEDURE — 6370000000 HC RX 637 (ALT 250 FOR IP): Performed by: CLINICAL NURSE SPECIALIST

## 2023-06-09 PROCEDURE — 97535 SELF CARE MNGMENT TRAINING: CPT

## 2023-06-09 PROCEDURE — 6360000002 HC RX W HCPCS: Performed by: CLINICAL NURSE SPECIALIST

## 2023-06-09 PROCEDURE — 6370000000 HC RX 637 (ALT 250 FOR IP): Performed by: PHYSICAL MEDICINE & REHABILITATION

## 2023-06-09 PROCEDURE — 97110 THERAPEUTIC EXERCISES: CPT

## 2023-06-09 PROCEDURE — 1280000000 HC REHAB R&B

## 2023-06-09 RX ORDER — MORPHINE SULFATE 15 MG/1
15 TABLET, FILM COATED, EXTENDED RELEASE ORAL EVERY 8 HOURS SCHEDULED
Status: DISCONTINUED | OUTPATIENT
Start: 2023-06-09 | End: 2023-06-14

## 2023-06-09 RX ADMIN — Medication 500 MG: at 09:13

## 2023-06-09 RX ADMIN — DICLOFENAC SODIUM 2 G: 10 GEL TOPICAL at 08:13

## 2023-06-09 RX ADMIN — DICLOFENAC SODIUM 2 G: 10 GEL TOPICAL at 16:41

## 2023-06-09 RX ADMIN — DICLOFENAC SODIUM 2 G: 10 GEL TOPICAL at 13:19

## 2023-06-09 RX ADMIN — METHOCARBAMOL 1500 MG: 750 TABLET ORAL at 09:13

## 2023-06-09 RX ADMIN — MORPHINE SULFATE 15 MG: 15 TABLET, FILM COATED, EXTENDED RELEASE ORAL at 21:09

## 2023-06-09 RX ADMIN — MORPHINE SULFATE 30 MG: 15 TABLET, FILM COATED, EXTENDED RELEASE ORAL at 05:55

## 2023-06-09 RX ADMIN — METHOCARBAMOL 1500 MG: 750 TABLET ORAL at 13:54

## 2023-06-09 RX ADMIN — ACETAMINOPHEN 650 MG: 325 TABLET ORAL at 21:09

## 2023-06-09 RX ADMIN — METHOCARBAMOL 1500 MG: 750 TABLET ORAL at 21:10

## 2023-06-09 RX ADMIN — PREGABALIN 200 MG: 100 CAPSULE ORAL at 13:54

## 2023-06-09 RX ADMIN — PREGABALIN 200 MG: 100 CAPSULE ORAL at 05:54

## 2023-06-09 RX ADMIN — Medication 400 MG: at 09:12

## 2023-06-09 RX ADMIN — ENOXAPARIN SODIUM 30 MG: 100 INJECTION SUBCUTANEOUS at 21:08

## 2023-06-09 RX ADMIN — CALCIUM CARBONATE-VITAMIN D TAB 500 MG-200 UNIT 1 TABLET: 500-200 TAB at 09:13

## 2023-06-09 RX ADMIN — ACETAMINOPHEN 650 MG: 325 TABLET ORAL at 13:53

## 2023-06-09 RX ADMIN — PREGABALIN 200 MG: 100 CAPSULE ORAL at 21:09

## 2023-06-09 RX ADMIN — DICLOFENAC SODIUM 2 G: 10 GEL TOPICAL at 22:15

## 2023-06-09 RX ADMIN — ACETAMINOPHEN 650 MG: 325 TABLET ORAL at 01:59

## 2023-06-09 RX ADMIN — POLYETHYLENE GLYCOL 3350 17 GRAM ORAL POWDER PACKET 17 G: at 09:12

## 2023-06-09 RX ADMIN — MORPHINE SULFATE 15 MG: 15 TABLET, FILM COATED, EXTENDED RELEASE ORAL at 13:54

## 2023-06-09 RX ADMIN — ENOXAPARIN SODIUM 30 MG: 100 INJECTION SUBCUTANEOUS at 09:13

## 2023-06-09 RX ADMIN — OXYCODONE HYDROCHLORIDE 10 MG: 10 TABLET ORAL at 14:45

## 2023-06-09 RX ADMIN — ACETAMINOPHEN 650 MG: 325 TABLET ORAL at 09:11

## 2023-06-09 ASSESSMENT — PAIN SCALES - GENERAL
PAINLEVEL_OUTOF10: 7
PAINLEVEL_OUTOF10: 9
PAINLEVEL_OUTOF10: 7
PAINLEVEL_OUTOF10: 9

## 2023-06-09 ASSESSMENT — PAIN DESCRIPTION - LOCATION
LOCATION: FOOT;PELVIS
LOCATION: FOOT
LOCATION: PELVIS;FOOT
LOCATION: FOOT

## 2023-06-09 ASSESSMENT — PAIN DESCRIPTION - DESCRIPTORS
DESCRIPTORS: SHOOTING;STABBING
DESCRIPTORS: SHOOTING;STABBING
DESCRIPTORS: SHOOTING
DESCRIPTORS: STABBING;SHOOTING

## 2023-06-09 ASSESSMENT — PAIN DESCRIPTION - ORIENTATION
ORIENTATION: LEFT

## 2023-06-09 ASSESSMENT — PAIN - FUNCTIONAL ASSESSMENT
PAIN_FUNCTIONAL_ASSESSMENT: ACTIVITIES ARE NOT PREVENTED

## 2023-06-09 ASSESSMENT — PAIN SCALES - WONG BAKER
WONGBAKER_NUMERICALRESPONSE: 4
WONGBAKER_NUMERICALRESPONSE: 2

## 2023-06-10 PROCEDURE — 97110 THERAPEUTIC EXERCISES: CPT

## 2023-06-10 PROCEDURE — 6370000000 HC RX 637 (ALT 250 FOR IP): Performed by: PHYSICAL MEDICINE & REHABILITATION

## 2023-06-10 PROCEDURE — 6360000002 HC RX W HCPCS: Performed by: CLINICAL NURSE SPECIALIST

## 2023-06-10 PROCEDURE — 97530 THERAPEUTIC ACTIVITIES: CPT

## 2023-06-10 PROCEDURE — 6370000000 HC RX 637 (ALT 250 FOR IP): Performed by: CLINICAL NURSE SPECIALIST

## 2023-06-10 PROCEDURE — 1280000000 HC REHAB R&B

## 2023-06-10 RX ADMIN — ACETAMINOPHEN 650 MG: 325 TABLET ORAL at 20:51

## 2023-06-10 RX ADMIN — POLYETHYLENE GLYCOL 3350 17 GRAM ORAL POWDER PACKET 17 G: at 08:09

## 2023-06-10 RX ADMIN — METHOCARBAMOL 1500 MG: 750 TABLET ORAL at 08:09

## 2023-06-10 RX ADMIN — PREGABALIN 200 MG: 100 CAPSULE ORAL at 13:30

## 2023-06-10 RX ADMIN — ACETAMINOPHEN 650 MG: 325 TABLET ORAL at 01:51

## 2023-06-10 RX ADMIN — DICLOFENAC SODIUM 2 G: 10 GEL TOPICAL at 08:16

## 2023-06-10 RX ADMIN — PREGABALIN 200 MG: 100 CAPSULE ORAL at 06:31

## 2023-06-10 RX ADMIN — PREGABALIN 200 MG: 100 CAPSULE ORAL at 20:51

## 2023-06-10 RX ADMIN — MORPHINE SULFATE 15 MG: 15 TABLET, FILM COATED, EXTENDED RELEASE ORAL at 20:54

## 2023-06-10 RX ADMIN — Medication 500 MG: at 08:09

## 2023-06-10 RX ADMIN — CALCIUM CARBONATE-VITAMIN D TAB 500 MG-200 UNIT 1 TABLET: 500-200 TAB at 08:10

## 2023-06-10 RX ADMIN — ACETAMINOPHEN 650 MG: 325 TABLET ORAL at 13:26

## 2023-06-10 RX ADMIN — Medication 400 MG: at 08:09

## 2023-06-10 RX ADMIN — ENOXAPARIN SODIUM 30 MG: 100 INJECTION SUBCUTANEOUS at 20:51

## 2023-06-10 RX ADMIN — ENOXAPARIN SODIUM 30 MG: 100 INJECTION SUBCUTANEOUS at 08:09

## 2023-06-10 RX ADMIN — MORPHINE SULFATE 15 MG: 15 TABLET, FILM COATED, EXTENDED RELEASE ORAL at 06:31

## 2023-06-10 RX ADMIN — METHOCARBAMOL 1500 MG: 750 TABLET ORAL at 13:26

## 2023-06-10 RX ADMIN — ACETAMINOPHEN 650 MG: 325 TABLET ORAL at 08:09

## 2023-06-10 RX ADMIN — OXYCODONE HYDROCHLORIDE 10 MG: 10 TABLET ORAL at 16:29

## 2023-06-10 RX ADMIN — DICLOFENAC SODIUM 2 G: 10 GEL TOPICAL at 13:32

## 2023-06-10 RX ADMIN — METHOCARBAMOL 1500 MG: 750 TABLET ORAL at 20:51

## 2023-06-10 RX ADMIN — MORPHINE SULFATE 15 MG: 15 TABLET, FILM COATED, EXTENDED RELEASE ORAL at 13:26

## 2023-06-10 ASSESSMENT — PAIN DESCRIPTION - ORIENTATION
ORIENTATION: MID
ORIENTATION: LEFT

## 2023-06-10 ASSESSMENT — PAIN DESCRIPTION - DESCRIPTORS
DESCRIPTORS: STABBING;SHOOTING
DESCRIPTORS: STABBING;THROBBING
DESCRIPTORS: ACHING;DISCOMFORT
DESCRIPTORS: THROBBING;STABBING

## 2023-06-10 ASSESSMENT — PAIN DESCRIPTION - LOCATION
LOCATION: PELVIS
LOCATION: FOOT;PELVIS
LOCATION: PELVIS;FOOT
LOCATION: FOOT
LOCATION: PELVIS

## 2023-06-10 ASSESSMENT — PAIN SCALES - GENERAL
PAINLEVEL_OUTOF10: 9
PAINLEVEL_OUTOF10: 8
PAINLEVEL_OUTOF10: 5
PAINLEVEL_OUTOF10: 8
PAINLEVEL_OUTOF10: 7
PAINLEVEL_OUTOF10: 6
PAINLEVEL_OUTOF10: 9

## 2023-06-11 PROCEDURE — 6370000000 HC RX 637 (ALT 250 FOR IP): Performed by: PHYSICAL MEDICINE & REHABILITATION

## 2023-06-11 PROCEDURE — 6370000000 HC RX 637 (ALT 250 FOR IP): Performed by: CLINICAL NURSE SPECIALIST

## 2023-06-11 PROCEDURE — 6360000002 HC RX W HCPCS: Performed by: CLINICAL NURSE SPECIALIST

## 2023-06-11 PROCEDURE — 1280000000 HC REHAB R&B

## 2023-06-11 PROCEDURE — 97530 THERAPEUTIC ACTIVITIES: CPT

## 2023-06-11 RX ADMIN — POLYETHYLENE GLYCOL 3350 17 GRAM ORAL POWDER PACKET 17 G: at 08:47

## 2023-06-11 RX ADMIN — ACETAMINOPHEN 650 MG: 325 TABLET ORAL at 14:13

## 2023-06-11 RX ADMIN — ACETAMINOPHEN 650 MG: 325 TABLET ORAL at 02:04

## 2023-06-11 RX ADMIN — ACETAMINOPHEN 650 MG: 325 TABLET ORAL at 19:29

## 2023-06-11 RX ADMIN — ENOXAPARIN SODIUM 30 MG: 100 INJECTION SUBCUTANEOUS at 08:46

## 2023-06-11 RX ADMIN — OXYCODONE HYDROCHLORIDE 10 MG: 10 TABLET ORAL at 19:29

## 2023-06-11 RX ADMIN — PREGABALIN 200 MG: 100 CAPSULE ORAL at 14:15

## 2023-06-11 RX ADMIN — PREGABALIN 200 MG: 100 CAPSULE ORAL at 06:04

## 2023-06-11 RX ADMIN — Medication 500 MG: at 08:46

## 2023-06-11 RX ADMIN — MORPHINE SULFATE 15 MG: 15 TABLET, FILM COATED, EXTENDED RELEASE ORAL at 06:04

## 2023-06-11 RX ADMIN — MORPHINE SULFATE 15 MG: 15 TABLET, FILM COATED, EXTENDED RELEASE ORAL at 14:13

## 2023-06-11 RX ADMIN — CALCIUM CARBONATE-VITAMIN D TAB 500 MG-200 UNIT 1 TABLET: 500-200 TAB at 08:47

## 2023-06-11 RX ADMIN — ENOXAPARIN SODIUM 30 MG: 100 INJECTION SUBCUTANEOUS at 21:05

## 2023-06-11 RX ADMIN — PREGABALIN 200 MG: 100 CAPSULE ORAL at 21:05

## 2023-06-11 RX ADMIN — Medication 400 MG: at 08:46

## 2023-06-11 RX ADMIN — MORPHINE SULFATE 15 MG: 15 TABLET, FILM COATED, EXTENDED RELEASE ORAL at 21:07

## 2023-06-11 RX ADMIN — BACITRACIN ZINC, NEOMYCIN SULFATE, POLYMYXIN B SULFATE: 3.5; 5000; 4 OINTMENT TOPICAL at 08:48

## 2023-06-11 RX ADMIN — ACETAMINOPHEN 650 MG: 325 TABLET ORAL at 08:46

## 2023-06-11 RX ADMIN — METHOCARBAMOL 1500 MG: 750 TABLET ORAL at 21:05

## 2023-06-11 RX ADMIN — METHOCARBAMOL 1500 MG: 750 TABLET ORAL at 08:46

## 2023-06-11 RX ADMIN — BACITRACIN ZINC: 500 OINTMENT TOPICAL at 08:48

## 2023-06-11 RX ADMIN — BACITRACIN ZINC: 500 OINTMENT TOPICAL at 14:16

## 2023-06-11 RX ADMIN — METHOCARBAMOL 1500 MG: 750 TABLET ORAL at 14:12

## 2023-06-11 ASSESSMENT — PAIN DESCRIPTION - ORIENTATION
ORIENTATION: LOWER
ORIENTATION: LEFT

## 2023-06-11 ASSESSMENT — PAIN SCALES - GENERAL
PAINLEVEL_OUTOF10: 9
PAINLEVEL_OUTOF10: 8
PAINLEVEL_OUTOF10: 9
PAINLEVEL_OUTOF10: 6
PAINLEVEL_OUTOF10: 9

## 2023-06-11 ASSESSMENT — PAIN DESCRIPTION - LOCATION
LOCATION: BACK
LOCATION: FOOT
LOCATION: FOOT
LOCATION: PELVIS
LOCATION: FOOT

## 2023-06-11 ASSESSMENT — PAIN DESCRIPTION - DESCRIPTORS
DESCRIPTORS: STABBING;SHOOTING
DESCRIPTORS: STABBING;SHOOTING
DESCRIPTORS: SHOOTING
DESCRIPTORS: ACHING;CRAMPING
DESCRIPTORS: ACHING;DISCOMFORT

## 2023-06-12 ENCOUNTER — APPOINTMENT (OUTPATIENT)
Dept: GENERAL RADIOLOGY | Age: 38
DRG: 516 | End: 2023-06-12
Attending: PHYSICAL MEDICINE & REHABILITATION
Payer: COMMERCIAL

## 2023-06-12 LAB
ALBUMIN SERPL-MCNC: 3.4 G/DL (ref 3.5–5.2)
ALP SERPL-CCNC: 120 U/L (ref 40–129)
ALT SERPL-CCNC: 44 U/L (ref 0–40)
ANION GAP SERPL CALCULATED.3IONS-SCNC: 10 MMOL/L (ref 7–16)
AST SERPL-CCNC: 28 U/L (ref 0–39)
BASOPHILS # BLD: 0.02 E9/L (ref 0–0.2)
BASOPHILS NFR BLD: 0.3 % (ref 0–2)
BILIRUB SERPL-MCNC: 0.5 MG/DL (ref 0–1.2)
BUN SERPL-MCNC: 12 MG/DL (ref 6–20)
CALCIUM SERPL-MCNC: 9 MG/DL (ref 8.6–10.2)
CHLORIDE SERPL-SCNC: 100 MMOL/L (ref 98–107)
CO2 SERPL-SCNC: 27 MMOL/L (ref 22–29)
CREAT SERPL-MCNC: 0.6 MG/DL (ref 0.7–1.2)
EOSINOPHIL # BLD: 0.03 E9/L (ref 0.05–0.5)
EOSINOPHIL NFR BLD: 0.5 % (ref 0–6)
ERYTHROCYTE [DISTWIDTH] IN BLOOD BY AUTOMATED COUNT: 13.9 FL (ref 11.5–15)
GLUCOSE SERPL-MCNC: 113 MG/DL (ref 74–99)
HCT VFR BLD AUTO: 35 % (ref 37–54)
HGB BLD-MCNC: 10.9 G/DL (ref 12.5–16.5)
IMM GRANULOCYTES # BLD: 0.05 E9/L
IMM GRANULOCYTES NFR BLD: 0.9 % (ref 0–5)
LYMPHOCYTES # BLD: 1.16 E9/L (ref 1.5–4)
LYMPHOCYTES NFR BLD: 20.2 % (ref 20–42)
MCH RBC QN AUTO: 28.8 PG (ref 26–35)
MCHC RBC AUTO-ENTMCNC: 31.1 % (ref 32–34.5)
MCV RBC AUTO: 92.6 FL (ref 80–99.9)
MONOCYTES # BLD: 0.44 E9/L (ref 0.1–0.95)
MONOCYTES NFR BLD: 7.7 % (ref 2–12)
NEUTROPHILS # BLD: 4.05 E9/L (ref 1.8–7.3)
NEUTS SEG NFR BLD: 70.4 % (ref 43–80)
PLATELET # BLD AUTO: 493 E9/L (ref 130–450)
PMV BLD AUTO: 8.8 FL (ref 7–12)
POTASSIUM SERPL-SCNC: 3.8 MMOL/L (ref 3.5–5)
PROT SERPL-MCNC: 6.4 G/DL (ref 6.4–8.3)
RBC # BLD AUTO: 3.78 E12/L (ref 3.8–5.8)
SODIUM SERPL-SCNC: 137 MMOL/L (ref 132–146)
WBC # BLD: 5.8 E9/L (ref 4.5–11.5)

## 2023-06-12 PROCEDURE — 85025 COMPLETE CBC W/AUTO DIFF WBC: CPT

## 2023-06-12 PROCEDURE — 6370000000 HC RX 637 (ALT 250 FOR IP): Performed by: PHYSICAL MEDICINE & REHABILITATION

## 2023-06-12 PROCEDURE — 72170 X-RAY EXAM OF PELVIS: CPT

## 2023-06-12 PROCEDURE — 80053 COMPREHEN METABOLIC PANEL: CPT

## 2023-06-12 PROCEDURE — 6360000002 HC RX W HCPCS: Performed by: CLINICAL NURSE SPECIALIST

## 2023-06-12 PROCEDURE — 1280000000 HC REHAB R&B

## 2023-06-12 PROCEDURE — 97530 THERAPEUTIC ACTIVITIES: CPT

## 2023-06-12 PROCEDURE — 72220 X-RAY EXAM SACRUM TAILBONE: CPT

## 2023-06-12 PROCEDURE — 36415 COLL VENOUS BLD VENIPUNCTURE: CPT

## 2023-06-12 PROCEDURE — 6370000000 HC RX 637 (ALT 250 FOR IP): Performed by: CLINICAL NURSE SPECIALIST

## 2023-06-12 RX ORDER — METHOCARBAMOL 500 MG/1
1000 TABLET, FILM COATED ORAL 3 TIMES DAILY
Status: DISCONTINUED | OUTPATIENT
Start: 2023-06-12 | End: 2023-06-16 | Stop reason: HOSPADM

## 2023-06-12 RX ORDER — DULOXETIN HYDROCHLORIDE 30 MG/1
30 CAPSULE, DELAYED RELEASE ORAL DAILY
Status: DISCONTINUED | OUTPATIENT
Start: 2023-06-12 | End: 2023-06-16 | Stop reason: HOSPADM

## 2023-06-12 RX ORDER — OXYCODONE HYDROCHLORIDE 10 MG/1
10 TABLET ORAL ONCE
Status: COMPLETED | OUTPATIENT
Start: 2023-06-12 | End: 2023-06-12

## 2023-06-12 RX ADMIN — Medication 400 MG: at 08:43

## 2023-06-12 RX ADMIN — DULOXETINE HYDROCHLORIDE 30 MG: 30 CAPSULE, DELAYED RELEASE ORAL at 09:41

## 2023-06-12 RX ADMIN — MORPHINE SULFATE 15 MG: 15 TABLET, FILM COATED, EXTENDED RELEASE ORAL at 06:19

## 2023-06-12 RX ADMIN — PREGABALIN 200 MG: 100 CAPSULE ORAL at 13:37

## 2023-06-12 RX ADMIN — PREGABALIN 200 MG: 100 CAPSULE ORAL at 21:49

## 2023-06-12 RX ADMIN — POLYETHYLENE GLYCOL 3350 17 GRAM ORAL POWDER PACKET 17 G: at 08:44

## 2023-06-12 RX ADMIN — ACETAMINOPHEN 650 MG: 325 TABLET ORAL at 08:43

## 2023-06-12 RX ADMIN — ACETAMINOPHEN 650 MG: 325 TABLET ORAL at 21:49

## 2023-06-12 RX ADMIN — OXYCODONE HYDROCHLORIDE 10 MG: 10 TABLET ORAL at 09:48

## 2023-06-12 RX ADMIN — ENOXAPARIN SODIUM 30 MG: 100 INJECTION SUBCUTANEOUS at 21:49

## 2023-06-12 RX ADMIN — PREGABALIN 200 MG: 100 CAPSULE ORAL at 06:19

## 2023-06-12 RX ADMIN — METHOCARBAMOL 1000 MG: 500 TABLET ORAL at 21:49

## 2023-06-12 RX ADMIN — METHOCARBAMOL 1500 MG: 750 TABLET ORAL at 08:44

## 2023-06-12 RX ADMIN — ENOXAPARIN SODIUM 30 MG: 100 INJECTION SUBCUTANEOUS at 08:44

## 2023-06-12 RX ADMIN — ACETAMINOPHEN 650 MG: 325 TABLET ORAL at 13:37

## 2023-06-12 RX ADMIN — METHOCARBAMOL 1000 MG: 500 TABLET ORAL at 13:37

## 2023-06-12 RX ADMIN — CALCIUM CARBONATE-VITAMIN D TAB 500 MG-200 UNIT 1 TABLET: 500-200 TAB at 08:44

## 2023-06-12 RX ADMIN — MORPHINE SULFATE 15 MG: 15 TABLET, FILM COATED, EXTENDED RELEASE ORAL at 21:49

## 2023-06-12 RX ADMIN — OXYCODONE HYDROCHLORIDE 10 MG: 10 TABLET ORAL at 14:57

## 2023-06-12 RX ADMIN — ACETAMINOPHEN 650 MG: 325 TABLET ORAL at 02:23

## 2023-06-12 RX ADMIN — OXYCODONE HYDROCHLORIDE 10 MG: 10 TABLET ORAL at 18:53

## 2023-06-12 RX ADMIN — Medication 500 MG: at 08:43

## 2023-06-12 RX ADMIN — MORPHINE SULFATE 15 MG: 15 TABLET, FILM COATED, EXTENDED RELEASE ORAL at 13:37

## 2023-06-12 ASSESSMENT — PAIN DESCRIPTION - ORIENTATION
ORIENTATION: MID
ORIENTATION: RIGHT
ORIENTATION: MID
ORIENTATION: RIGHT;LEFT
ORIENTATION: LEFT
ORIENTATION: LEFT
ORIENTATION: MID

## 2023-06-12 ASSESSMENT — PAIN DESCRIPTION - LOCATION
LOCATION: PELVIS
LOCATION: SACRUM
LOCATION: PELVIS
LOCATION: HIP
LOCATION: FOOT

## 2023-06-12 ASSESSMENT — PAIN SCALES - GENERAL
PAINLEVEL_OUTOF10: 9
PAINLEVEL_OUTOF10: 8
PAINLEVEL_OUTOF10: 7
PAINLEVEL_OUTOF10: 7
PAINLEVEL_OUTOF10: 9

## 2023-06-12 ASSESSMENT — PAIN SCALES - WONG BAKER: WONGBAKER_NUMERICALRESPONSE: 8

## 2023-06-12 ASSESSMENT — PAIN DESCRIPTION - DESCRIPTORS
DESCRIPTORS: THROBBING;STABBING
DESCRIPTORS: SHARP;STABBING
DESCRIPTORS: ACHING;DISCOMFORT;SORE
DESCRIPTORS: STABBING;THROBBING
DESCRIPTORS: SHARP;STABBING
DESCRIPTORS: STABBING;THROBBING
DESCRIPTORS: SHARP;STABBING;TINGLING

## 2023-06-13 LAB
ABO + RH BLD: NORMAL
BLD GP AB SCN SERPL QL: NORMAL
ERYTHROCYTE [DISTWIDTH] IN BLOOD BY AUTOMATED COUNT: 13.7 FL (ref 11.5–15)
HCT VFR BLD AUTO: 36.6 % (ref 37–54)
HGB BLD-MCNC: 11.3 G/DL (ref 12.5–16.5)
INR BLD: 1.2
MCH RBC QN AUTO: 28.5 PG (ref 26–35)
MCHC RBC AUTO-ENTMCNC: 30.9 % (ref 32–34.5)
MCV RBC AUTO: 92.4 FL (ref 80–99.9)
PLATELET # BLD AUTO: 477 E9/L (ref 130–450)
PMV BLD AUTO: 8.8 FL (ref 7–12)
PROTHROMBIN TIME: 12.8 SEC (ref 9.3–12.4)
RBC # BLD AUTO: 3.96 E12/L (ref 3.8–5.8)
WBC # BLD: 6.5 E9/L (ref 4.5–11.5)

## 2023-06-13 PROCEDURE — 86900 BLOOD TYPING SEROLOGIC ABO: CPT

## 2023-06-13 PROCEDURE — 85027 COMPLETE CBC AUTOMATED: CPT

## 2023-06-13 PROCEDURE — 6370000000 HC RX 637 (ALT 250 FOR IP): Performed by: PHYSICAL MEDICINE & REHABILITATION

## 2023-06-13 PROCEDURE — 97110 THERAPEUTIC EXERCISES: CPT

## 2023-06-13 PROCEDURE — 86901 BLOOD TYPING SEROLOGIC RH(D): CPT

## 2023-06-13 PROCEDURE — 36415 COLL VENOUS BLD VENIPUNCTURE: CPT

## 2023-06-13 PROCEDURE — 97530 THERAPEUTIC ACTIVITIES: CPT

## 2023-06-13 PROCEDURE — 85610 PROTHROMBIN TIME: CPT

## 2023-06-13 PROCEDURE — 1280000000 HC REHAB R&B

## 2023-06-13 PROCEDURE — 97535 SELF CARE MNGMENT TRAINING: CPT

## 2023-06-13 PROCEDURE — 86850 RBC ANTIBODY SCREEN: CPT

## 2023-06-13 PROCEDURE — 6370000000 HC RX 637 (ALT 250 FOR IP): Performed by: CLINICAL NURSE SPECIALIST

## 2023-06-13 RX ADMIN — PREGABALIN 200 MG: 100 CAPSULE ORAL at 21:34

## 2023-06-13 RX ADMIN — MORPHINE SULFATE 15 MG: 15 TABLET, FILM COATED, EXTENDED RELEASE ORAL at 13:31

## 2023-06-13 RX ADMIN — PREGABALIN 200 MG: 100 CAPSULE ORAL at 05:51

## 2023-06-13 RX ADMIN — MORPHINE SULFATE 15 MG: 15 TABLET, FILM COATED, EXTENDED RELEASE ORAL at 05:51

## 2023-06-13 RX ADMIN — Medication 400 MG: at 08:49

## 2023-06-13 RX ADMIN — PREGABALIN 200 MG: 100 CAPSULE ORAL at 13:31

## 2023-06-13 RX ADMIN — MORPHINE SULFATE 15 MG: 15 TABLET, FILM COATED, EXTENDED RELEASE ORAL at 21:34

## 2023-06-13 RX ADMIN — POLYETHYLENE GLYCOL 3350 17 GRAM ORAL POWDER PACKET 17 G: at 08:49

## 2023-06-13 RX ADMIN — Medication 500 MG: at 08:49

## 2023-06-13 RX ADMIN — METHOCARBAMOL 1000 MG: 500 TABLET ORAL at 21:34

## 2023-06-13 RX ADMIN — CALCIUM CARBONATE-VITAMIN D TAB 500 MG-200 UNIT 1 TABLET: 500-200 TAB at 08:50

## 2023-06-13 RX ADMIN — ACETAMINOPHEN 650 MG: 325 TABLET ORAL at 21:33

## 2023-06-13 RX ADMIN — METHOCARBAMOL 1000 MG: 500 TABLET ORAL at 13:30

## 2023-06-13 RX ADMIN — DULOXETINE HYDROCHLORIDE 30 MG: 30 CAPSULE, DELAYED RELEASE ORAL at 08:49

## 2023-06-13 RX ADMIN — ACETAMINOPHEN 650 MG: 325 TABLET ORAL at 13:31

## 2023-06-13 RX ADMIN — ONDANSETRON 4 MG: 4 TABLET, ORALLY DISINTEGRATING ORAL at 11:10

## 2023-06-13 RX ADMIN — ACETAMINOPHEN 650 MG: 325 TABLET ORAL at 08:49

## 2023-06-13 RX ADMIN — METHOCARBAMOL 1000 MG: 500 TABLET ORAL at 08:49

## 2023-06-13 ASSESSMENT — PAIN SCALES - WONG BAKER: WONGBAKER_NUMERICALRESPONSE: 2

## 2023-06-13 ASSESSMENT — PAIN DESCRIPTION - ORIENTATION: ORIENTATION: LEFT

## 2023-06-13 ASSESSMENT — PAIN SCALES - GENERAL
PAINLEVEL_OUTOF10: 9
PAINLEVEL_OUTOF10: 8

## 2023-06-13 ASSESSMENT — PAIN DESCRIPTION - LOCATION: LOCATION: FOOT

## 2023-06-13 NOTE — PLAN OF CARE
Problem: Discharge Planning  Goal: Discharge to home or other facility with appropriate resources  6/13/2023 1032 by Dom Parker RN  Outcome: Progressing  Flowsheets (Taken 6/13/2023 0800)  Discharge to home or other facility with appropriate resources: Identify barriers to discharge with patient and caregiver  6/12/2023 2316 by Scott Juarez RN  Outcome: Progressing     Problem: Safety - Adult  Goal: Free from fall injury  6/13/2023 1032 by Dom Parker RN  Outcome: Progressing  6/12/2023 2316 by Scott Juarez RN  Outcome: Progressing     Problem: Pain  Goal: Verbalizes/displays adequate comfort level or baseline comfort level  6/13/2023 1032 by Dom Parker RN  Outcome: Progressing  6/12/2023 2316 by Scott Juarez RN  Outcome: Progressing     Problem: Skin/Tissue Integrity  Goal: Absence of new skin breakdown  Description: 1. Monitor for areas of redness and/or skin breakdown  2. Assess vascular access sites hourly  3. Every 4-6 hours minimum:  Change oxygen saturation probe site  4. Every 4-6 hours:  If on nasal continuous positive airway pressure, respiratory therapy assess nares and determine need for appliance change or resting period.   Outcome: Progressing     Problem: ABCDS Injury Assessment  Goal: Absence of physical injury  Outcome: Progressing  Flowsheets (Taken 6/13/2023 1031)  Absence of Physical Injury: Implement safety measures based on patient assessment     Problem: Nutrition Deficit:  Goal: Optimize nutritional status  Outcome: Progressing

## 2023-06-14 ENCOUNTER — APPOINTMENT (OUTPATIENT)
Dept: GENERAL RADIOLOGY | Age: 38
DRG: 516 | End: 2023-06-14
Attending: PHYSICAL MEDICINE & REHABILITATION
Payer: COMMERCIAL

## 2023-06-14 PROCEDURE — 1280000000 HC REHAB R&B

## 2023-06-14 PROCEDURE — 6370000000 HC RX 637 (ALT 250 FOR IP): Performed by: CLINICAL NURSE SPECIALIST

## 2023-06-14 PROCEDURE — 0QS335Z REPOSITION LEFT PELVIC BONE WITH EXTERNAL FIXATION DEVICE, PERCUTANEOUS APPROACH: ICD-10-PCS | Performed by: ORTHOPAEDIC SURGERY

## 2023-06-14 PROCEDURE — 6360000002 HC RX W HCPCS: Performed by: CLINICAL NURSE SPECIALIST

## 2023-06-14 PROCEDURE — 2709999900 HC NON-CHARGEABLE SUPPLY: Performed by: ORTHOPAEDIC SURGERY

## 2023-06-14 PROCEDURE — 6370000000 HC RX 637 (ALT 250 FOR IP): Performed by: PHYSICAL MEDICINE & REHABILITATION

## 2023-06-14 PROCEDURE — 3700000000 HC ANESTHESIA ATTENDED CARE: Performed by: ORTHOPAEDIC SURGERY

## 2023-06-14 PROCEDURE — 7100000000 HC PACU RECOVERY - FIRST 15 MIN: Performed by: ORTHOPAEDIC SURGERY

## 2023-06-14 PROCEDURE — 7100000001 HC PACU RECOVERY - ADDTL 15 MIN: Performed by: ORTHOPAEDIC SURGERY

## 2023-06-14 PROCEDURE — 3600000002 HC SURGERY LEVEL 2 BASE: Performed by: ORTHOPAEDIC SURGERY

## 2023-06-14 PROCEDURE — 3700000001 HC ADD 15 MINUTES (ANESTHESIA): Performed by: ORTHOPAEDIC SURGERY

## 2023-06-14 PROCEDURE — 3209999900 FLUORO FOR SURGICAL PROCEDURES

## 2023-06-14 PROCEDURE — 20693 ADJMT/REVJ EXT FIXJ SYS ANES: CPT | Performed by: ORTHOPAEDIC SURGERY

## 2023-06-14 PROCEDURE — 0QS235Z REPOSITION RIGHT PELVIC BONE WITH EXTERNAL FIXATION DEVICE, PERCUTANEOUS APPROACH: ICD-10-PCS | Performed by: ORTHOPAEDIC SURGERY

## 2023-06-14 PROCEDURE — 3600000012 HC SURGERY LEVEL 2 ADDTL 15MIN: Performed by: ORTHOPAEDIC SURGERY

## 2023-06-14 RX ORDER — MEPERIDINE HYDROCHLORIDE 25 MG/ML
12.5 INJECTION INTRAMUSCULAR; INTRAVENOUS; SUBCUTANEOUS EVERY 5 MIN PRN
Status: CANCELLED | OUTPATIENT
Start: 2023-06-14

## 2023-06-14 RX ORDER — LIDOCAINE 40 MG/G
CREAM TOPICAL 4 TIMES DAILY
Status: DISCONTINUED | OUTPATIENT
Start: 2023-06-14 | End: 2023-06-16 | Stop reason: HOSPADM

## 2023-06-14 RX ORDER — HYDROMORPHONE HYDROCHLORIDE 1 MG/ML
0.5 INJECTION, SOLUTION INTRAMUSCULAR; INTRAVENOUS; SUBCUTANEOUS EVERY 5 MIN PRN
Status: CANCELLED | OUTPATIENT
Start: 2023-06-14

## 2023-06-14 RX ORDER — SODIUM CHLORIDE 0.9 % (FLUSH) 0.9 %
5-40 SYRINGE (ML) INJECTION PRN
Status: CANCELLED | OUTPATIENT
Start: 2023-06-14

## 2023-06-14 RX ORDER — SODIUM CHLORIDE 0.9 % (FLUSH) 0.9 %
5-40 SYRINGE (ML) INJECTION EVERY 12 HOURS SCHEDULED
Status: CANCELLED | OUTPATIENT
Start: 2023-06-14

## 2023-06-14 RX ORDER — MORPHINE SULFATE 15 MG/1
15 TABLET, FILM COATED, EXTENDED RELEASE ORAL EVERY 12 HOURS SCHEDULED
Status: DISCONTINUED | OUTPATIENT
Start: 2023-06-14 | End: 2023-06-16 | Stop reason: HOSPADM

## 2023-06-14 RX ORDER — SODIUM CHLORIDE 9 MG/ML
INJECTION, SOLUTION INTRAVENOUS PRN
Status: CANCELLED | OUTPATIENT
Start: 2023-06-14

## 2023-06-14 RX ADMIN — METHOCARBAMOL 1000 MG: 500 TABLET ORAL at 13:34

## 2023-06-14 RX ADMIN — LIDOCAINE 4%: 4 CREAM TOPICAL at 20:59

## 2023-06-14 RX ADMIN — ACETAMINOPHEN 650 MG: 325 TABLET ORAL at 20:58

## 2023-06-14 RX ADMIN — DULOXETINE HYDROCHLORIDE 30 MG: 30 CAPSULE, DELAYED RELEASE ORAL at 15:52

## 2023-06-14 RX ADMIN — PREGABALIN 200 MG: 100 CAPSULE ORAL at 05:57

## 2023-06-14 RX ADMIN — LIDOCAINE 4%: 4 CREAM TOPICAL at 15:49

## 2023-06-14 RX ADMIN — PREGABALIN 200 MG: 100 CAPSULE ORAL at 13:32

## 2023-06-14 RX ADMIN — POLYETHYLENE GLYCOL 3350 17 GRAM ORAL POWDER PACKET 17 G: at 17:59

## 2023-06-14 RX ADMIN — ACETAMINOPHEN 650 MG: 325 TABLET ORAL at 13:34

## 2023-06-14 RX ADMIN — MORPHINE SULFATE 15 MG: 15 TABLET, FILM COATED, EXTENDED RELEASE ORAL at 05:56

## 2023-06-14 RX ADMIN — ENOXAPARIN SODIUM 30 MG: 100 INJECTION SUBCUTANEOUS at 13:36

## 2023-06-14 RX ADMIN — PREGABALIN 200 MG: 100 CAPSULE ORAL at 20:58

## 2023-06-14 RX ADMIN — OXYCODONE HYDROCHLORIDE 10 MG: 10 TABLET ORAL at 15:49

## 2023-06-14 RX ADMIN — ENOXAPARIN SODIUM 30 MG: 100 INJECTION SUBCUTANEOUS at 20:58

## 2023-06-14 RX ADMIN — ACETAMINOPHEN 650 MG: 325 TABLET ORAL at 02:00

## 2023-06-14 RX ADMIN — MORPHINE SULFATE 15 MG: 15 TABLET, FILM COATED, EXTENDED RELEASE ORAL at 13:34

## 2023-06-14 RX ADMIN — METHOCARBAMOL 1000 MG: 500 TABLET ORAL at 20:58

## 2023-06-14 RX ADMIN — MORPHINE SULFATE 15 MG: 15 TABLET, FILM COATED, EXTENDED RELEASE ORAL at 20:58

## 2023-06-14 ASSESSMENT — PAIN DESCRIPTION - DESCRIPTORS
DESCRIPTORS: STABBING;TINGLING
DESCRIPTORS: ACHING
DESCRIPTORS: ACHING

## 2023-06-14 ASSESSMENT — PAIN DESCRIPTION - LOCATION
LOCATION: FOOT
LOCATION: LEG
LOCATION: FOOT

## 2023-06-14 ASSESSMENT — PAIN DESCRIPTION - ORIENTATION
ORIENTATION: LEFT

## 2023-06-14 ASSESSMENT — PAIN SCALES - GENERAL
PAINLEVEL_OUTOF10: 9
PAINLEVEL_OUTOF10: 9
PAINLEVEL_OUTOF10: 0
PAINLEVEL_OUTOF10: 10

## 2023-06-14 NOTE — OP NOTE
6/14/2023     Orthopaedic Attending    I was present and available throughout the procedure. Electronically signed by   Anais Rojas DO  6/14/2023     NOTE: This report was transcribed using voice recognition software.  Every effort was made to ensure accuracy; however, inadvertent computerized transcription errors may be present

## 2023-06-15 PROCEDURE — 6360000002 HC RX W HCPCS: Performed by: CLINICAL NURSE SPECIALIST

## 2023-06-15 PROCEDURE — 97535 SELF CARE MNGMENT TRAINING: CPT

## 2023-06-15 PROCEDURE — 6370000000 HC RX 637 (ALT 250 FOR IP): Performed by: PHYSICAL MEDICINE & REHABILITATION

## 2023-06-15 PROCEDURE — 1280000000 HC REHAB R&B

## 2023-06-15 PROCEDURE — 6370000000 HC RX 637 (ALT 250 FOR IP): Performed by: CLINICAL NURSE SPECIALIST

## 2023-06-15 PROCEDURE — 97110 THERAPEUTIC EXERCISES: CPT

## 2023-06-15 PROCEDURE — 97530 THERAPEUTIC ACTIVITIES: CPT

## 2023-06-15 RX ORDER — LIDOCAINE 40 MG/G
CREAM TOPICAL PRN
Status: DISCONTINUED | OUTPATIENT
Start: 2023-06-15 | End: 2023-06-16 | Stop reason: HOSPADM

## 2023-06-15 RX ADMIN — CALCIUM CARBONATE-VITAMIN D TAB 500 MG-200 UNIT 1 TABLET: 500-200 TAB at 08:54

## 2023-06-15 RX ADMIN — POLYETHYLENE GLYCOL 3350 17 GRAM ORAL POWDER PACKET 17 G: at 08:54

## 2023-06-15 RX ADMIN — PREGABALIN 200 MG: 100 CAPSULE ORAL at 13:32

## 2023-06-15 RX ADMIN — LIDOCAINE 4%: 4 CREAM TOPICAL at 17:00

## 2023-06-15 RX ADMIN — LIDOCAINE 4%: 4 CREAM TOPICAL at 13:07

## 2023-06-15 RX ADMIN — PREGABALIN 200 MG: 100 CAPSULE ORAL at 06:23

## 2023-06-15 RX ADMIN — ENOXAPARIN SODIUM 30 MG: 100 INJECTION SUBCUTANEOUS at 20:27

## 2023-06-15 RX ADMIN — LIDOCAINE 4%: 4 CREAM TOPICAL at 08:53

## 2023-06-15 RX ADMIN — ACETAMINOPHEN 650 MG: 325 TABLET ORAL at 20:26

## 2023-06-15 RX ADMIN — ACETAMINOPHEN 650 MG: 325 TABLET ORAL at 01:57

## 2023-06-15 RX ADMIN — OXYCODONE HYDROCHLORIDE 10 MG: 10 TABLET ORAL at 06:22

## 2023-06-15 RX ADMIN — METHOCARBAMOL 1000 MG: 500 TABLET ORAL at 08:54

## 2023-06-15 RX ADMIN — METHOCARBAMOL 1000 MG: 500 TABLET ORAL at 20:25

## 2023-06-15 RX ADMIN — MORPHINE SULFATE 15 MG: 15 TABLET, FILM COATED, EXTENDED RELEASE ORAL at 08:54

## 2023-06-15 RX ADMIN — ACETAMINOPHEN 650 MG: 325 TABLET ORAL at 08:54

## 2023-06-15 RX ADMIN — ACETAMINOPHEN 650 MG: 325 TABLET ORAL at 13:32

## 2023-06-15 RX ADMIN — Medication 500 MG: at 08:54

## 2023-06-15 RX ADMIN — POLYETHYLENE GLYCOL 3350 17 GRAM ORAL POWDER PACKET 17 G: at 17:03

## 2023-06-15 RX ADMIN — Medication 400 MG: at 08:54

## 2023-06-15 RX ADMIN — PREGABALIN 200 MG: 100 CAPSULE ORAL at 20:26

## 2023-06-15 RX ADMIN — METHOCARBAMOL 1000 MG: 500 TABLET ORAL at 13:32

## 2023-06-15 RX ADMIN — MORPHINE SULFATE 15 MG: 15 TABLET, FILM COATED, EXTENDED RELEASE ORAL at 20:27

## 2023-06-15 RX ADMIN — ENOXAPARIN SODIUM 30 MG: 100 INJECTION SUBCUTANEOUS at 08:54

## 2023-06-15 RX ADMIN — OXYCODONE HYDROCHLORIDE 10 MG: 10 TABLET ORAL at 18:41

## 2023-06-15 RX ADMIN — OXYCODONE HYDROCHLORIDE 10 MG: 10 TABLET ORAL at 12:25

## 2023-06-15 RX ADMIN — LIDOCAINE 4%: 4 CREAM TOPICAL at 20:26

## 2023-06-15 RX ADMIN — DULOXETINE HYDROCHLORIDE 30 MG: 30 CAPSULE, DELAYED RELEASE ORAL at 08:54

## 2023-06-15 ASSESSMENT — PAIN DESCRIPTION - DESCRIPTORS
DESCRIPTORS: STABBING;THROBBING
DESCRIPTORS: ACHING;DISCOMFORT;SORE

## 2023-06-15 ASSESSMENT — PAIN SCALES - GENERAL
PAINLEVEL_OUTOF10: 8
PAINLEVEL_OUTOF10: 8
PAINLEVEL_OUTOF10: 5
PAINLEVEL_OUTOF10: 6
PAINLEVEL_OUTOF10: 7
PAINLEVEL_OUTOF10: 8
PAINLEVEL_OUTOF10: 7

## 2023-06-15 ASSESSMENT — PAIN DESCRIPTION - ONSET: ONSET: ON-GOING

## 2023-06-15 ASSESSMENT — PAIN DESCRIPTION - LOCATION
LOCATION: PELVIS
LOCATION: FOOT;PELVIS
LOCATION: PELVIS
LOCATION: PELVIS;FOOT
LOCATION: PELVIS
LOCATION: PELVIS

## 2023-06-15 ASSESSMENT — PAIN DESCRIPTION - PAIN TYPE: TYPE: ACUTE PAIN;SURGICAL PAIN

## 2023-06-15 ASSESSMENT — PAIN - FUNCTIONAL ASSESSMENT
PAIN_FUNCTIONAL_ASSESSMENT: ACTIVITIES ARE NOT PREVENTED

## 2023-06-15 ASSESSMENT — PAIN DESCRIPTION - ORIENTATION
ORIENTATION: MID
ORIENTATION: LEFT;MID
ORIENTATION: MID;LEFT
ORIENTATION: MID

## 2023-06-15 ASSESSMENT — PAIN DESCRIPTION - FREQUENCY: FREQUENCY: CONTINUOUS

## 2023-06-15 NOTE — PLAN OF CARE
Problem: Discharge Planning  Goal: Discharge to home or other facility with appropriate resources  Outcome: Progressing  Flowsheets (Taken 6/15/2023 0930)  Discharge to home or other facility with appropriate resources: Identify barriers to discharge with patient and caregiver     Problem: Safety - Adult  Goal: Free from fall injury  Outcome: Progressing     Problem: Pain  Goal: Verbalizes/displays adequate comfort level or baseline comfort level  Outcome: Progressing     Problem: Skin/Tissue Integrity  Goal: Absence of new skin breakdown  Description: 1. Monitor for areas of redness and/or skin breakdown  2. Assess vascular access sites hourly  3. Every 4-6 hours minimum:  Change oxygen saturation probe site  4. Every 4-6 hours:  If on nasal continuous positive airway pressure, respiratory therapy assess nares and determine need for appliance change or resting period.   Outcome: Progressing     Problem: ABCDS Injury Assessment  Goal: Absence of physical injury  Outcome: Progressing  Flowsheets (Taken 6/15/2023 1813)  Absence of Physical Injury: Implement safety measures based on patient assessment     Problem: Nutrition Deficit:  Goal: Optimize nutritional status  Outcome: Progressing

## 2023-06-15 NOTE — PATIENT CARE CONFERENCE
Modified independent      Patient/family's personal goals: \"take care of myself\"  Factors supporting goal achievement:  family is supportive, has made gains  Factors hindering goal achievement:  pain, wt bearing restrictions, pelvic fixator      Discharge Plan   Estimated Length of Stay: 6/16/23            Destination: home  Services at Discharge: home health per [facility], nursing, PT, OT  Equipment at Discharge: has at home:  a wheelchair,  drop arm commode, hospital bed, today will get extended tub bench, bariatric wheeled walker. , dad will bring wheelchair in for day of discharge,   will need crutches      INTERDISCIPLINARY TEAM/PHYSICIAN RECOMMENDATION AND/OR REVISIONS OF PLAN OF CARE:  finalize discharge for 6/16/23      I approve the established interdisciplinary plan of care as documented within the medical record of [facility]. Electronically signed by Román Edouard RN on 6/15/2023 at 9:55 AM  The following interdisciplinary team members were present:  NADEEM Bowman, LOIDAW  Daryl Du.  WIN Gayle OTR

## 2023-06-16 VITALS
WEIGHT: 215 LBS | BODY MASS INDEX: 29.12 KG/M2 | RESPIRATION RATE: 18 BRPM | OXYGEN SATURATION: 98 % | DIASTOLIC BLOOD PRESSURE: 82 MMHG | HEIGHT: 72 IN | TEMPERATURE: 97.8 F | SYSTOLIC BLOOD PRESSURE: 126 MMHG | HEART RATE: 103 BPM

## 2023-06-16 PROCEDURE — 6360000002 HC RX W HCPCS: Performed by: CLINICAL NURSE SPECIALIST

## 2023-06-16 PROCEDURE — 6370000000 HC RX 637 (ALT 250 FOR IP): Performed by: PHYSICAL MEDICINE & REHABILITATION

## 2023-06-16 PROCEDURE — 97110 THERAPEUTIC EXERCISES: CPT

## 2023-06-16 PROCEDURE — 97530 THERAPEUTIC ACTIVITIES: CPT

## 2023-06-16 PROCEDURE — 6370000000 HC RX 637 (ALT 250 FOR IP): Performed by: CLINICAL NURSE SPECIALIST

## 2023-06-16 RX ORDER — PREGABALIN 200 MG/1
200 CAPSULE ORAL 3 TIMES DAILY
Qty: 90 CAPSULE | Refills: 0 | Status: SHIPPED | OUTPATIENT
Start: 2023-06-16 | End: 2023-07-16

## 2023-06-16 RX ORDER — DULOXETIN HYDROCHLORIDE 30 MG/1
30 CAPSULE, DELAYED RELEASE ORAL DAILY
Qty: 30 CAPSULE | Refills: 0 | Status: SHIPPED | OUTPATIENT
Start: 2023-06-17 | End: 2023-06-23 | Stop reason: SDUPTHER

## 2023-06-16 RX ORDER — METHOCARBAMOL 750 MG/1
750 TABLET, FILM COATED ORAL 3 TIMES DAILY PRN
Qty: 30 TABLET | Refills: 0 | Status: SHIPPED | OUTPATIENT
Start: 2023-06-16 | End: 2023-06-26

## 2023-06-16 RX ORDER — ONDANSETRON 4 MG/1
4 TABLET, ORALLY DISINTEGRATING ORAL EVERY 8 HOURS PRN
Qty: 30 TABLET | Refills: 0 | Status: SHIPPED | OUTPATIENT
Start: 2023-06-16

## 2023-06-16 RX ORDER — OXYCODONE HYDROCHLORIDE 5 MG/1
5 TABLET ORAL EVERY 6 HOURS PRN
Qty: 28 TABLET | Refills: 0 | Status: SHIPPED | OUTPATIENT
Start: 2023-06-16 | End: 2023-06-20 | Stop reason: SDUPTHER

## 2023-06-16 RX ORDER — ENOXAPARIN SODIUM 100 MG/ML
30 INJECTION SUBCUTANEOUS 2 TIMES DAILY
Qty: 12.6 ML | Refills: 0 | Status: SHIPPED | OUTPATIENT
Start: 2023-06-16 | End: 2023-07-07

## 2023-06-16 RX ADMIN — ACETAMINOPHEN 650 MG: 325 TABLET ORAL at 00:02

## 2023-06-16 RX ADMIN — PREGABALIN 200 MG: 100 CAPSULE ORAL at 06:14

## 2023-06-16 RX ADMIN — DULOXETINE HYDROCHLORIDE 30 MG: 30 CAPSULE, DELAYED RELEASE ORAL at 08:33

## 2023-06-16 RX ADMIN — ACETAMINOPHEN 650 MG: 325 TABLET ORAL at 08:33

## 2023-06-16 RX ADMIN — CALCIUM CARBONATE-VITAMIN D TAB 500 MG-200 UNIT 1 TABLET: 500-200 TAB at 08:34

## 2023-06-16 RX ADMIN — Medication 400 MG: at 08:33

## 2023-06-16 RX ADMIN — ACETAMINOPHEN 650 MG: 325 TABLET ORAL at 13:59

## 2023-06-16 RX ADMIN — LIDOCAINE 4%: 4 CREAM TOPICAL at 13:45

## 2023-06-16 RX ADMIN — OXYCODONE HYDROCHLORIDE 10 MG: 10 TABLET ORAL at 06:15

## 2023-06-16 RX ADMIN — OXYCODONE HYDROCHLORIDE 10 MG: 10 TABLET ORAL at 00:02

## 2023-06-16 RX ADMIN — METHOCARBAMOL 1000 MG: 500 TABLET ORAL at 08:33

## 2023-06-16 RX ADMIN — Medication 500 MG: at 08:33

## 2023-06-16 RX ADMIN — MORPHINE SULFATE 15 MG: 15 TABLET, FILM COATED, EXTENDED RELEASE ORAL at 08:33

## 2023-06-16 RX ADMIN — POLYETHYLENE GLYCOL 3350 17 GRAM ORAL POWDER PACKET 17 G: at 08:33

## 2023-06-16 RX ADMIN — LIDOCAINE 4%: 4 CREAM TOPICAL at 08:40

## 2023-06-16 RX ADMIN — PREGABALIN 200 MG: 100 CAPSULE ORAL at 13:59

## 2023-06-16 RX ADMIN — OXYCODONE HYDROCHLORIDE 10 MG: 10 TABLET ORAL at 14:00

## 2023-06-16 RX ADMIN — ENOXAPARIN SODIUM 30 MG: 100 INJECTION SUBCUTANEOUS at 08:33

## 2023-06-16 RX ADMIN — METHOCARBAMOL 1000 MG: 500 TABLET ORAL at 13:59

## 2023-06-16 ASSESSMENT — PAIN DESCRIPTION - ORIENTATION
ORIENTATION: MID
ORIENTATION: MID
ORIENTATION: LEFT

## 2023-06-16 ASSESSMENT — PAIN DESCRIPTION - DESCRIPTORS
DESCRIPTORS: STABBING;THROBBING
DESCRIPTORS: DISCOMFORT;THROBBING;STABBING
DESCRIPTORS: PINS AND NEEDLES;STABBING
DESCRIPTORS: ACHING;STABBING;TINGLING

## 2023-06-16 ASSESSMENT — PAIN SCALES - GENERAL
PAINLEVEL_OUTOF10: 8
PAINLEVEL_OUTOF10: 8
PAINLEVEL_OUTOF10: 9
PAINLEVEL_OUTOF10: 5
PAINLEVEL_OUTOF10: 9

## 2023-06-16 ASSESSMENT — PAIN DESCRIPTION - LOCATION
LOCATION: PELVIS
LOCATION: FOOT
LOCATION: FOOT;PELVIS
LOCATION: PELVIS
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
## 2023-06-16 NOTE — PLAN OF CARE
Problem: Discharge Planning  Goal: Discharge to home or other facility with appropriate resources  Outcome: Completed  Flowsheets (Taken 6/16/2023 0815)  Discharge to home or other facility with appropriate resources: Identify barriers to discharge with patient and caregiver     Problem: Safety - Adult  Goal: Free from fall injury  Outcome: Completed     Problem: Pain  Goal: Verbalizes/displays adequate comfort level or baseline comfort level  Outcome: Completed     Problem: Skin/Tissue Integrity  Goal: Absence of new skin breakdown  Description: 1. Monitor for areas of redness and/or skin breakdown  2. Assess vascular access sites hourly  3. Every 4-6 hours minimum:  Change oxygen saturation probe site  4. Every 4-6 hours:  If on nasal continuous positive airway pressure, respiratory therapy assess nares and determine need for appliance change or resting period.   Outcome: Completed     Problem: ABCDS Injury Assessment  Goal: Absence of physical injury  Outcome: Completed  Flowsheets (Taken 6/16/2023 1104)  Absence of Physical Injury: Implement safety measures based on patient assessment     Problem: Nutrition Deficit:  Goal: Optimize nutritional status  Outcome: Completed

## 2023-06-19 DIAGNOSIS — S32.9XXA CLOSED DISPLACED FRACTURE OF PELVIS, UNSPECIFIED PART OF PELVIS, INITIAL ENCOUNTER (HCC): ICD-10-CM

## 2023-06-19 DIAGNOSIS — S72.91XF: Primary | ICD-10-CM

## 2023-06-19 DIAGNOSIS — T07.XXXA MULTIPLE TRAUMA: ICD-10-CM

## 2023-06-19 NOTE — TELEPHONE ENCOUNTER
Patients mother called he was released from the hospital on Friday but does not have anything for pain control, the pain is horrible  is there something that can be done for the pain

## 2023-06-19 NOTE — TELEPHONE ENCOUNTER
Spoke with patients mother, Boaz, states that she has called several Drs in regards to her sons care. The patient is in extreme pain and the medication he was given is not helping at all. Patients medication was prescribed by Reg Diallo in patient. Neurosurgery has not prescribed medications, managing his compression fractures with TLSO brace. Patient does have upcoming appointment with Orthopedic surgery on 6/26. Boza would like to speak to Dr Chata Durán. Boaz can be reached at [phone].     Message forwarded to Dr Chata Durán for review    Electronically signed by Lisbeth Dejesus CMA on 6/19/23 at 3:25 PM EDT

## 2023-06-20 ENCOUNTER — TELEPHONE (OUTPATIENT)
Dept: SURGERY | Age: 38
End: 2023-06-20

## 2023-06-20 ENCOUNTER — TELEPHONE (OUTPATIENT)
Dept: PHYSICAL MEDICINE AND REHAB | Age: 38
End: 2023-06-20

## 2023-06-20 DIAGNOSIS — S72.91XF: Primary | ICD-10-CM

## 2023-06-20 RX ORDER — ACETAMINOPHEN 500 MG
1000 TABLET ORAL EVERY 6 HOURS PRN
Qty: 240 TABLET | Refills: 1 | Status: SHIPPED
Start: 2023-06-20 | End: 2023-06-21 | Stop reason: SDUPTHER

## 2023-06-20 RX ORDER — METHOCARBAMOL 750 MG/1
1500 TABLET, FILM COATED ORAL 3 TIMES DAILY
Qty: 90 TABLET | Refills: 0 | Status: SHIPPED
Start: 2023-06-20 | End: 2023-06-21 | Stop reason: SDUPTHER

## 2023-06-20 RX ORDER — OXYCODONE HYDROCHLORIDE 5 MG/1
5 TABLET ORAL EVERY 6 HOURS PRN
Qty: 28 TABLET | Refills: 0 | Status: SHIPPED
Start: 2023-06-23 | End: 2023-06-21 | Stop reason: SDUPTHER

## 2023-06-20 NOTE — TELEPHONE ENCOUNTER
I reached out to patients mother concerning several messages made to Neuro team that in basket message routed to Dr Fredy Coats. The discharge orders specifically stated that Dr Fredy Coats does not follow or treat post op pain on an out patient basis. Reena was also confused about who to call for pain control. She contacted Franklyn Simon several times without any direction to who to call which would be the Ortho doctor. Patient stated  that she was told to contact Dr Fredy Coats . Dr Fredy Coats addressed the message to be[employer]ruited and myself. I went over the discharge orders with [facility]. and will follow up and call Ortho for pain control.      Mariela Boyer MA

## 2023-06-20 NOTE — TELEPHONE ENCOUNTER
MA received a call from patients mother, Vera Toure, looking to schedule follow up with Dr. Nila Dias for \"unrelenting pain\". MA explained to patients mother I spoke with patients wife yesterday and explained the patient did not need to follow up with our office as out patient, patient is to follow up with Orthopedics and Neurosurgery due to type of injury. Patients mother states \"the discharged instructions say to call this office if needed. We have called the orthopedic office and left a message. On the message it says it could take up to 3 days for them to get back to us. Can you contact them to have this taken care of today? \" MA explained I could document that I received a call from patients mother but I can not guarantee a return call today as I do not work for [employer]. Patients mother proceeded to describe the patients pain as \"unrelenting pain started in left foot now into left calf\". Patients mother denies that the calf is swollen, red or warm to touch. Patients mother states \" The calf is cold to touch, feeling of a warming sensation and tingling\". MA explained again the patient needs to contact Neurosurgery and Orthopedics in regards to pain control, especially because Orthopedics preformed surgery. MA explained I would document the information this way Ortho can see when she had said to me.    Patients mother understood and can be reached at [phone]  Electronically signed by Zoila Grumbles, [address] on 6/20/23 at 9:03 AM EDT

## 2023-06-20 NOTE — TELEPHONE ENCOUNTER
Patient's mother called and left a message stating that patient has been having \"severe, extreme nerve pain in his L foot\" she states \"it has been going on for 3 weeks\" and \"he was given Cymbalta and Lyrica but nothing is helping, 9-10 and 10-10 pain. \"  States that patient is unable to sleep due to pain and that she has been calling multiple doctors about the nerve pain. Requests call back.      Future Appointments   Date Time Provider Joyce Torrez   6/23/2023  9:30 AM MD OWEN Muñoz PM [facility] EMERGENCY [facility] AT [place]   6/26/2023 11:00 AM SCHEDULE, SE ORTHO APC SE Ortho [facility]     Routed

## 2023-06-20 NOTE — TELEPHONE ENCOUNTER
[facility] with concern for patient's pain complaint. Patient is reporting a 10-10 nerve pain mainly at night and is unable to sleep due to it. Yahaira Rodriguez reports that patient is unable to function during the day because he is not sleeping at night. Requests call back.

## 2023-06-20 NOTE — TELEPHONE ENCOUNTER
Received message from [facility] with [facility] requesting call back regarding patient.

## 2023-06-20 NOTE — TELEPHONE ENCOUNTER
Called Pedro Luis Zheng back, no answer. Left message advising that we have been informed of patient issue and that we have sent over additional and altered medication to attempt to help with patient's pain. We have also tried to call and myChart patient. Advised to call with any further issues.      Future Appointments   Date Time Provider Joyce Torrez   6/23/2023  9:30 AM [facility] AT [place]   6/26/2023 11:00 AM SCHEDULE, SE ORTHO APC SE Ortho [facility]

## 2023-06-20 NOTE — TELEPHONE ENCOUNTER
Attempted to call patient back at number listed in chart and provided by mother as call back number, the number rings as if the phone is off the hook. Will continue to try and contact patient.      Future Appointments   Date Time Provider Joyce Torrez   6/23/2023  9:30 AM MD OWEN [place] [facility] EMERGENCY [facility] AT [place]   6/26/2023 11:00 AM SCHEDULE, SE ORTHO APC SE Orth[facility]

## 2023-06-21 DIAGNOSIS — T14.90XA TRAUMA: Primary | ICD-10-CM

## 2023-06-21 RX ORDER — OXYCODONE HYDROCHLORIDE 5 MG/1
5 TABLET ORAL EVERY 6 HOURS PRN
Qty: 28 TABLET | Refills: 0 | Status: SHIPPED | OUTPATIENT
Start: 2023-06-23 | End: 2023-06-30

## 2023-06-21 RX ORDER — ACETAMINOPHEN 500 MG
1000 TABLET ORAL EVERY 6 HOURS PRN
Qty: 360 TABLET | Refills: 1 | Status: SHIPPED | OUTPATIENT
Start: 2023-06-21

## 2023-06-21 RX ORDER — METHOCARBAMOL 750 MG/1
1500 TABLET, FILM COATED ORAL 3 TIMES DAILY
Qty: 180 TABLET | Refills: 0 | Status: SHIPPED | OUTPATIENT
Start: 2023-06-21 | End: 2023-07-21

## 2023-06-21 NOTE — TELEPHONE ENCOUNTER
Called and spoke with patient about keeping his follow up appointment for 6/26/23 since we will not have providers available for late in the day on 6/23/23. Patient verbalized understanding. While speaking with patient I did review with him the order for oxycodone that is available for  on 6/23/23 at [facility]. Patient reports that he is staying with his family in [facility] and needs orders sent to the [facility] there. Changed patient's pharmacy at this time.      Future Appointments   Date Time Provider Joyce Torrez   6/23/2023  9:30 AM MD OWEN Bartlett [facility] [address] EMERGENCY [facility] AT [place]   6/26/2023 11:00 AM SCHEDULE, SE ORTHO [facility]     Pend and routed

## 2023-06-21 NOTE — TELEPHONE ENCOUNTER
Medications sent to pharmacy. Controlled Substance Monitoring:    Acute and Chronic Pain Monitoring:   RX Monitoring 6/21/2023   Periodic Controlled Substance Monitoring No signs of potential drug abuse or diversion identified.

## 2023-06-23 ENCOUNTER — INITIAL CONSULT (OUTPATIENT)
Dept: PAIN MANAGEMENT | Age: 38
End: 2023-06-23

## 2023-06-23 VITALS
BODY MASS INDEX: 23.7 KG/M2 | HEART RATE: 119 BPM | TEMPERATURE: 97.2 F | OXYGEN SATURATION: 99 % | HEIGHT: 72 IN | SYSTOLIC BLOOD PRESSURE: 128 MMHG | WEIGHT: 175 LBS | DIASTOLIC BLOOD PRESSURE: 86 MMHG

## 2023-06-23 DIAGNOSIS — S72.91XF: ICD-10-CM

## 2023-06-23 RX ORDER — DULOXETIN HYDROCHLORIDE 30 MG/1
30 CAPSULE, DELAYED RELEASE ORAL 2 TIMES DAILY
Qty: 60 CAPSULE | Refills: 3 | Status: SHIPPED | OUTPATIENT
Start: 2023-06-23 | End: 2023-10-21

## 2023-06-23 ASSESSMENT — ENCOUNTER SYMPTOMS
ALLERGIC/IMMUNOLOGIC NEGATIVE: 1
GASTROINTESTINAL NEGATIVE: 1
RESPIRATORY NEGATIVE: 1
EYES NEGATIVE: 1

## 2023-06-23 NOTE — PROGRESS NOTES
disoriented. Psychiatric:         Mood and Affect: Mood normal.         Behavior: Behavior normal.         Thought Content: Thought content normal.         Judgment: Judgment normal.         Plan   Will continue current meds, increase his Cymbalta and follow.

## 2023-06-26 ENCOUNTER — OFFICE VISIT (OUTPATIENT)
Dept: ORTHOPEDIC SURGERY | Age: 38
End: 2023-06-26
Payer: COMMERCIAL

## 2023-06-26 ENCOUNTER — HOSPITAL ENCOUNTER (OUTPATIENT)
Dept: GENERAL RADIOLOGY | Age: 38
Discharge: HOME | End: 2023-06-28
Payer: COMMERCIAL

## 2023-06-26 ENCOUNTER — TELEPHONE (OUTPATIENT)
Dept: ORTHOPEDIC SURGERY | Age: 38
End: 2023-06-26

## 2023-06-26 VITALS — HEIGHT: 72 IN | BODY MASS INDEX: 23.7 KG/M2 | WEIGHT: 175 LBS

## 2023-06-26 DIAGNOSIS — S72.91XF: Primary | ICD-10-CM

## 2023-06-26 DIAGNOSIS — S32.9XXD CLOSED DISPLACED FRACTURE OF PELVIS WITH ROUTINE HEALING, UNSPECIFIED PART OF PELVIS, SUBSEQUENT ENCOUNTER: ICD-10-CM

## 2023-06-26 DIAGNOSIS — S32.411D CLOSED DISPLACED FRACTURE OF ANTERIOR WALL OF RIGHT ACETABULUM WITH ROUTINE HEALING, SUBSEQUENT ENCOUNTER: ICD-10-CM

## 2023-06-26 DIAGNOSIS — S32.592A CLOSED FRACTURE OF MULTIPLE PUBIC RAMI, LEFT, INITIAL ENCOUNTER (HCC): ICD-10-CM

## 2023-06-26 DIAGNOSIS — T14.90XA TRAUMA: ICD-10-CM

## 2023-06-26 PROCEDURE — 99214 OFFICE O/P EST MOD 30 MIN: CPT

## 2023-06-26 PROCEDURE — 99024 POSTOP FOLLOW-UP VISIT: CPT | Performed by: PHYSICIAN ASSISTANT

## 2023-06-26 PROCEDURE — 72170 X-RAY EXAM OF PELVIS: CPT

## 2023-06-26 PROCEDURE — 73552 X-RAY EXAM OF FEMUR 2/>: CPT

## 2023-06-26 RX ORDER — ASPIRIN 325 MG
325 TABLET ORAL 2 TIMES DAILY
Qty: 60 TABLET | Refills: 2 | Status: ON HOLD | OUTPATIENT
Start: 2023-06-26

## 2023-06-26 RX ORDER — OXYCODONE HYDROCHLORIDE AND ACETAMINOPHEN 5; 325 MG/1; MG/1
1 TABLET ORAL EVERY 6 HOURS PRN
Qty: 28 TABLET | Refills: 0 | Status: ON HOLD | OUTPATIENT
Start: 2023-06-26 | End: 2023-07-03

## 2023-06-27 ENCOUNTER — PREP FOR PROCEDURE (OUTPATIENT)
Dept: ORTHOPEDIC SURGERY | Age: 38
End: 2023-06-27

## 2023-06-27 ENCOUNTER — TELEPHONE (OUTPATIENT)
Dept: ORTHOPEDIC SURGERY | Age: 38
End: 2023-06-27

## 2023-06-27 DIAGNOSIS — S32.592A CLOSED FRACTURE OF MULTIPLE PUBIC RAMI, LEFT, INITIAL ENCOUNTER (HCC): Primary | ICD-10-CM

## 2023-06-27 DIAGNOSIS — S32.9XXD CLOSED DISPLACED FRACTURE OF PELVIS WITH ROUTINE HEALING, UNSPECIFIED PART OF PELVIS, SUBSEQUENT ENCOUNTER: ICD-10-CM

## 2023-06-27 DIAGNOSIS — S32.411D CLOSED DISPLACED FRACTURE OF ANTERIOR WALL OF RIGHT ACETABULUM WITH ROUTINE HEALING, SUBSEQUENT ENCOUNTER: ICD-10-CM

## 2023-06-27 RX ORDER — SODIUM CHLORIDE 0.9 % (FLUSH) 0.9 %
5-40 SYRINGE (ML) INJECTION EVERY 12 HOURS SCHEDULED
Status: CANCELLED | OUTPATIENT
Start: 2023-06-27

## 2023-06-27 RX ORDER — SODIUM CHLORIDE 9 MG/ML
INJECTION, SOLUTION INTRAVENOUS PRN
Status: CANCELLED | OUTPATIENT
Start: 2023-06-27

## 2023-06-27 RX ORDER — SODIUM CHLORIDE 0.9 % (FLUSH) 0.9 %
5-40 SYRINGE (ML) INJECTION PRN
Status: CANCELLED | OUTPATIENT
Start: 2023-06-27

## 2023-06-27 RX ORDER — SODIUM CHLORIDE 9 MG/ML
INJECTION, SOLUTION INTRAVENOUS CONTINUOUS
Status: CANCELLED | OUTPATIENT
Start: 2023-06-27
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
## 2023-06-29 ENCOUNTER — ANESTHESIA EVENT (OUTPATIENT)
Dept: OPERATING ROOM | Age: 38
End: 2023-06-29
Payer: COMMERCIAL

## 2023-06-30 ENCOUNTER — HOSPITAL ENCOUNTER (OUTPATIENT)
Age: 38
Setting detail: OUTPATIENT SURGERY
Discharge: HOME | End: 2023-06-30
Attending: ORTHOPAEDIC SURGERY | Admitting: ORTHOPAEDIC SURGERY
Payer: COMMERCIAL

## 2023-06-30 ENCOUNTER — APPOINTMENT (OUTPATIENT)
Dept: GENERAL RADIOLOGY | Age: 38
End: 2023-06-30
Attending: ORTHOPAEDIC SURGERY
Payer: COMMERCIAL

## 2023-06-30 ENCOUNTER — ANESTHESIA (OUTPATIENT)
Dept: OPERATING ROOM | Age: 38
End: 2023-06-30
Payer: COMMERCIAL

## 2023-06-30 VITALS
RESPIRATION RATE: 18 BRPM | OXYGEN SATURATION: 98 % | BODY MASS INDEX: 23.7 KG/M2 | HEIGHT: 72 IN | SYSTOLIC BLOOD PRESSURE: 143 MMHG | DIASTOLIC BLOOD PRESSURE: 79 MMHG | HEART RATE: 82 BPM | TEMPERATURE: 97.6 F | WEIGHT: 175 LBS

## 2023-06-30 LAB
ANION GAP SERPL CALCULATED.3IONS-SCNC: 14 MMOL/L (ref 7–16)
BUN SERPL-MCNC: 10 MG/DL (ref 6–20)
CALCIUM SERPL-MCNC: 9.3 MG/DL (ref 8.6–10.2)
CHLORIDE SERPL-SCNC: 102 MMOL/L (ref 98–107)
CO2 SERPL-SCNC: 24 MMOL/L (ref 22–29)
CREAT SERPL-MCNC: 0.5 MG/DL (ref 0.7–1.2)
ERYTHROCYTE [DISTWIDTH] IN BLOOD BY AUTOMATED COUNT: 13.5 FL (ref 11.5–15)
GLUCOSE SERPL-MCNC: 106 MG/DL (ref 74–99)
HCT VFR BLD AUTO: 40.5 % (ref 37–54)
HGB BLD-MCNC: 12.7 G/DL (ref 12.5–16.5)
MCH RBC QN AUTO: 29 PG (ref 26–35)
MCHC RBC AUTO-ENTMCNC: 31.4 % (ref 32–34.5)
MCV RBC AUTO: 92.5 FL (ref 80–99.9)
PLATELET # BLD AUTO: 364 E9/L (ref 130–450)
PMV BLD AUTO: 9.4 FL (ref 7–12)
POTASSIUM SERPL-SCNC: 3.7 MMOL/L (ref 3.5–5)
RBC # BLD AUTO: 4.38 E12/L (ref 3.8–5.8)
SODIUM SERPL-SCNC: 140 MMOL/L (ref 132–146)
WBC # BLD: 3.1 E9/L (ref 4.5–11.5)

## 2023-06-30 PROCEDURE — 2709999900 HC NON-CHARGEABLE SUPPLY: Performed by: ORTHOPAEDIC SURGERY

## 2023-06-30 PROCEDURE — 3700000000 HC ANESTHESIA ATTENDED CARE: Performed by: ORTHOPAEDIC SURGERY

## 2023-06-30 PROCEDURE — 3700000001 HC ADD 15 MINUTES (ANESTHESIA): Performed by: ORTHOPAEDIC SURGERY

## 2023-06-30 PROCEDURE — 7100000010 HC PHASE II RECOVERY - FIRST 15 MIN: Performed by: ORTHOPAEDIC SURGERY

## 2023-06-30 PROCEDURE — 2580000003 HC RX 258

## 2023-06-30 PROCEDURE — 2580000003 HC RX 258: Performed by: PHYSICIAN ASSISTANT

## 2023-06-30 PROCEDURE — 20694 RMVL EXT FIXJ SYS UNDER ANES: CPT | Performed by: ORTHOPAEDIC SURGERY

## 2023-06-30 PROCEDURE — 85027 COMPLETE CBC AUTOMATED: CPT

## 2023-06-30 PROCEDURE — 3209999900 FLUORO FOR SURGICAL PROCEDURES

## 2023-06-30 PROCEDURE — 6360000002 HC RX W HCPCS

## 2023-06-30 PROCEDURE — 77071 MNL APPL STRS JT RADIOGRAPHY: CPT | Performed by: ORTHOPAEDIC SURGERY

## 2023-06-30 PROCEDURE — 7100000011 HC PHASE II RECOVERY - ADDTL 15 MIN: Performed by: ORTHOPAEDIC SURGERY

## 2023-06-30 PROCEDURE — 3600000002 HC SURGERY LEVEL 2 BASE: Performed by: ORTHOPAEDIC SURGERY

## 2023-06-30 PROCEDURE — 80048 BASIC METABOLIC PNL TOTAL CA: CPT

## 2023-06-30 PROCEDURE — 6360000002 HC RX W HCPCS: Performed by: NURSE ANESTHETIST, CERTIFIED REGISTERED

## 2023-06-30 PROCEDURE — 72170 X-RAY EXAM OF PELVIS: CPT

## 2023-06-30 PROCEDURE — 3600000012 HC SURGERY LEVEL 2 ADDTL 15MIN: Performed by: ORTHOPAEDIC SURGERY

## 2023-06-30 RX ORDER — SODIUM CHLORIDE 0.9 % (FLUSH) 0.9 %
5-40 SYRINGE (ML) INJECTION PRN
Status: DISCONTINUED | OUTPATIENT
Start: 2023-06-30 | End: 2023-06-30 | Stop reason: HOSPADM

## 2023-06-30 RX ORDER — FENTANYL CITRATE 50 UG/ML
INJECTION, SOLUTION INTRAMUSCULAR; INTRAVENOUS PRN
Status: DISCONTINUED | OUTPATIENT
Start: 2023-06-30 | End: 2023-06-30 | Stop reason: SDUPTHER

## 2023-06-30 RX ORDER — SODIUM CHLORIDE 9 MG/ML
INJECTION, SOLUTION INTRAVENOUS CONTINUOUS
Status: DISCONTINUED | OUTPATIENT
Start: 2023-06-30 | End: 2023-06-30 | Stop reason: HOSPADM

## 2023-06-30 RX ORDER — MIDAZOLAM HYDROCHLORIDE 1 MG/ML
INJECTION INTRAMUSCULAR; INTRAVENOUS PRN
Status: DISCONTINUED | OUTPATIENT
Start: 2023-06-30 | End: 2023-06-30 | Stop reason: SDUPTHER

## 2023-06-30 RX ORDER — SODIUM CHLORIDE 9 MG/ML
INJECTION, SOLUTION INTRAVENOUS PRN
Status: DISCONTINUED | OUTPATIENT
Start: 2023-06-30 | End: 2023-06-30 | Stop reason: HOSPADM

## 2023-06-30 RX ORDER — SODIUM CHLORIDE 0.9 % (FLUSH) 0.9 %
5-40 SYRINGE (ML) INJECTION EVERY 12 HOURS SCHEDULED
Status: DISCONTINUED | OUTPATIENT
Start: 2023-06-30 | End: 2023-06-30 | Stop reason: HOSPADM

## 2023-06-30 RX ORDER — MEPERIDINE HYDROCHLORIDE 25 MG/ML
12.5 INJECTION INTRAMUSCULAR; INTRAVENOUS; SUBCUTANEOUS EVERY 5 MIN PRN
Status: DISCONTINUED | OUTPATIENT
Start: 2023-06-30 | End: 2023-06-30 | Stop reason: HOSPADM

## 2023-06-30 RX ORDER — HYDROMORPHONE HYDROCHLORIDE 1 MG/ML
0.5 INJECTION, SOLUTION INTRAMUSCULAR; INTRAVENOUS; SUBCUTANEOUS EVERY 5 MIN PRN
Status: DISCONTINUED | OUTPATIENT
Start: 2023-06-30 | End: 2023-06-30 | Stop reason: HOSPADM

## 2023-06-30 RX ORDER — PROPOFOL 10 MG/ML
INJECTION, EMULSION INTRAVENOUS CONTINUOUS PRN
Status: DISCONTINUED | OUTPATIENT
Start: 2023-06-30 | End: 2023-06-30 | Stop reason: SDUPTHER

## 2023-06-30 RX ORDER — PROPOFOL 10 MG/ML
INJECTION, EMULSION INTRAVENOUS PRN
Status: DISCONTINUED | OUTPATIENT
Start: 2023-06-30 | End: 2023-06-30

## 2023-06-30 RX ADMIN — MIDAZOLAM 2 MG: 1 INJECTION INTRAMUSCULAR; INTRAVENOUS at 09:08

## 2023-06-30 RX ADMIN — CEFAZOLIN 2000 MG: 2 INJECTION, POWDER, FOR SOLUTION INTRAMUSCULAR; INTRAVENOUS at 09:21

## 2023-06-30 RX ADMIN — FENTANYL CITRATE 50 MCG: 50 INJECTION, SOLUTION INTRAMUSCULAR; INTRAVENOUS at 09:16

## 2023-06-30 RX ADMIN — PROPOFOL 150 MCG/KG/MIN: 10 INJECTION, EMULSION INTRAVENOUS at 09:17

## 2023-06-30 RX ADMIN — SODIUM CHLORIDE: 9 INJECTION, SOLUTION INTRAVENOUS at 05:46

## 2023-06-30 RX ADMIN — FENTANYL CITRATE 50 MCG: 50 INJECTION, SOLUTION INTRAMUSCULAR; INTRAVENOUS at 09:10

## 2023-06-30 RX ADMIN — FENTANYL CITRATE 50 MCG: 50 INJECTION, SOLUTION INTRAMUSCULAR; INTRAVENOUS at 09:24

## 2023-06-30 RX ADMIN — FENTANYL CITRATE 50 MCG: 50 INJECTION, SOLUTION INTRAMUSCULAR; INTRAVENOUS at 09:20

## 2023-06-30 RX ADMIN — PROPOFOL 50 MG: 10 INJECTION, EMULSION INTRAVENOUS at 09:16

## 2023-06-30 ASSESSMENT — PAIN - FUNCTIONAL ASSESSMENT: PAIN_FUNCTIONAL_ASSESSMENT: 0-10

## 2023-07-02 ENCOUNTER — APPOINTMENT (OUTPATIENT)
Dept: GENERAL RADIOLOGY | Age: 38
DRG: 392 | End: 2023-07-02
Payer: COMMERCIAL

## 2023-07-02 ENCOUNTER — APPOINTMENT (OUTPATIENT)
Dept: CT IMAGING | Age: 38
DRG: 392 | End: 2023-07-02
Payer: COMMERCIAL

## 2023-07-02 ENCOUNTER — HOSPITAL ENCOUNTER (INPATIENT)
Age: 38
LOS: 2 days | Discharge: HOME | DRG: 392 | End: 2023-07-04
Attending: SURGERY | Admitting: SURGERY
Payer: COMMERCIAL

## 2023-07-02 DIAGNOSIS — K56.609 SMALL BOWEL OBSTRUCTION (HCC): Primary | ICD-10-CM

## 2023-07-02 DIAGNOSIS — G89.18 POST-OP PAIN: ICD-10-CM

## 2023-07-02 LAB
ALBUMIN SERPL-MCNC: 4 G/DL (ref 3.5–4.6)
ALP SERPL-CCNC: 110 U/L (ref 35–104)
ALT SERPL-CCNC: 11 U/L (ref 0–41)
ANION GAP SERPL CALCULATED.3IONS-SCNC: 13 MEQ/L (ref 9–15)
AST SERPL-CCNC: 10 U/L (ref 0–40)
BASOPHILS # BLD: 0 K/UL (ref 0–0.2)
BASOPHILS NFR BLD: 0.3 %
BILIRUB SERPL-MCNC: 0.4 MG/DL (ref 0.2–0.7)
BILIRUB UR QL STRIP: NEGATIVE
BUN SERPL-MCNC: 8 MG/DL (ref 6–20)
CALCIUM SERPL-MCNC: 9.8 MG/DL (ref 8.5–9.9)
CHLORIDE SERPL-SCNC: 95 MEQ/L (ref 95–107)
CLARITY UR: CLEAR
CO2 SERPL-SCNC: 23 MEQ/L (ref 20–31)
COLOR UR: YELLOW
CREAT SERPL-MCNC: 0.59 MG/DL (ref 0.7–1.2)
EOSINOPHIL # BLD: 0 K/UL (ref 0–0.7)
EOSINOPHIL NFR BLD: 0 %
ERYTHROCYTE [DISTWIDTH] IN BLOOD BY AUTOMATED COUNT: 14.9 % (ref 11.5–14.5)
GLOBULIN SER CALC-MCNC: 3.2 G/DL (ref 2.3–3.5)
GLUCOSE SERPL-MCNC: 121 MG/DL (ref 70–99)
GLUCOSE UR STRIP-MCNC: NEGATIVE MG/DL
HCT VFR BLD AUTO: 40.5 % (ref 42–52)
HGB BLD-MCNC: 13.6 G/DL (ref 14–18)
HGB UR QL STRIP: NEGATIVE
KETONES UR STRIP-MCNC: 15 MG/DL
LACTATE BLDV-SCNC: 2.1 MMOL/L (ref 0.5–2.2)
LEUKOCYTE ESTERASE UR QL STRIP: NEGATIVE
LIPASE SERPL-CCNC: 26 U/L (ref 12–95)
LYMPHOCYTES # BLD: 0.8 K/UL (ref 1–4.8)
LYMPHOCYTES NFR BLD: 8.6 %
MCH RBC QN AUTO: 29.7 PG (ref 27–31.3)
MCHC RBC AUTO-ENTMCNC: 33.6 % (ref 33–37)
MCV RBC AUTO: 88.5 FL (ref 79–92.2)
MONOCYTES # BLD: 0.8 K/UL (ref 0.2–0.8)
MONOCYTES NFR BLD: 8.2 %
NEUTROPHILS # BLD: 7.7 K/UL (ref 1.4–6.5)
NEUTS SEG NFR BLD: 82.9 %
NITRITE UR QL STRIP: NEGATIVE
PH UR STRIP: 7 [PH] (ref 5–9)
PLATELET # BLD AUTO: 466 K/UL (ref 130–400)
POTASSIUM SERPL-SCNC: 3.7 MEQ/L (ref 3.4–4.9)
PROCALCITONIN SERPL IA-MCNC: 0.12 NG/ML (ref 0–0.15)
PROT SERPL-MCNC: 7.2 G/DL (ref 6.3–8)
PROT UR STRIP-MCNC: NEGATIVE MG/DL
RBC # BLD AUTO: 4.58 M/UL (ref 4.7–6.1)
SODIUM SERPL-SCNC: 131 MEQ/L (ref 135–144)
SP GR UR STRIP: 1.01 (ref 1–1.03)
URINE REFLEX TO CULTURE: ABNORMAL
UROBILINOGEN UR STRIP-ACNC: 0.2 E.U./DL
WBC # BLD AUTO: 9.3 K/UL (ref 4.8–10.8)

## 2023-07-02 PROCEDURE — 6360000004 HC RX CONTRAST MEDICATION: Performed by: PHYSICIAN ASSISTANT

## 2023-07-02 PROCEDURE — 2500000003 HC RX 250 WO HCPCS: Performed by: PHYSICIAN ASSISTANT

## 2023-07-02 PROCEDURE — 1210000000 HC MED SURG R&B

## 2023-07-02 PROCEDURE — 74177 CT ABD & PELVIS W/CONTRAST: CPT

## 2023-07-02 PROCEDURE — 2580000003 HC RX 258: Performed by: PHYSICIAN ASSISTANT

## 2023-07-02 PROCEDURE — 96374 THER/PROPH/DIAG INJ IV PUSH: CPT

## 2023-07-02 PROCEDURE — 36415 COLL VENOUS BLD VENIPUNCTURE: CPT

## 2023-07-02 PROCEDURE — 83605 ASSAY OF LACTIC ACID: CPT

## 2023-07-02 PROCEDURE — 6360000002 HC RX W HCPCS: Performed by: PHYSICIAN ASSISTANT

## 2023-07-02 PROCEDURE — A4216 STERILE WATER/SALINE, 10 ML: HCPCS | Performed by: PHYSICIAN ASSISTANT

## 2023-07-02 PROCEDURE — 84145 PROCALCITONIN (PCT): CPT

## 2023-07-02 PROCEDURE — 2580000003 HC RX 258: Performed by: SURGERY

## 2023-07-02 PROCEDURE — 99285 EMERGENCY DEPT VISIT HI MDM: CPT

## 2023-07-02 PROCEDURE — 85025 COMPLETE CBC W/AUTO DIFF WBC: CPT

## 2023-07-02 PROCEDURE — 6360000002 HC RX W HCPCS: Performed by: SURGERY

## 2023-07-02 PROCEDURE — 71045 X-RAY EXAM CHEST 1 VIEW: CPT

## 2023-07-02 PROCEDURE — 96361 HYDRATE IV INFUSION ADD-ON: CPT

## 2023-07-02 PROCEDURE — 83690 ASSAY OF LIPASE: CPT

## 2023-07-02 PROCEDURE — 87040 BLOOD CULTURE FOR BACTERIA: CPT

## 2023-07-02 PROCEDURE — 80053 COMPREHEN METABOLIC PANEL: CPT

## 2023-07-02 PROCEDURE — 96375 TX/PRO/DX INJ NEW DRUG ADDON: CPT

## 2023-07-02 PROCEDURE — 81003 URINALYSIS AUTO W/O SCOPE: CPT

## 2023-07-02 RX ORDER — 0.9 % SODIUM CHLORIDE 0.9 %
2000 INTRAVENOUS SOLUTION INTRAVENOUS ONCE
Status: COMPLETED | OUTPATIENT
Start: 2023-07-02 | End: 2023-07-02

## 2023-07-02 RX ORDER — PREGABALIN 100 MG/1
200 CAPSULE ORAL 3 TIMES DAILY
Status: DISCONTINUED | OUTPATIENT
Start: 2023-07-03 | End: 2023-07-04 | Stop reason: HOSPADM

## 2023-07-02 RX ORDER — SODIUM CHLORIDE 9 MG/ML
INJECTION, SOLUTION INTRAVENOUS PRN
Status: DISCONTINUED | OUTPATIENT
Start: 2023-07-02 | End: 2023-07-04 | Stop reason: HOSPADM

## 2023-07-02 RX ORDER — DICYCLOMINE HYDROCHLORIDE 10 MG/ML
20 INJECTION INTRAMUSCULAR ONCE
Status: DISCONTINUED | OUTPATIENT
Start: 2023-07-02 | End: 2023-07-03

## 2023-07-02 RX ORDER — SODIUM CHLORIDE, SODIUM LACTATE, POTASSIUM CHLORIDE, CALCIUM CHLORIDE 600; 310; 30; 20 MG/100ML; MG/100ML; MG/100ML; MG/100ML
INJECTION, SOLUTION INTRAVENOUS CONTINUOUS
Status: DISCONTINUED | OUTPATIENT
Start: 2023-07-02 | End: 2023-07-04 | Stop reason: HOSPADM

## 2023-07-02 RX ORDER — MORPHINE SULFATE 4 MG/ML
4 INJECTION, SOLUTION INTRAMUSCULAR; INTRAVENOUS
Status: DISCONTINUED | OUTPATIENT
Start: 2023-07-02 | End: 2023-07-03

## 2023-07-02 RX ORDER — ONDANSETRON 2 MG/ML
4 INJECTION INTRAMUSCULAR; INTRAVENOUS EVERY 6 HOURS PRN
Status: DISCONTINUED | OUTPATIENT
Start: 2023-07-02 | End: 2023-07-04 | Stop reason: HOSPADM

## 2023-07-02 RX ORDER — MORPHINE SULFATE 4 MG/ML
4 INJECTION, SOLUTION INTRAMUSCULAR; INTRAVENOUS ONCE
Status: COMPLETED | OUTPATIENT
Start: 2023-07-02 | End: 2023-07-02

## 2023-07-02 RX ORDER — HYDROMORPHONE HYDROCHLORIDE 1 MG/ML
0.5 INJECTION, SOLUTION INTRAMUSCULAR; INTRAVENOUS; SUBCUTANEOUS ONCE
Status: COMPLETED | OUTPATIENT
Start: 2023-07-02 | End: 2023-07-02

## 2023-07-02 RX ORDER — DULOXETIN HYDROCHLORIDE 30 MG/1
30 CAPSULE, DELAYED RELEASE ORAL 2 TIMES DAILY
Status: DISCONTINUED | OUTPATIENT
Start: 2023-07-03 | End: 2023-07-04 | Stop reason: HOSPADM

## 2023-07-02 RX ORDER — SODIUM CHLORIDE 0.9 % (FLUSH) 0.9 %
5-40 SYRINGE (ML) INJECTION PRN
Status: DISCONTINUED | OUTPATIENT
Start: 2023-07-02 | End: 2023-07-04 | Stop reason: HOSPADM

## 2023-07-02 RX ORDER — ONDANSETRON 2 MG/ML
4 INJECTION INTRAMUSCULAR; INTRAVENOUS ONCE
Status: COMPLETED | OUTPATIENT
Start: 2023-07-02 | End: 2023-07-02

## 2023-07-02 RX ORDER — SODIUM CHLORIDE 0.9 % (FLUSH) 0.9 %
5-40 SYRINGE (ML) INJECTION EVERY 12 HOURS SCHEDULED
Status: DISCONTINUED | OUTPATIENT
Start: 2023-07-02 | End: 2023-07-04 | Stop reason: HOSPADM

## 2023-07-02 RX ORDER — MORPHINE SULFATE 2 MG/ML
2 INJECTION, SOLUTION INTRAMUSCULAR; INTRAVENOUS
Status: DISCONTINUED | OUTPATIENT
Start: 2023-07-02 | End: 2023-07-03

## 2023-07-02 RX ADMIN — HYDROMORPHONE HYDROCHLORIDE 0.5 MG: 1 INJECTION, SOLUTION INTRAMUSCULAR; INTRAVENOUS; SUBCUTANEOUS at 19:38

## 2023-07-02 RX ADMIN — MORPHINE SULFATE 4 MG: 4 INJECTION INTRAVENOUS at 23:00

## 2023-07-02 RX ADMIN — FAMOTIDINE 20 MG: 10 INJECTION, SOLUTION INTRAVENOUS at 20:18

## 2023-07-02 RX ADMIN — MORPHINE SULFATE 4 MG: 4 INJECTION INTRAVENOUS at 23:09

## 2023-07-02 RX ADMIN — SODIUM CHLORIDE 2000 ML: 9 INJECTION, SOLUTION INTRAVENOUS at 17:11

## 2023-07-02 RX ADMIN — SODIUM CHLORIDE, POTASSIUM CHLORIDE, SODIUM LACTATE AND CALCIUM CHLORIDE: 600; 310; 30; 20 INJECTION, SOLUTION INTRAVENOUS at 23:16

## 2023-07-02 RX ADMIN — MORPHINE SULFATE 4 MG: 4 INJECTION, SOLUTION INTRAMUSCULAR; INTRAVENOUS at 17:46

## 2023-07-02 RX ADMIN — IOPAMIDOL 50 ML: 612 INJECTION, SOLUTION INTRAVENOUS at 18:43

## 2023-07-02 RX ADMIN — ONDANSETRON 4 MG: 2 INJECTION INTRAMUSCULAR; INTRAVENOUS at 17:11

## 2023-07-02 ASSESSMENT — PAIN DESCRIPTION - FREQUENCY: FREQUENCY: CONTINUOUS

## 2023-07-02 ASSESSMENT — ENCOUNTER SYMPTOMS
ABDOMINAL PAIN: 1
BACK PAIN: 0
VOMITING: 1
NAUSEA: 1
BLOOD IN STOOL: 0
SHORTNESS OF BREATH: 0
DIARRHEA: 0

## 2023-07-02 ASSESSMENT — PAIN - FUNCTIONAL ASSESSMENT
PAIN_FUNCTIONAL_ASSESSMENT: 0-10
PAIN_FUNCTIONAL_ASSESSMENT: PREVENTS OR INTERFERES SOME ACTIVE ACTIVITIES AND ADLS

## 2023-07-02 ASSESSMENT — PAIN DESCRIPTION - LOCATION
LOCATION: ABDOMEN;PELVIS
LOCATION: PELVIS
LOCATION: ABDOMEN;PELVIS
LOCATION: ABDOMEN;GENERALIZED;PELVIS

## 2023-07-02 ASSESSMENT — PAIN DESCRIPTION - DESCRIPTORS
DESCRIPTORS: ACHING

## 2023-07-02 ASSESSMENT — PAIN DESCRIPTION - PAIN TYPE: TYPE: ACUTE PAIN

## 2023-07-02 ASSESSMENT — PAIN SCALES - GENERAL
PAINLEVEL_OUTOF10: 10
PAINLEVEL_OUTOF10: 8
PAINLEVEL_OUTOF10: 10
PAINLEVEL_OUTOF10: 10

## 2023-07-02 ASSESSMENT — PAIN DESCRIPTION - ONSET: ONSET: ON-GOING

## 2023-07-03 ENCOUNTER — APPOINTMENT (OUTPATIENT)
Dept: GENERAL RADIOLOGY | Age: 38
DRG: 392 | End: 2023-07-03
Payer: COMMERCIAL

## 2023-07-03 LAB
ANION GAP SERPL CALCULATED.3IONS-SCNC: 12 MEQ/L (ref 9–15)
BUN SERPL-MCNC: 8 MG/DL (ref 6–20)
CALCIUM SERPL-MCNC: 8.4 MG/DL (ref 8.5–9.9)
CHLORIDE SERPL-SCNC: 104 MEQ/L (ref 95–107)
CO2 SERPL-SCNC: 24 MEQ/L (ref 20–31)
CREAT SERPL-MCNC: 0.44 MG/DL (ref 0.7–1.2)
ERYTHROCYTE [DISTWIDTH] IN BLOOD BY AUTOMATED COUNT: 15 % (ref 11.5–14.5)
GLUCOSE SERPL-MCNC: 105 MG/DL (ref 70–99)
HCT VFR BLD AUTO: 34 % (ref 42–52)
HGB BLD-MCNC: 11.2 G/DL (ref 14–18)
MAGNESIUM SERPL-MCNC: 1.9 MG/DL (ref 1.7–2.4)
MCH RBC QN AUTO: 29 PG (ref 27–31.3)
MCHC RBC AUTO-ENTMCNC: 32.9 % (ref 33–37)
MCV RBC AUTO: 88 FL (ref 79–92.2)
PHOSPHATE SERPL-MCNC: 4.2 MG/DL (ref 2.3–4.8)
PLATELET # BLD AUTO: 393 K/UL (ref 130–400)
POTASSIUM SERPL-SCNC: 3.8 MEQ/L (ref 3.4–4.9)
RBC # BLD AUTO: 3.87 M/UL (ref 4.7–6.1)
SODIUM SERPL-SCNC: 140 MEQ/L (ref 135–144)
WBC # BLD AUTO: 4.7 K/UL (ref 4.8–10.8)

## 2023-07-03 PROCEDURE — 99223 1ST HOSP IP/OBS HIGH 75: CPT | Performed by: SURGERY

## 2023-07-03 PROCEDURE — 36415 COLL VENOUS BLD VENIPUNCTURE: CPT

## 2023-07-03 PROCEDURE — 83735 ASSAY OF MAGNESIUM: CPT

## 2023-07-03 PROCEDURE — 74250 X-RAY XM SM INT 1CNTRST STD: CPT

## 2023-07-03 PROCEDURE — 6360000002 HC RX W HCPCS: Performed by: SURGERY

## 2023-07-03 PROCEDURE — 85027 COMPLETE CBC AUTOMATED: CPT

## 2023-07-03 PROCEDURE — 6370000000 HC RX 637 (ALT 250 FOR IP): Performed by: SURGERY

## 2023-07-03 PROCEDURE — 1210000000 HC MED SURG R&B

## 2023-07-03 PROCEDURE — 84100 ASSAY OF PHOSPHORUS: CPT

## 2023-07-03 PROCEDURE — 2580000003 HC RX 258: Performed by: SURGERY

## 2023-07-03 PROCEDURE — 80048 BASIC METABOLIC PNL TOTAL CA: CPT

## 2023-07-03 RX ORDER — ENOXAPARIN SODIUM 100 MG/ML
30 INJECTION SUBCUTANEOUS DAILY
Status: DISCONTINUED | OUTPATIENT
Start: 2023-07-03 | End: 2023-07-04 | Stop reason: HOSPADM

## 2023-07-03 RX ORDER — ACETAMINOPHEN 325 MG/1
325 TABLET ORAL EVERY 6 HOURS PRN
Status: DISCONTINUED | OUTPATIENT
Start: 2023-07-03 | End: 2023-07-04 | Stop reason: HOSPADM

## 2023-07-03 RX ORDER — OXYCODONE HYDROCHLORIDE AND ACETAMINOPHEN 5; 325 MG/1; MG/1
1 TABLET ORAL EVERY 6 HOURS PRN
Status: DISCONTINUED | OUTPATIENT
Start: 2023-07-03 | End: 2023-07-04 | Stop reason: HOSPADM

## 2023-07-03 RX ORDER — METHOCARBAMOL 500 MG/1
1500 TABLET, FILM COATED ORAL 3 TIMES DAILY
Status: DISCONTINUED | OUTPATIENT
Start: 2023-07-03 | End: 2023-07-04 | Stop reason: HOSPADM

## 2023-07-03 RX ORDER — ASPIRIN 325 MG
325 TABLET ORAL EVERY 12 HOURS
Status: DISCONTINUED | OUTPATIENT
Start: 2023-07-03 | End: 2023-07-04 | Stop reason: HOSPADM

## 2023-07-03 RX ADMIN — DULOXETINE HYDROCHLORIDE 30 MG: 30 CAPSULE, DELAYED RELEASE ORAL at 21:32

## 2023-07-03 RX ADMIN — PREGABALIN 200 MG: 100 CAPSULE ORAL at 08:48

## 2023-07-03 RX ADMIN — SODIUM CHLORIDE, PRESERVATIVE FREE 10 ML: 5 INJECTION INTRAVENOUS at 21:39

## 2023-07-03 RX ADMIN — SODIUM CHLORIDE, POTASSIUM CHLORIDE, SODIUM LACTATE AND CALCIUM CHLORIDE: 600; 310; 30; 20 INJECTION, SOLUTION INTRAVENOUS at 21:35

## 2023-07-03 RX ADMIN — MORPHINE SULFATE 2 MG: 2 INJECTION, SOLUTION INTRAMUSCULAR; INTRAVENOUS at 03:47

## 2023-07-03 RX ADMIN — METHOCARBAMOL 1500 MG: 500 TABLET ORAL at 21:32

## 2023-07-03 RX ADMIN — MORPHINE SULFATE 2 MG: 2 INJECTION, SOLUTION INTRAMUSCULAR; INTRAVENOUS at 06:43

## 2023-07-03 RX ADMIN — PREGABALIN 200 MG: 100 CAPSULE ORAL at 15:37

## 2023-07-03 RX ADMIN — DULOXETINE HYDROCHLORIDE 30 MG: 30 CAPSULE, DELAYED RELEASE ORAL at 00:36

## 2023-07-03 RX ADMIN — ASPIRIN 325 MG: 325 TABLET ORAL at 21:32

## 2023-07-03 RX ADMIN — PREGABALIN 200 MG: 100 CAPSULE ORAL at 00:36

## 2023-07-03 RX ADMIN — SODIUM CHLORIDE, PRESERVATIVE FREE 10 ML: 5 INJECTION INTRAVENOUS at 00:20

## 2023-07-03 RX ADMIN — PREGABALIN 200 MG: 100 CAPSULE ORAL at 21:32

## 2023-07-03 RX ADMIN — MORPHINE SULFATE 2 MG: 2 INJECTION, SOLUTION INTRAMUSCULAR; INTRAVENOUS at 01:10

## 2023-07-03 RX ADMIN — METHOCARBAMOL 1500 MG: 500 TABLET ORAL at 15:37

## 2023-07-03 ASSESSMENT — PAIN SCALES - GENERAL
PAINLEVEL_OUTOF10: 7
PAINLEVEL_OUTOF10: 0
PAINLEVEL_OUTOF10: 7
PAINLEVEL_OUTOF10: 7

## 2023-07-03 NOTE — FLOWSHEET NOTE
RN to xray room 1 to push gastrografin per order. See MAR. Pts name, , and allergies verified with patient. Placement of left nare NG tube verified with air bolus and xray images. Not able to aspirate and gastric contents at this time for PH test.     250 ml or gastrografin mixed with 250 ml of water slowly administered through gastric tube. Pt tolerated well. Pt denies nausea. Pt will remain in xray with tech for additional images.

## 2023-07-03 NOTE — PLAN OF CARE
Nutrition Problem #1: Unintended weight loss  Intervention: Food and/or Nutrient Delivery: Continue Current Diet, Start Oral Nutrition Supplement
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
## 2023-07-03 NOTE — CARE COORDINATION
Consult received and discussed with patient's nurse that Dr Anne-Marie Staples is not available at this time she is off, and will be through this week to let Dr Shakira Mckenzie that the PM&R physician covering is not a pain specialist, and is only available to evaluate patients rehab needs.   Electronically signed by Ramy Lowry RN on 7/3/23 at 2:43 PM EDT

## 2023-07-03 NOTE — CARE COORDINATION
Case Management Assessment  Initial Evaluation    Date/Time of Evaluation: 7/3/2023 2:01 PM  Assessment Completed by: KWAME Graham, RC    If patient is discharged prior to next notation, then this note serves as note for discharge by case management. Patient Name: Kacy Cross                   YOB: 1985  Diagnosis: Small bowel obstruction ([address]) [K56.609]  SBO (small bowel obstruction) ([address]) [C67.348]                   Date / Time: 7/2/2023  4:51 PM    Patient Admission Status: Inpatient   Readmission Risk (Low < 19, Mod (19-27), High > 27): Readmission Risk Score: 21.7    Current PCP: Bin Warren MD  PCP verified by CM? Yes    Chart Reviewed: Yes      History Provided by: Patient  Patient Orientation: Alert and Oriented    Patient Cognition: Alert    Hospitalization in the last 30 days (Readmission):  No    If yes, Readmission Assessment in CM Navigator will be completed. Advance Directives:      Code Status: Full Code   Patient's Primary Decision Maker is:      Primary Decision Maker: BrightMegan - Spouse - [phone]    Discharge Planning:    Patient lives with: Spouse/Significant Other, Children Type of Home: House  Primary Care Giver: Spouse  Patient Support Systems include: Spouse/Significant Other, Family Members   Current Financial resources:    Current community resources:    Current services prior to admission: [facility] (nurse oncewk and PT 2/wk)            Current DME:              Type of Home Care services:  PT, Nursing Services    ADLS  Prior functional level: Assistance with the following:, Bathing, Dressing, Toileting  Current functional level: Assistance with the following:, Bathing, Dressing, Toileting    PT AM-PAC:   /24  OT AM-PAC:   /24    Family can provide assistance at DC: Yes  Would you like Case Management to discuss the discharge plan with any other family members/significant others, and if so, who?  No  Plans to Return to Present Housing:

## 2023-07-04 VITALS
TEMPERATURE: 97.9 F | HEART RATE: 81 BPM | RESPIRATION RATE: 18 BRPM | BODY MASS INDEX: 23.22 KG/M2 | HEIGHT: 72 IN | OXYGEN SATURATION: 97 % | WEIGHT: 171.4 LBS | SYSTOLIC BLOOD PRESSURE: 139 MMHG | DIASTOLIC BLOOD PRESSURE: 85 MMHG

## 2023-07-04 LAB
ANION GAP SERPL CALCULATED.3IONS-SCNC: 10 MEQ/L (ref 9–15)
BUN SERPL-MCNC: 8 MG/DL (ref 6–20)
CALCIUM SERPL-MCNC: 8.6 MG/DL (ref 8.5–9.9)
CHLORIDE SERPL-SCNC: 104 MEQ/L (ref 95–107)
CO2 SERPL-SCNC: 26 MEQ/L (ref 20–31)
CREAT SERPL-MCNC: 0.47 MG/DL (ref 0.7–1.2)
ERYTHROCYTE [DISTWIDTH] IN BLOOD BY AUTOMATED COUNT: 14.5 % (ref 11.5–14.5)
GLUCOSE SERPL-MCNC: 93 MG/DL (ref 70–99)
HCT VFR BLD AUTO: 33.1 % (ref 42–52)
HGB BLD-MCNC: 10.9 G/DL (ref 14–18)
MAGNESIUM SERPL-MCNC: 1.8 MG/DL (ref 1.7–2.4)
MCH RBC QN AUTO: 28.9 PG (ref 27–31.3)
MCHC RBC AUTO-ENTMCNC: 32.9 % (ref 33–37)
MCV RBC AUTO: 87.8 FL (ref 79–92.2)
PHOSPHATE SERPL-MCNC: 4.1 MG/DL (ref 2.3–4.8)
PLATELET # BLD AUTO: 372 K/UL (ref 130–400)
POTASSIUM SERPL-SCNC: 3.3 MEQ/L (ref 3.4–4.9)
RBC # BLD AUTO: 3.77 M/UL (ref 4.7–6.1)
SODIUM SERPL-SCNC: 140 MEQ/L (ref 135–144)
WBC # BLD AUTO: 3.5 K/UL (ref 4.8–10.8)

## 2023-07-04 PROCEDURE — 83735 ASSAY OF MAGNESIUM: CPT

## 2023-07-04 PROCEDURE — 6360000002 HC RX W HCPCS: Performed by: SURGERY

## 2023-07-04 PROCEDURE — 85027 COMPLETE CBC AUTOMATED: CPT

## 2023-07-04 PROCEDURE — 97162 PT EVAL MOD COMPLEX 30 MIN: CPT

## 2023-07-04 PROCEDURE — 84100 ASSAY OF PHOSPHORUS: CPT

## 2023-07-04 PROCEDURE — 6370000000 HC RX 637 (ALT 250 FOR IP): Performed by: SURGERY

## 2023-07-04 PROCEDURE — 80048 BASIC METABOLIC PNL TOTAL CA: CPT

## 2023-07-04 PROCEDURE — 36415 COLL VENOUS BLD VENIPUNCTURE: CPT

## 2023-07-04 RX ORDER — TRAMADOL HYDROCHLORIDE 50 MG/1
50 TABLET ORAL EVERY 6 HOURS PRN
Qty: 28 TABLET | Refills: 0 | Status: SHIPPED | OUTPATIENT
Start: 2023-07-04 | End: 2023-07-11

## 2023-07-04 RX ORDER — POTASSIUM CHLORIDE 750 MG/1
40 TABLET, FILM COATED, EXTENDED RELEASE ORAL ONCE
Status: COMPLETED | OUTPATIENT
Start: 2023-07-04 | End: 2023-07-04

## 2023-07-04 RX ORDER — POLYETHYLENE GLYCOL 3350 17 G/17G
17 POWDER, FOR SOLUTION ORAL DAILY
Qty: 507 G | Refills: 2 | Status: SHIPPED | OUTPATIENT
Start: 2023-07-04 | End: 2023-08-03

## 2023-07-04 RX ORDER — MAGNESIUM SULFATE IN WATER 40 MG/ML
2000 INJECTION, SOLUTION INTRAVENOUS ONCE
Status: COMPLETED | OUTPATIENT
Start: 2023-07-04 | End: 2023-07-04

## 2023-07-04 RX ORDER — DOCUSATE SODIUM 100 MG/1
100 CAPSULE, LIQUID FILLED ORAL DAILY
Qty: 30 CAPSULE | Refills: 1 | Status: SHIPPED | OUTPATIENT
Start: 2023-07-04 | End: 2023-08-03

## 2023-07-04 RX ADMIN — METHOCARBAMOL 1500 MG: 500 TABLET ORAL at 08:43

## 2023-07-04 RX ADMIN — DULOXETINE HYDROCHLORIDE 30 MG: 30 CAPSULE, DELAYED RELEASE ORAL at 08:43

## 2023-07-04 RX ADMIN — PREGABALIN 200 MG: 100 CAPSULE ORAL at 08:43

## 2023-07-04 RX ADMIN — MAGNESIUM SULFATE HEPTAHYDRATE 2000 MG: 40 INJECTION, SOLUTION INTRAVENOUS at 08:42

## 2023-07-04 RX ADMIN — OXYCODONE AND ACETAMINOPHEN 1 TABLET: 325; 5 TABLET ORAL at 08:43

## 2023-07-04 RX ADMIN — POTASSIUM CHLORIDE 40 MEQ: 750 TABLET, FILM COATED, EXTENDED RELEASE ORAL at 08:45

## 2023-07-04 RX ADMIN — ASPIRIN 325 MG: 325 TABLET ORAL at 08:43

## 2023-07-04 ASSESSMENT — PAIN SCALES - GENERAL
PAINLEVEL_OUTOF10: 3
PAINLEVEL_OUTOF10: 7

## 2023-07-04 ASSESSMENT — PAIN DESCRIPTION - LOCATION: LOCATION: PELVIS

## 2023-07-04 ASSESSMENT — PAIN DESCRIPTION - ORIENTATION: ORIENTATION: LEFT;RIGHT

## 2023-07-04 ASSESSMENT — PAIN DESCRIPTION - DESCRIPTORS: DESCRIPTORS: ACHING

## 2023-07-04 NOTE — PLAN OF CARE
Therapy evaluation completed. Please see daily notes and/or progress notes for details related to planned treatment interventions, goals and functional performance.      Electronically signed by oCnnor Balderas PT on 7/4/2023 at 11:06 AM

## 2023-07-04 NOTE — DISCHARGE INSTRUCTIONS
The following is a brief overview of your hospitalization. Some of the information contained on this summary may be confidential.  This information should be kept in your records and should be shared with your regular doctor. Admission Date: 7/2/2023  Discharge Date: 7/4/2023  Disposition:  Home    PRINCIPAL DIAGNOSIS (reason after study for this admission): Partial small bowel obstruction    Physicians:               Attending: Yael Davenport MD    Refer to After Visit Summary for Home Medications List  -After Visit Summary and medications reviewed with patient and family  -Please discard previous medication list and update your records with the new medication list, medication providers, or pharmacies    Procedures performed while hospitalized:   IV access  Small bowel follow through    Operations performed while hospitalized:   N/A    Summary of my hospitalization: You came to the hospital with acute abdominal pain in the setting of vomiting. Your CT scan was concerning for a bowel obstruction. A nasogastric tube was placed and you underwent a small bowel follow through test the following day that revealed there was no obstruction. You had multiple bowel movements. As your symptoms improved, the nasogastric tube was removed and your diet advanced as tolerated. On 7/4, as you were doing well, were deemed appropriate for discharge home. Pain Control:  Continue taking your home pain regimen, try to cut back on the oxycodone and instead transition to tramadol with decreased doses. Follow up with pain management. You should not exceed more than 4000mg of tylenol/acetaminophen from all sources during a 24 hours period. Do not drive while taking prescription pain medications. Activity after Discharge: OK to resume normal activities. Ambulation is encouraged. Diet: Pre-hospital diet. Bowel regimen: take colace (stool softener) once to twice daily.  Take 1/2 serving to 1 serving of miralax daily for a
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
## 2023-07-04 NOTE — PLAN OF CARE
Problem: Discharge Planning  Goal: Discharge to home or other facility with appropriate resources  7/4/2023 1145 by Marlene Nava RN  Outcome: Completed  7/4/2023 1145 by Marlene Nava RN  Outcome: Adequate for Discharge  7/4/2023 0341 by Sanju Hobson RN  Outcome: Progressing     Problem: Pain  Goal: Verbalizes/displays adequate comfort level or baseline comfort level  7/4/2023 1145 by Marlene Nava RN  Outcome: Completed  7/4/2023 1145 by Marlene Nava RN  Outcome: Adequate for Discharge  7/4/2023 0341 by Sanju Hobson RN  Outcome: Progressing     Problem: Safety - Adult  Goal: Free from fall injury  7/4/2023 1145 by Marlene Nava RN  Outcome: Completed  7/4/2023 1145 by Marlene Nava RN  Outcome: Adequate for Discharge  7/4/2023 0341 by Sanju Hobson RN  Outcome: Progressing     Problem: ABCDS Injury Assessment  Goal: Absence of physical injury  7/4/2023 1145 by Marlene Nava RN  Outcome: Completed  7/4/2023 1145 by Marlene Nava RN  Outcome: Adequate for Discharge  7/4/2023 0341 by Sanju Hobson RN  Outcome: Progressing     Problem: Nutrition Deficit:  Goal: Optimize nutritional status  7/4/2023 1145 by Marlene Nava RN  Outcome: Completed  7/4/2023 1145 by Marlene Nava RN  Outcome: Adequate for Discharge  7/4/2023 0341 by Sanju Hobson RN  Outcome: Progressing

## 2023-07-04 NOTE — PLAN OF CARE
Problem: Discharge Planning  Goal: Discharge to home or other facility with appropriate resources  7/4/2023 1145 by Leopoldo Second, RN  Outcome: Adequate for Discharge  7/4/2023 0341 by Dakotah Ahmadi RN  Outcome: Progressing     Problem: Pain  Goal: Verbalizes/displays adequate comfort level or baseline comfort level  7/4/2023 1145 by Leopoldo Second, RN  Outcome: Adequate for Discharge  7/4/2023 0341 by Dakotah Ahmadi RN  Outcome: Progressing     Problem: Safety - Adult  Goal: Free from fall injury  7/4/2023 1145 by Leopoldo Second, RN  Outcome: Adequate for Discharge  7/4/2023 0341 by Dakotah Ahmadi RN  Outcome: Progressing     Problem: ABCDS Injury Assessment  Goal: Absence of physical injury  7/4/2023 1145 by Leopoldo Second, RN  Outcome: Adequate for Discharge  7/4/2023 0341 by Dakotah Ahmadi RN  Outcome: Progressing     Problem: Nutrition Deficit:  Goal: Optimize nutritional status  7/4/2023 1145 by Leopoldo Second, RN  Outcome: Adequate for Discharge  7/4/2023 0341 by Dakotah Ahmadi RN  Outcome: Progressing

## 2023-07-06 ENCOUNTER — PATIENT OUTREACH (OUTPATIENT)
Dept: CARE COORDINATION | Facility: CLINIC | Age: 38
End: 2023-07-06
Payer: COMMERCIAL

## 2023-07-06 RX ORDER — DOCUSATE SODIUM 100 MG/1
100 CAPSULE, LIQUID FILLED ORAL 2 TIMES DAILY
COMMUNITY
End: 2023-07-18 | Stop reason: SDUPTHER

## 2023-07-06 NOTE — PROGRESS NOTES
[facility]  Discharge Diagnosis: SBO, partial  Admission Date:7/2/2023  Discharge Date: 7/4/2023    PCP Appointment Date:7/7/2023   Specialist Appointment Date: N/A  Hospital Encounter and Summary: Linked   See discharge assessment below for further details  Engagement  Call Start Time: 1124 (7/6/2023 11:25 AM)    Medications  Medications reviewed with patient/caregiver?: Not applicable (No change of medication other than OTC Colace 1-2 qd) (7/6/2023 11:25 AM)  Is the patient having any side effects they believe may be caused by any medication additions or changes?: No (7/6/2023 11:25 AM)  Does the patient have all medications ordered at discharge?: Not applicable (7/6/2023 11:25 AM)  Care Management Interventions: No intervention needed (7/6/2023 11:25 AM)  Is the patient taking all medications as directed (includes completed medication regime)?: Not applicable (7/6/2023 11:25 AM)    Appointments  Does the patient have a primary care provider?: Yes (7/6/2023 11:25 AM)  Care Management Interventions: Verified appointment date/time/provider (7/6/2023 11:25 AM)  Has the patient kept scheduled appointments due by today?: Not applicable (7/6/2023 11:25 AM)    Patient Teaching  Does the patient have access to their discharge instructions?: Yes (7/6/2023 11:25 AM)  What is the patient's perception of their health status since discharge?: Improving (States having normal bowel movement and no discomfort) (7/6/2023 11:25 AM)  Is the patient/caregiver able to teach back the hierarchy of who to call/visit for symptoms/problems? PCP, Specialist, Home Health nurse, Urgent Care, ED, 911: Yes (7/6/2023 11:25 AM)        
Statement Selected

## 2023-07-07 ENCOUNTER — OFFICE VISIT (OUTPATIENT)
Dept: PRIMARY CARE | Facility: CLINIC | Age: 38
End: 2023-07-07
Payer: COMMERCIAL

## 2023-07-07 VITALS
RESPIRATION RATE: 14 BRPM | DIASTOLIC BLOOD PRESSURE: 82 MMHG | OXYGEN SATURATION: 95 % | SYSTOLIC BLOOD PRESSURE: 121 MMHG | HEART RATE: 89 BPM | TEMPERATURE: 97.7 F

## 2023-07-07 DIAGNOSIS — D62 ACUTE BLOOD LOSS ANEMIA: ICD-10-CM

## 2023-07-07 DIAGNOSIS — Z79.899 MEDICATION MANAGEMENT: ICD-10-CM

## 2023-07-07 DIAGNOSIS — T07.XXXA MULTIPLE TRAUMA: Primary | ICD-10-CM

## 2023-07-07 PROBLEM — S32.411A: Status: ACTIVE | Noted: 2023-05-05

## 2023-07-07 PROBLEM — S32.9XXA: Status: ACTIVE | Noted: 2023-05-05

## 2023-07-07 PROBLEM — S22.040A COMPRESSION FRACTURE OF T4 VERTEBRA (MULTI): Status: RESOLVED | Noted: 2023-05-07 | Resolved: 2023-07-07

## 2023-07-07 PROBLEM — S37.22XA: Status: ACTIVE | Noted: 2023-05-10

## 2023-07-07 PROBLEM — K56.609 SBO (SMALL BOWEL OBSTRUCTION) (MULTI): Status: ACTIVE | Noted: 2023-07-02

## 2023-07-07 PROBLEM — S72.91XC: Status: ACTIVE | Noted: 2023-05-05

## 2023-07-07 PROBLEM — S22.040A COMPRESSION FRACTURE OF T4 VERTEBRA (MULTI): Status: ACTIVE | Noted: 2023-05-07

## 2023-07-07 PROBLEM — S32.592A CLOSED FRACTURE OF MULTIPLE PUBIC RAMI, LEFT, INITIAL ENCOUNTER (MULTI): Status: ACTIVE | Noted: 2023-05-05

## 2023-07-07 PROBLEM — N50.1 PELVIC HEMATOMA, MALE: Status: ACTIVE | Noted: 2023-05-05

## 2023-07-07 LAB
BACTERIA BLD CULT ORG #2: NORMAL
BACTERIA BLD CULT: NORMAL

## 2023-07-07 PROCEDURE — 1036F TOBACCO NON-USER: CPT | Performed by: FAMILY MEDICINE

## 2023-07-07 PROCEDURE — 99204 OFFICE O/P NEW MOD 45 MIN: CPT | Performed by: FAMILY MEDICINE

## 2023-07-07 RX ORDER — EPINEPHRINE 0.3 MG/.3ML
INJECTION SUBCUTANEOUS
COMMUNITY
Start: 2022-12-10

## 2023-07-07 RX ORDER — OXYCODONE HYDROCHLORIDE 5 MG/1
TABLET ORAL
COMMUNITY
Start: 2023-06-23 | End: 2023-07-07 | Stop reason: ALTCHOICE

## 2023-07-07 RX ORDER — NALOXONE HYDROCHLORIDE 4 MG/.1ML
4 SPRAY NASAL AS NEEDED
Qty: 2 EACH | Refills: 0 | Status: SHIPPED | OUTPATIENT
Start: 2023-07-07

## 2023-07-07 RX ORDER — TRAMADOL HYDROCHLORIDE 50 MG/1
50 TABLET ORAL
COMMUNITY
Start: 2023-07-04 | End: 2023-07-07 | Stop reason: SDUPTHER

## 2023-07-07 RX ORDER — OXYCODONE AND ACETAMINOPHEN 5; 325 MG/1; MG/1
1 TABLET ORAL EVERY 6 HOURS PRN
COMMUNITY
Start: 2023-06-29 | End: 2023-07-07 | Stop reason: SDUPTHER

## 2023-07-07 RX ORDER — POLYETHYLENE GLYCOL 3350 17 G/17G
17 POWDER, FOR SOLUTION ORAL DAILY
Qty: 510 G | Refills: 0 | COMMUNITY
Start: 2023-07-04 | End: 2023-08-03

## 2023-07-07 RX ORDER — ASPIRIN 325 MG
325 TABLET ORAL
COMMUNITY
Start: 2023-06-26 | End: 2023-07-20 | Stop reason: SDUPTHER

## 2023-07-07 RX ORDER — PREGABALIN 200 MG/1
200 CAPSULE ORAL
COMMUNITY
Start: 2023-06-16 | End: 2023-07-07 | Stop reason: SDUPTHER

## 2023-07-07 RX ORDER — OXYCODONE HYDROCHLORIDE 5 MG/1
5 TABLET ORAL EVERY 6 HOURS PRN
Qty: 15 TABLET | Refills: 0 | Status: SHIPPED | OUTPATIENT
Start: 2023-07-07 | End: 2023-07-14

## 2023-07-07 RX ORDER — ACETAMINOPHEN 500 MG
2 TABLET ORAL EVERY 6 HOURS PRN
COMMUNITY
Start: 2023-06-21 | End: 2023-07-18 | Stop reason: SDUPTHER

## 2023-07-07 RX ORDER — METHOCARBAMOL 750 MG/1
2 TABLET, FILM COATED ORAL 3 TIMES DAILY
Qty: 180 TABLET | Refills: 0 | COMMUNITY
Start: 2023-06-21 | End: 2023-07-21

## 2023-07-07 RX ORDER — ONDANSETRON 4 MG/1
1 TABLET, ORALLY DISINTEGRATING ORAL EVERY 8 HOURS PRN
COMMUNITY
Start: 2023-06-16

## 2023-07-07 RX ORDER — PREGABALIN 200 MG/1
200 CAPSULE ORAL 3 TIMES DAILY
Qty: 90 CAPSULE | Refills: 0 | Status: SHIPPED | OUTPATIENT
Start: 2023-07-07 | End: 2023-07-12 | Stop reason: CLARIF

## 2023-07-07 RX ORDER — TRAMADOL HYDROCHLORIDE 50 MG/1
50 TABLET ORAL EVERY 6 HOURS PRN
Qty: 120 TABLET | Refills: 0 | Status: SHIPPED | OUTPATIENT
Start: 2023-07-07 | End: 2023-08-08 | Stop reason: SDUPTHER

## 2023-07-07 RX ORDER — DULOXETIN HYDROCHLORIDE 30 MG/1
1 CAPSULE, DELAYED RELEASE ORAL 2 TIMES DAILY
Qty: 60 CAPSULE | Refills: 3 | COMMUNITY
Start: 2023-06-23 | End: 2023-10-24 | Stop reason: SDUPTHER

## 2023-07-07 RX ORDER — ENOXAPARIN SODIUM 100 MG/ML
INJECTION SUBCUTANEOUS
COMMUNITY
Start: 2023-06-16 | End: 2023-07-07 | Stop reason: ALTCHOICE
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
## 2023-07-07 NOTE — ASSESSMENT & PLAN NOTE
Most recent count on 7/4/23 was 10.9 -- prior to that it was 11.2.  Will continue to monitor through blood work in 1 month.
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
## 2023-07-12 ENCOUNTER — TELEPHONE (OUTPATIENT)
Dept: PRIMARY CARE | Facility: CLINIC | Age: 38
End: 2023-07-12
Payer: COMMERCIAL

## 2023-07-12 DIAGNOSIS — S32.10XD CLOSED COMPRESSION FRACTURE OF SACRUM WITH ROUTINE HEALING: ICD-10-CM

## 2023-07-12 DIAGNOSIS — M79.2 NEUROPATHIC PAIN, LEG, LEFT: Primary | ICD-10-CM

## 2023-07-12 DIAGNOSIS — T07.XXXA MULTIPLE TRAUMA: ICD-10-CM

## 2023-07-12 DIAGNOSIS — S22.060D COMPRESSION FRACTURE OF T7 VERTEBRA WITH ROUTINE HEALING, SUBSEQUENT ENCOUNTER: ICD-10-CM

## 2023-07-12 PROBLEM — S32.000A COMPRESSION FRACTURE OF LUMBAR VERTEBRA (MULTI): Status: ACTIVE | Noted: 2023-07-12

## 2023-07-12 PROBLEM — S22.060A COMPRESSION FRACTURE OF T7 VERTEBRA (MULTI): Status: ACTIVE | Noted: 2023-07-12

## 2023-07-12 RX ORDER — GABAPENTIN 100 MG/1
100 CAPSULE ORAL 3 TIMES DAILY
Qty: 90 CAPSULE | Refills: 2 | Status: SHIPPED | OUTPATIENT
Start: 2023-07-12 | End: 2023-07-13

## 2023-07-12 NOTE — TELEPHONE ENCOUNTER
PA being requested for Pregnable 200 mg  PA sent through cover my meds.   Awaiting determination.

## 2023-07-13 DIAGNOSIS — S32.592A CLOSED FRACTURE OF MULTIPLE PUBIC RAMI, LEFT, INITIAL ENCOUNTER (HCC): Primary | ICD-10-CM

## 2023-07-13 DIAGNOSIS — S72.91XF: ICD-10-CM

## 2023-07-13 RX ORDER — PREGABALIN 200 MG/1
200 CAPSULE ORAL 3 TIMES DAILY
Qty: 90 CAPSULE | Refills: 0 | Status: SHIPPED | OUTPATIENT
Start: 2023-07-13 | End: 2023-08-21 | Stop reason: SDUPTHER

## 2023-07-13 NOTE — TELEPHONE ENCOUNTER
Need to continue PA process for Lyrica  I will reorder with updated diagnosis to see if can get approved

## 2023-07-13 NOTE — TELEPHONE ENCOUNTER
Pt stated he has stated he has tried gabapentin before and it did not help     He stated he has Am better [facility]

## 2023-07-14 DIAGNOSIS — S32.411D CLOSED DISPLACED FRACTURE OF ANTERIOR WALL OF RIGHT ACETABULUM WITH ROUTINE HEALING, SUBSEQUENT ENCOUNTER: Primary | ICD-10-CM

## 2023-07-17 ENCOUNTER — HOSPITAL ENCOUNTER (OUTPATIENT)
Dept: GENERAL RADIOLOGY | Age: 38
Discharge: HOME | End: 2023-07-19
Payer: COMMERCIAL

## 2023-07-17 ENCOUNTER — OFFICE VISIT (OUTPATIENT)
Dept: ORTHOPEDIC SURGERY | Age: 38
End: 2023-07-17
Payer: COMMERCIAL

## 2023-07-17 VITALS — WEIGHT: 175 LBS | HEIGHT: 72 IN | BODY MASS INDEX: 23.7 KG/M2

## 2023-07-17 DIAGNOSIS — S72.91XF: ICD-10-CM

## 2023-07-17 DIAGNOSIS — S32.411A CLOSED DISPLACED FRACTURE OF ANTERIOR WALL OF RIGHT ACETABULUM, INITIAL ENCOUNTER (HCC): ICD-10-CM

## 2023-07-17 DIAGNOSIS — S72.91XC: Primary | ICD-10-CM

## 2023-07-17 DIAGNOSIS — S32.592A CLOSED FRACTURE OF MULTIPLE PUBIC RAMI, LEFT, INITIAL ENCOUNTER (HCC): ICD-10-CM

## 2023-07-17 DIAGNOSIS — T14.90XA TRAUMA: ICD-10-CM

## 2023-07-17 PROCEDURE — 99213 OFFICE O/P EST LOW 20 MIN: CPT | Performed by: PHYSICIAN ASSISTANT

## 2023-07-17 PROCEDURE — 72190 X-RAY EXAM OF PELVIS: CPT

## 2023-07-17 PROCEDURE — 73552 X-RAY EXAM OF FEMUR 2/>: CPT

## 2023-07-17 PROCEDURE — 99024 POSTOP FOLLOW-UP VISIT: CPT | Performed by: PHYSICIAN ASSISTANT

## 2023-07-17 NOTE — PROGRESS NOTES
Chief Complaint   Patient presents with    Post-Op Check     Two week po check pelvic  ex fix removal and ORIF 6/30/23. Remains in therapy, standing, pivoting, and using wheeled walker. In w/c today. OP:SURGEON: Dr. Jacqueline Terrell MD  DATE OF PROCEDURE: 5/6/23  PROCEDURE: 1. Irrigation debridement right grade 3A open femoral shaft fracture, contaminated, with excisional debridement of skin, subcutaneous tissue, fascia, muscle, and bone     2. Open treatment right femoral shaft fracture with application of external fixator     3. Insertion of antibiotic delivery device right open femoral shaft fracture     4. Closed treatment with manipulation under anesthesia and application of anterior external fixator pelvic ring injury, unstable      OP:SURGEON: Dr. Shay Peters DO  DATE OF PROCEDURE: 5/8/23  PROCEDURE:   Excisional irrigation debridement open fracture site right femur including skin, subcutaneous tissue, myofascial tissue and bone  Right femoral shaft fracture retrograde IMR stabilization  Removal of right femur spanning external fixation  Left sided sacral fracture open reduction with internal fixation       OP:SURGEON: Dr. Shay Peters,   DATE OF PROCEDURE: 6/14/23  PROCEDURE: stress exam of the pelvis under anesthesia  Revision and adjustment of anterior pelvic external fixation    OP:SURGEON: Dr. Shay Peters DO  DATE OF PROCEDURE: 6/30/23  PROCEDURE: PELVIS EXTERNAL FIXATOR REMOVAL, Manual stress examination under fluoro    Subjective:  Leticia Bustillo is f/u from the above surgeries. He is TTWB L LE and WBAT R LE. Pain is minimal and tolerable. No red flag sxs or saddle paresthesias. Taking ASA for DVT ppx. Most recent surgery on 6/30 for pelvis ex fix removal found to have stable pelvis under stress. Denies calf pain, CP, SOB, fever, chills, malaise. Review of Systems -  all pertinent positives and negatives in HPI.         Objective:    General: Alert and oriented X 3, normocephalic

## 2023-07-17 NOTE — TELEPHONE ENCOUNTER
PA not needed, patient picked up medication.    He will need PA for the month of August though due to insurance

## 2023-07-18 RX ORDER — DOCUSATE SODIUM 100 MG/1
100 CAPSULE, LIQUID FILLED ORAL 2 TIMES DAILY
Qty: 60 CAPSULE | Refills: 0 | Status: SHIPPED | OUTPATIENT
Start: 2023-07-18 | End: 2023-08-21 | Stop reason: SDUPTHER

## 2023-07-18 RX ORDER — ACETAMINOPHEN 500 MG
1000 TABLET ORAL EVERY 6 HOURS PRN
Qty: 120 TABLET | Refills: 0 | Status: SHIPPED | OUTPATIENT
Start: 2023-07-18

## 2023-07-20 DIAGNOSIS — Z79.899 MEDICATION MANAGEMENT: ICD-10-CM

## 2023-07-20 RX ORDER — ASPIRIN 325 MG
325 TABLET ORAL ONCE
Qty: 90 TABLET | Refills: 3 | Status: SHIPPED | OUTPATIENT
Start: 2023-07-21 | End: 2023-07-21

## 2023-07-20 NOTE — TELEPHONE ENCOUNTER
Spoke with patient, he is aware that colace was sent 7/18/23.    He misspoke when talking to Tami.  He needs aspirin sent, not advil.

## 2023-07-20 NOTE — TELEPHONE ENCOUNTER
"[facility] Daily Progress     Patient Name: Jennifer Alexander  Date: 10/17/2017  Visit #: 5  Referral Diagnosis: Lumbar spine DDD  Referring provider: Dr. Quentin Marshall  Visit Diagnosis:     ICD-10-CM    1. Chronic bilateral low back pain without sciatica M54.5     G89.29    2. Chronic right hip pain M25.551     G89.29          Assessment:   Patient continues to note improvement following the manual therapy. Patient noting a five day period of increased pain that she thinks was caused by the fatigue in therapy but most likely from class where she s uatted, rotated to the R and shifted weight. Patient is currently in the 3rd week of the MedX program and tolerating appropriately.    HEP/POC compliance is  good .  Patient is appropriate to continue with skilled physical therapy intervention, as indicated by initial plan of care.    Goal Status:  Pt. will be independent with home exercise program in : 4 weeks  Pt will: decrease MARION by 30% in 8 weeks  Pt will: increase lumbar extension strength by 30# in 10 weeks    Plan / Patient Education:     Continue with initial plan of care.  Progress with home program as tolerated.    PLAN for next visit: Bridge  Subjective:     Pain Ratin in the hip and calf  Patient states to have had a very tough week. She was unable to do her cardio and pilates classes. This pain started on Thursday during an exercise class with a yoga move that involved her leg.   She is very fearful of going to her class today but not willing to skip exercise class on the days she is in therapy. \"I would rather cancel PT\". She has had pain since  the class on Thursday. PT reviewed the position with the patient and reviewd possible modification.  She thinks the cause in fatigue, although we did not change anything.  Objective:   Patient is currently in the third week of the MedX machine.  PT educated the patient on the dosage of the IBP taking one dose of 200 mg just once is not much at all to " Pt called in for med refills. Pt states he left a message on the refill line and his medications did not get sent in. Pt needs a refill on the following :    Colace 100mg  Advil 325mg    Pt uses [facility] in [place]. Pt's next ov 8/8/23. Pt would like a cb at [phone].   "benefit her.    Exercises: see flow sheet  Exercise #1: kneeling hip flexor stretch x 30 seconds/leg  Comment #1: Seated piriformis stretch x 30 seconds/leg  Exercise #2: Nustep x 0 minutes, treadmill x 3.5 minutes  Comment #2: Rotary Torso 90\" 28# started to the L    Lumbar MedX Initial testing (9/21/17 4-week Re-test   AROM (full=  0-72  lumbar) 0-66    Max Extension Torque  100#    Average Extension Torque     Flex: ext ratio (ideal 1.4:1) 2.33:1        Treatment Today     TREATMENT MINUTES COMMENTS   Evaluation     Self-care/ Home management     Manual therapy 15 Patient positioned in supine- TPR to R lateral proximal quad and R lateral gastroc   Neuromuscular Re-education     Therapeutic Activity     Therapeutic Exercises 13 See flow sheet  Educated the patient at length on what may have caused her pain   Gait training     Modality__________________                Total 28    Blank areas are intentional and mean the treatment did not include these items.       Romelia Chau  10/17/2017    "

## 2023-07-26 DIAGNOSIS — S72.91XC: Primary | ICD-10-CM

## 2023-07-31 ENCOUNTER — HOSPITAL ENCOUNTER (OUTPATIENT)
Dept: GENERAL RADIOLOGY | Age: 38
Discharge: HOME | End: 2023-08-02
Payer: COMMERCIAL

## 2023-07-31 ENCOUNTER — OFFICE VISIT (OUTPATIENT)
Dept: ORTHOPEDIC SURGERY | Age: 38
End: 2023-07-31
Payer: COMMERCIAL

## 2023-07-31 VITALS — BODY MASS INDEX: 23.7 KG/M2 | HEIGHT: 72 IN | WEIGHT: 175 LBS

## 2023-07-31 DIAGNOSIS — S32.592A CLOSED FRACTURE OF MULTIPLE PUBIC RAMI, LEFT, INITIAL ENCOUNTER (HCC): ICD-10-CM

## 2023-07-31 DIAGNOSIS — S32.411D CLOSED DISPLACED FRACTURE OF ANTERIOR WALL OF RIGHT ACETABULUM WITH ROUTINE HEALING, SUBSEQUENT ENCOUNTER: ICD-10-CM

## 2023-07-31 DIAGNOSIS — S72.91XC: Primary | ICD-10-CM

## 2023-07-31 DIAGNOSIS — T14.90XA TRAUMA: ICD-10-CM

## 2023-07-31 DIAGNOSIS — S32.411A CLOSED DISPLACED FRACTURE OF ANTERIOR WALL OF RIGHT ACETABULUM, INITIAL ENCOUNTER (HCC): ICD-10-CM

## 2023-07-31 DIAGNOSIS — S72.91XC: ICD-10-CM

## 2023-07-31 PROCEDURE — 72190 X-RAY EXAM OF PELVIS: CPT

## 2023-07-31 PROCEDURE — 99024 POSTOP FOLLOW-UP VISIT: CPT | Performed by: ORTHOPAEDIC SURGERY

## 2023-07-31 PROCEDURE — 99213 OFFICE O/P EST LOW 20 MIN: CPT | Performed by: ORTHOPAEDIC SURGERY

## 2023-08-03 NOTE — PROGRESS NOTES
Chief Complaint   Patient presents with    Post-Op Check     Four week po pelvic ex fix removal and ORIF 6/30/23. Patient up with wheeled walker. Continues with PT ambulating at home with walker. Has done stairs with a crutch. Doing long distances in walking at therapy. OP:SURGEON: Dr. Eryn White MD  DATE OF PROCEDURE: 5/6/23  PROCEDURE: 1. Irrigation debridement right grade 3A open femoral shaft fracture, contaminated, with excisional debridement of skin, subcutaneous tissue, fascia, muscle, and bone     2. Open treatment right femoral shaft fracture with application of external fixator     3. Insertion of antibiotic delivery device right open femoral shaft fracture     4. Closed treatment with manipulation under anesthesia and application of anterior external fixator pelvic ring injury, unstable      OP:SURGEON: Dr. Samanta You DO  DATE OF PROCEDURE: 5/8/23  PROCEDURE:   Excisional irrigation debridement open fracture site right femur including skin, subcutaneous tissue, myofascial tissue and bone  Right femoral shaft fracture retrograde IMR stabilization  Removal of right femur spanning external fixation  Left sided sacral fracture open reduction with internal fixation       OP:SURGEON: Dr. Samanta You,   DATE OF PROCEDURE: 6/14/23  PROCEDURE: stress exam of the pelvis under anesthesia  Revision and adjustment of anterior pelvic external fixation    OP:SURGEON: Dr. Samanta You DO  DATE OF PROCEDURE: 6/30/23  PROCEDURE: PELVIS EXTERNAL FIXATOR REMOVAL, Manual stress examination under fluoro    Subjective:  Krzysztof Don is f/u from the above surgeries. He is PWB L LE and WBAT RLE. Pain is minimal and tolerable. Overall patient feels like he is improving. Accompanied by multiple family members today. Continues to work on strengthening and gait. Denies any significant pain but does have some soreness. Does still have some urinary incontinence. Denies any bowel issues.   Denies any

## 2023-08-08 ENCOUNTER — OFFICE VISIT (OUTPATIENT)
Dept: PRIMARY CARE | Facility: CLINIC | Age: 38
End: 2023-08-08
Payer: COMMERCIAL

## 2023-08-08 ENCOUNTER — LAB (OUTPATIENT)
Dept: LAB | Facility: LAB | Age: 38
End: 2023-08-08
Payer: COMMERCIAL

## 2023-08-08 VITALS
SYSTOLIC BLOOD PRESSURE: 95 MMHG | OXYGEN SATURATION: 96 % | WEIGHT: 180 LBS | HEART RATE: 70 BPM | TEMPERATURE: 98 F | RESPIRATION RATE: 14 BRPM | DIASTOLIC BLOOD PRESSURE: 58 MMHG

## 2023-08-08 DIAGNOSIS — S22.060D COMPRESSION FRACTURE OF T7 VERTEBRA WITH ROUTINE HEALING, SUBSEQUENT ENCOUNTER: ICD-10-CM

## 2023-08-08 DIAGNOSIS — S32.10XD CLOSED COMPRESSION FRACTURE OF SACRUM WITH ROUTINE HEALING: ICD-10-CM

## 2023-08-08 DIAGNOSIS — T07.XXXA MULTIPLE TRAUMA: ICD-10-CM

## 2023-08-08 DIAGNOSIS — M79.2 NEUROPATHIC PAIN, LEG, LEFT: ICD-10-CM

## 2023-08-08 DIAGNOSIS — Z79.899 MEDICATION MANAGEMENT: ICD-10-CM

## 2023-08-08 PROBLEM — K56.609 SBO (SMALL BOWEL OBSTRUCTION) (MULTI): Status: RESOLVED | Noted: 2023-07-02 | Resolved: 2023-08-08

## 2023-08-08 PROCEDURE — 99214 OFFICE O/P EST MOD 30 MIN: CPT | Performed by: FAMILY MEDICINE

## 2023-08-08 PROCEDURE — 1036F TOBACCO NON-USER: CPT | Performed by: FAMILY MEDICINE

## 2023-08-08 PROCEDURE — 80307 DRUG TEST PRSMV CHEM ANLYZR: CPT

## 2023-08-08 PROCEDURE — 80349 CANNABINOIDS NATURAL: CPT

## 2023-08-08 RX ORDER — TRAMADOL HYDROCHLORIDE 50 MG/1
50 TABLET ORAL EVERY 6 HOURS PRN
Qty: 60 TABLET | Refills: 0 | Status: SHIPPED | OUTPATIENT
Start: 2023-08-08 | End: 2023-09-12 | Stop reason: ALTCHOICE

## 2023-08-08 RX ORDER — METHOCARBAMOL 750 MG/1
2 TABLET, FILM COATED ORAL 3 TIMES DAILY
COMMUNITY
Start: 2023-06-23 | End: 2023-08-08 | Stop reason: ALTCHOICE

## 2023-08-08 RX ORDER — NALOXONE HYDROCHLORIDE 4 MG/.1ML
1 SPRAY NASAL
COMMUNITY
Start: 2023-07-07 | End: 2023-08-08 | Stop reason: SDUPTHER

## 2023-08-08 RX ORDER — ENOXAPARIN SODIUM 100 MG/ML
INJECTION SUBCUTANEOUS
COMMUNITY
Start: 2023-06-16 | End: 2023-08-08 | Stop reason: ALTCHOICE

## 2023-08-08 ASSESSMENT — PATIENT HEALTH QUESTIONNAIRE - PHQ9
2. FEELING DOWN, DEPRESSED OR HOPELESS: NOT AT ALL
SUM OF ALL RESPONSES TO PHQ9 QUESTIONS 1 AND 2: 0
1. LITTLE INTEREST OR PLEASURE IN DOING THINGS: NOT AT ALL

## 2023-08-08 NOTE — PROGRESS NOTES
Subjective     Patient ID: Ronnie Lainez is a 38 y.o. male who presents for follow up of chronic medical conditions.    Per patient he is getting better slowly but surely,   Feeling okay.   Started weaning off some medications: aspirin, Robaxin, oxycodone. Starting to reduce the tramadol.       HPI    Review of Systems    Objective   BP 95/58 (BP Location: Right arm, Patient Position: Sitting, BP Cuff Size: Adult)   Pulse 70   Temp 36.7 °C (98 °F) (Temporal)   Resp 14   Wt 81.6 kg (180 lb)   SpO2 96%   Physical Exam    Assessment/Plan   Problem List Items Addressed This Visit       Multiple trauma    Relevant Medications    traMADol (Ultram) 50 mg tablet    Compression fracture of T7 vertebra (CMS/HCC)    Closed compression fracture of sacrum with routine healing    Neuropathic pain, leg, left

## 2023-08-09 ENCOUNTER — HOSPITAL ENCOUNTER (OUTPATIENT)
Dept: GENERAL RADIOLOGY | Age: 38
Discharge: HOME | End: 2023-08-11
Payer: COMMERCIAL

## 2023-08-09 ENCOUNTER — HOSPITAL ENCOUNTER (OUTPATIENT)
Age: 38
Discharge: HOME | End: 2023-08-11
Payer: COMMERCIAL

## 2023-08-09 ENCOUNTER — OFFICE VISIT (OUTPATIENT)
Dept: NEUROSURGERY | Age: 38
End: 2023-08-09
Payer: COMMERCIAL

## 2023-08-09 DIAGNOSIS — S22.040D COMPRESSION FRACTURE OF T4 VERTEBRA WITH ROUTINE HEALING, SUBSEQUENT ENCOUNTER: Primary | ICD-10-CM

## 2023-08-09 DIAGNOSIS — S22.000D COMPRESSION FRACTURE OF THORACIC VERTEBRA WITH ROUTINE HEALING, UNSPECIFIED THORACIC VERTEBRAL LEVEL, SUBSEQUENT ENCOUNTER: ICD-10-CM

## 2023-08-09 LAB
AMPHETAMINE (PRESENCE) IN URINE BY SCREEN METHOD: ABNORMAL
BARBITURATES PRESENCE IN URINE BY SCREEN METHOD: ABNORMAL
BENZODIAZEPINE (PRESENCE) IN URINE BY SCREEN METHOD: ABNORMAL
CANNABINOIDS IN URINE BY SCREEN METHOD: ABNORMAL
COCAINE (PRESENCE) IN URINE BY SCREEN METHOD: ABNORMAL
DRUG SCREEN COMMENT URINE: ABNORMAL
FENTANYL URINE: ABNORMAL
METHADONE (PRESENCE) IN URINE BY SCREEN METHOD: ABNORMAL
OPIATES (PRESENCE) IN URINE BY SCREEN METHOD: ABNORMAL
OXYCODONE (PRESENCE) IN URINE BY SCREEN METHOD: ABNORMAL
PHENCYCLIDINE (PRESENCE) IN URINE BY SCREEN METHOD: ABNORMAL

## 2023-08-09 PROCEDURE — 72072 X-RAY EXAM THORAC SPINE 3VWS: CPT

## 2023-08-09 PROCEDURE — 99213 OFFICE O/P EST LOW 20 MIN: CPT | Performed by: PHYSICIAN ASSISTANT

## 2023-08-09 PROCEDURE — 99212 OFFICE O/P EST SF 10 MIN: CPT
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
## 2023-08-14 LAB — 11-NOR-9-CARBOXY-THC, URN, QUANT: >500 NG/ML

## 2023-08-21 ENCOUNTER — TELEPHONE (OUTPATIENT)
Dept: PRIMARY CARE | Facility: CLINIC | Age: 38
End: 2023-08-21
Payer: COMMERCIAL

## 2023-08-21 DIAGNOSIS — M79.2 NEUROPATHIC PAIN, LEG, LEFT: ICD-10-CM

## 2023-08-21 DIAGNOSIS — S32.10XD CLOSED COMPRESSION FRACTURE OF SACRUM WITH ROUTINE HEALING: ICD-10-CM

## 2023-08-21 DIAGNOSIS — S22.060D COMPRESSION FRACTURE OF T7 VERTEBRA WITH ROUTINE HEALING, SUBSEQUENT ENCOUNTER: ICD-10-CM

## 2023-08-21 DIAGNOSIS — S32.411D CLOSED DISPLACED FRACTURE OF ANTERIOR WALL OF RIGHT ACETABULUM WITH ROUTINE HEALING, SUBSEQUENT ENCOUNTER: ICD-10-CM

## 2023-08-21 RX ORDER — DOCUSATE SODIUM 100 MG/1
100 CAPSULE, LIQUID FILLED ORAL 2 TIMES DAILY
Qty: 60 CAPSULE | Refills: 3 | Status: CANCELLED | OUTPATIENT
Start: 2023-08-21

## 2023-08-21 RX ORDER — PREGABALIN 200 MG/1
200 CAPSULE ORAL 3 TIMES DAILY
Qty: 90 CAPSULE | Refills: 0 | OUTPATIENT
Start: 2023-08-21

## 2023-08-21 RX ORDER — DOCUSATE SODIUM 100 MG/1
100 CAPSULE, LIQUID FILLED ORAL 2 TIMES DAILY
Qty: 60 CAPSULE | Refills: 0 | OUTPATIENT
Start: 2023-08-21

## 2023-08-21 RX ORDER — PREGABALIN 200 MG/1
200 CAPSULE ORAL 3 TIMES DAILY
Qty: 90 CAPSULE | Refills: 0 | Status: CANCELLED | OUTPATIENT
Start: 2023-08-21

## 2023-08-21 NOTE — TELEPHONE ENCOUNTER
Pt calling said he left a message on rx line last week for refill Lyrica 200 mg and Colace 100 mg.    Pt is out and had to miss doses.

## 2023-08-22 RX ORDER — DOCUSATE SODIUM 100 MG/1
100 CAPSULE, LIQUID FILLED ORAL 2 TIMES DAILY
Qty: 60 CAPSULE | Refills: 0 | Status: SHIPPED | OUTPATIENT
Start: 2023-08-22 | End: 2023-09-12 | Stop reason: SDUPTHER

## 2023-08-22 RX ORDER — PREGABALIN 200 MG/1
200 CAPSULE ORAL 3 TIMES DAILY
Qty: 90 CAPSULE | Refills: 0 | Status: SHIPPED | OUTPATIENT
Start: 2023-08-22 | End: 2023-09-12 | Stop reason: SDUPTHER

## 2023-09-08 DIAGNOSIS — S32.592A CLOSED FRACTURE OF MULTIPLE PUBIC RAMI, LEFT, INITIAL ENCOUNTER (HCC): ICD-10-CM

## 2023-09-08 DIAGNOSIS — S72.91XC: Primary | ICD-10-CM

## 2023-09-12 ENCOUNTER — APPOINTMENT (OUTPATIENT)
Dept: PRIMARY CARE | Facility: CLINIC | Age: 38
End: 2023-09-12
Payer: COMMERCIAL

## 2023-09-12 ENCOUNTER — OFFICE VISIT (OUTPATIENT)
Dept: PRIMARY CARE | Facility: CLINIC | Age: 38
End: 2023-09-12
Payer: COMMERCIAL

## 2023-09-12 VITALS
DIASTOLIC BLOOD PRESSURE: 55 MMHG | OXYGEN SATURATION: 96 % | RESPIRATION RATE: 12 BRPM | HEART RATE: 76 BPM | TEMPERATURE: 97.7 F | SYSTOLIC BLOOD PRESSURE: 95 MMHG | WEIGHT: 176.6 LBS

## 2023-09-12 DIAGNOSIS — S32.411D CLOSED DISPLACED FRACTURE OF ANTERIOR WALL OF RIGHT ACETABULUM WITH ROUTINE HEALING, SUBSEQUENT ENCOUNTER: ICD-10-CM

## 2023-09-12 DIAGNOSIS — E87.6 HYPOKALEMIA: ICD-10-CM

## 2023-09-12 DIAGNOSIS — D62 ACUTE BLOOD LOSS ANEMIA: Primary | ICD-10-CM

## 2023-09-12 DIAGNOSIS — Z11.59 NEED FOR HEPATITIS C SCREENING TEST: ICD-10-CM

## 2023-09-12 DIAGNOSIS — S32.10XD CLOSED COMPRESSION FRACTURE OF SACRUM WITH ROUTINE HEALING: ICD-10-CM

## 2023-09-12 DIAGNOSIS — S22.060D COMPRESSION FRACTURE OF T7 VERTEBRA WITH ROUTINE HEALING, SUBSEQUENT ENCOUNTER: ICD-10-CM

## 2023-09-12 DIAGNOSIS — Z00.00 WELLNESS EXAMINATION: ICD-10-CM

## 2023-09-12 DIAGNOSIS — Z11.4 ENCOUNTER FOR SCREENING FOR HIV: ICD-10-CM

## 2023-09-12 DIAGNOSIS — T07.XXXA MULTIPLE TRAUMA: ICD-10-CM

## 2023-09-12 DIAGNOSIS — M79.2 NEUROPATHIC PAIN, LEG, LEFT: ICD-10-CM

## 2023-09-12 PROBLEM — N50.1 PELVIC HEMATOMA, MALE: Status: RESOLVED | Noted: 2023-05-05 | Resolved: 2023-09-12

## 2023-09-12 PROCEDURE — 99214 OFFICE O/P EST MOD 30 MIN: CPT | Performed by: FAMILY MEDICINE

## 2023-09-12 PROCEDURE — 1036F TOBACCO NON-USER: CPT | Performed by: FAMILY MEDICINE

## 2023-09-12 RX ORDER — IBUPROFEN 600 MG/1
600 TABLET ORAL 4 TIMES DAILY PRN
Qty: 120 TABLET | Refills: 2 | Status: SHIPPED | OUTPATIENT
Start: 2023-09-12 | End: 2024-03-06 | Stop reason: SDUPTHER

## 2023-09-12 RX ORDER — DOCUSATE SODIUM 100 MG/1
100 CAPSULE, LIQUID FILLED ORAL 2 TIMES DAILY
Qty: 180 CAPSULE | Refills: 1 | Status: SHIPPED | OUTPATIENT
Start: 2023-09-12 | End: 2023-10-24 | Stop reason: SDUPTHER

## 2023-09-12 RX ORDER — PREGABALIN 200 MG/1
200 CAPSULE ORAL 3 TIMES DAILY
Qty: 90 CAPSULE | Refills: 0 | Status: SHIPPED | OUTPATIENT
Start: 2023-09-12 | End: 2023-10-24 | Stop reason: SDUPTHER

## 2023-09-12 RX ORDER — TADALAFIL 5 MG/1
5 TABLET ORAL
COMMUNITY
Start: 2023-08-31 | End: 2024-01-03 | Stop reason: SDUPTHER
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
## 2023-09-12 NOTE — ASSESSMENT & PLAN NOTE
EMG shows sciatic nerve damage  Continue lyrica at current dose  Consider wean at 3 month follow up
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
## 2023-09-12 NOTE — ASSESSMENT & PLAN NOTE
Most recent count on 7/4/23 was 10.9 -- prior to that it was 11.2.  Will continue to monitor through blood work -  order placed for CBC

## 2023-09-14 ENCOUNTER — HOSPITAL ENCOUNTER (OUTPATIENT)
Dept: GENERAL RADIOLOGY | Age: 38
Discharge: HOME | End: 2023-09-16
Payer: COMMERCIAL

## 2023-09-14 ENCOUNTER — OFFICE VISIT (OUTPATIENT)
Dept: ORTHOPEDIC SURGERY | Age: 38
End: 2023-09-14
Payer: COMMERCIAL

## 2023-09-14 VITALS — WEIGHT: 175 LBS | HEIGHT: 72 IN | BODY MASS INDEX: 23.7 KG/M2

## 2023-09-14 DIAGNOSIS — S32.592A CLOSED FRACTURE OF MULTIPLE PUBIC RAMI, LEFT, INITIAL ENCOUNTER (HCC): ICD-10-CM

## 2023-09-14 DIAGNOSIS — S72.351N: Primary | ICD-10-CM

## 2023-09-14 DIAGNOSIS — S32.411D CLOSED DISPLACED FRACTURE OF ANTERIOR WALL OF RIGHT ACETABULUM WITH ROUTINE HEALING, SUBSEQUENT ENCOUNTER: ICD-10-CM

## 2023-09-14 DIAGNOSIS — S72.91XC: ICD-10-CM

## 2023-09-14 DIAGNOSIS — S32.9XXD CLOSED DISPLACED FRACTURE OF PELVIS WITH ROUTINE HEALING, UNSPECIFIED PART OF PELVIS, SUBSEQUENT ENCOUNTER: ICD-10-CM

## 2023-09-14 PROCEDURE — 99213 OFFICE O/P EST LOW 20 MIN: CPT | Performed by: PHYSICIAN ASSISTANT

## 2023-09-14 PROCEDURE — 73552 X-RAY EXAM OF FEMUR 2/>: CPT

## 2023-09-14 PROCEDURE — 72190 X-RAY EXAM OF PELVIS: CPT

## 2023-09-14 RX ORDER — PHENOL 1.4 %
1 AEROSOL, SPRAY (ML) MUCOUS MEMBRANE DAILY
Qty: 30 TABLET | Refills: 3 | Status: SHIPPED | OUTPATIENT
Start: 2023-09-14

## 2023-09-14 RX ORDER — ERGOCALCIFEROL 1.25 MG/1
50000 CAPSULE ORAL WEEKLY
Qty: 12 CAPSULE | Refills: 1 | Status: SHIPPED | OUTPATIENT
Start: 2023-09-14

## 2023-09-14 NOTE — PATIENT INSTRUCTIONS
You were ordered CT of the right femur  by your Orthopedic Provider. If you do not hear from that department please contact: [phone]    Please make sure your follow up appointment in office is scheduled shortly after the above testing is complete. If your testing is completed significantly sooner than planned follow up contact office for appointment adjustment. You were ordered lab work. Please go to a [facility] lab to have these completed. Follow up after CT and lab work has been completed.

## 2023-09-14 NOTE — PROGRESS NOTES
Chief Complaint   Patient presents with    Post-Op Check     Patient is in office for 10 week post OP pelvic ex fix removal and ORIF 6/30/23. Patient reports that he is doing well, in PT 3x per week and progressing well. Patient is ambulating with a cane in office today. OP: SURGEON: Dr. Kirk Khanna MD  DATE OF PROCEDURE: 5/6/23  PROCEDURE:   1. Irrigation debridement right grade 3A open femoral shaft fracture, contaminated, with excisional debridement of skin, subcutaneous tissue, fascia, muscle, and bone     2. Open treatment right femoral shaft fracture with application of external fixator     3. Insertion of antibiotic delivery device right open femoral shaft fracture     4. Closed treatment with manipulation under anesthesia and application of anterior external fixator pelvic ring injury, unstable        OP:SURGEON: Dr. Phil Milan DO  DATE OF PROCEDURE: 5/8/23  PROCEDURE:   Excisional irrigation debridement open fracture site right femur including skin, subcutaneous tissue, myofascial tissue and bone  Right femoral shaft fracture retrograde IMR stabilization  Removal of right femur spanning external fixation  Left sided sacral fracture open reduction with internal fixation        OP:SURGEON: Dr. Phil Milan,   DATE OF PROCEDURE: 6/14/23  PROCEDURE: stress exam of the pelvis under anesthesia  Revision and adjustment of anterior pelvic external fixation     OP:SURGEON: Dr. Phil Milan,   DATE OF PROCEDURE: 6/30/23  PROCEDURE: PELVIS EXTERNAL FIXATOR REMOVAL, Manual stress examination under fluoro    Subjective:  Neil Garrison is following up from the above surgery. He is WBAT on right lower extremity. He ambulates with assistive device, cane. Pain to extremity is moderate. He states this is mainly to her femur. Denies any perineal or saddle paresthesias. No red flag sxs or saddle paresthesias. Denies calf pain, CP, SOB, fever, chills, malaise.  Denies calf pain, chest pain, or Jordyn Islas. Plan of care discussed in detail, all questions sought and answered to patients satisfaction at this time. Discussed concern for nonunion of the right femur. Recommended a CT scan of the right femur along with a nonunion work up with labs. This was ordered today. Patient will follow up once this has been completed for possible discussion of revision ORIF. Patient started on vitamin D and calcium  Follow up once CT scan and labs have been completed. All questions answered. Electronically signed by Olivier Rosas PA-C on 9/14/2023 at 11:59 AM  Note: This report was completed using [employer] voiced recognition software. Every effort has been made to ensure accuracy; however, inadvertent computerized transcription errors may be present.

## 2023-09-15 DIAGNOSIS — S72.351N: ICD-10-CM

## 2023-09-15 LAB
ANION GAP SERPL CALCULATED.3IONS-SCNC: 13 MEQ/L (ref 9–15)
BASOPHILS # BLD: 0 K/UL (ref 0–0.2)
BASOPHILS NFR BLD: 0.5 %
BUN SERPL-MCNC: 11 MG/DL (ref 6–20)
CALCIUM SERPL-MCNC: 8.7 MG/DL (ref 8.5–9.9)
CHLORIDE SERPL-SCNC: 105 MEQ/L (ref 95–107)
CO2 SERPL-SCNC: 22 MEQ/L (ref 20–31)
CREAT SERPL-MCNC: 0.56 MG/DL (ref 0.7–1.2)
CRP SERPL HS-MCNC: 11.1 MG/L (ref 0–5)
EOSINOPHIL # BLD: 0.2 K/UL (ref 0–0.7)
EOSINOPHIL NFR BLD: 3.9 %
ERYTHROCYTE [DISTWIDTH] IN BLOOD BY AUTOMATED COUNT: 15.1 % (ref 11.5–14.5)
ERYTHROCYTE [SEDIMENTATION RATE] IN BLOOD BY WESTERGREN METHOD: 4 MM (ref 0–10)
GLUCOSE SERPL-MCNC: 100 MG/DL (ref 70–99)
HCT VFR BLD AUTO: 40.2 % (ref 42–52)
HGB BLD-MCNC: 13.3 G/DL (ref 14–18)
LYMPHOCYTES # BLD: 1.6 K/UL (ref 1–4.8)
LYMPHOCYTES NFR BLD: 31.8 %
MCH RBC QN AUTO: 29.2 PG (ref 27–31.3)
MCHC RBC AUTO-ENTMCNC: 33.2 % (ref 33–37)
MCV RBC AUTO: 87.9 FL (ref 79–92.2)
MONOCYTES # BLD: 0.3 K/UL (ref 0.2–0.8)
MONOCYTES NFR BLD: 6.5 %
NEUTROPHILS # BLD: 2.8 K/UL (ref 1.4–6.5)
NEUTS SEG NFR BLD: 57.3 %
PLATELET # BLD AUTO: 256 K/UL (ref 130–400)
POTASSIUM SERPL-SCNC: 4.3 MEQ/L (ref 3.4–4.9)
RBC # BLD AUTO: 4.57 M/UL (ref 4.7–6.1)
SODIUM SERPL-SCNC: 140 MEQ/L (ref 135–144)
TSH SERPL-MCNC: 1.04 UIU/ML (ref 0.44–3.86)
WBC # BLD AUTO: 4.9 K/UL (ref 4.8–10.8)

## 2023-09-16 LAB — VITAMIN D 25-HYDROXY: 15.2 NG/ML

## 2023-09-21 ENCOUNTER — HOSPITAL ENCOUNTER (OUTPATIENT)
Dept: CT IMAGING | Age: 38
Discharge: HOME | End: 2023-09-23
Payer: COMMERCIAL

## 2023-09-21 DIAGNOSIS — S72.351N: ICD-10-CM

## 2023-09-21 PROCEDURE — 73700 CT LOWER EXTREMITY W/O DYE: CPT

## 2023-10-04 ENCOUNTER — PATIENT OUTREACH (OUTPATIENT)
Dept: PRIMARY CARE | Facility: CLINIC | Age: 38
End: 2023-10-04
Payer: COMMERCIAL
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
## 2023-10-05 ENCOUNTER — OFFICE VISIT (OUTPATIENT)
Dept: ORTHOPEDIC SURGERY | Age: 38
End: 2023-10-05
Payer: COMMERCIAL

## 2023-10-05 DIAGNOSIS — S72.351N: Primary | ICD-10-CM

## 2023-10-05 DIAGNOSIS — S32.9XXD CLOSED DISPLACED FRACTURE OF PELVIS WITH ROUTINE HEALING, UNSPECIFIED PART OF PELVIS, SUBSEQUENT ENCOUNTER: ICD-10-CM

## 2023-10-05 PROCEDURE — 99212 OFFICE O/P EST SF 10 MIN: CPT | Performed by: ORTHOPAEDIC SURGERY

## 2023-10-07 NOTE — PROGRESS NOTES
debridement right grade 3A open femoral shaft fracture, contaminated, with excisional debridement of skin, subcutaneous tissue, fascia, muscle, and bone  2. Open treatment right femoral shaft fracture with application of external fixator  3. Insertion of antibiotic delivery device right open femoral shaft fracture  4. Closed treatment with manipulation under anesthesia and application of anterior external fixator pelvic ring injury, unstable     OP:SURGEON: Dr. Campos Jeffery, DO  DATE OF PROCEDURE: 5/8/23  PROCEDURE:   Excisional irrigation debridement open fracture site right femur including skin, subcutaneous tissue, myofascial tissue and bone  Right femoral shaft fracture retrograde IMR stabilization  Removal of right femur spanning external fixation  Left sided sacral fracture open reduction with internal fixation     OP:SURGEON: Dr. Campos Jeffery, DO  DATE OF PROCEDURE: 6/14/23  PROCEDURE: stress exam of the pelvis under anesthesia  Revision and adjustment of anterior pelvic external fixation     OP:SURGEON: Dr. Campos Jeffery, DO  DATE OF PROCEDURE: 6/30/23  PROCEDURE: PELVIS EXTERNAL FIXATOR REMOVAL, Manual stress examination under fluoro    CC: Left thigh pain    S: Zhane Fine is a 45 y.o. who is here today for follow up from the above surgeries. He is weightbearing as tolerated bilateral lower extremities. Uses a cane for assistance. States the pelvis continues to feel better daily and weekly. Main complaint is the right femur. Still has pain to the fracture site as well as the proximal and distal extent of the femur where the screws were placed. Feels this has been relatively unchanged. Had recent work-up to evaluate for nonunion is here today to discuss. Denies any perineal or saddle paresthesias. No red flag sxs or saddle paresthesias. Denies calf pain, CP, SOB, fever, chills, malaise. Denies calf pain, chest pain, or shortness of breath.   No other interval questions or concerns

## 2023-10-24 DIAGNOSIS — M79.2 NEUROPATHIC PAIN, LEG, LEFT: ICD-10-CM

## 2023-10-24 DIAGNOSIS — S32.411D CLOSED DISPLACED FRACTURE OF ANTERIOR WALL OF RIGHT ACETABULUM WITH ROUTINE HEALING, SUBSEQUENT ENCOUNTER: ICD-10-CM

## 2023-10-24 DIAGNOSIS — S22.060D COMPRESSION FRACTURE OF T7 VERTEBRA WITH ROUTINE HEALING, SUBSEQUENT ENCOUNTER: ICD-10-CM

## 2023-10-24 DIAGNOSIS — S32.10XD CLOSED COMPRESSION FRACTURE OF SACRUM WITH ROUTINE HEALING: ICD-10-CM

## 2023-10-24 RX ORDER — DULOXETIN HYDROCHLORIDE 30 MG/1
30 CAPSULE, DELAYED RELEASE ORAL 2 TIMES DAILY
Qty: 60 CAPSULE | Refills: 1 | Status: SHIPPED | OUTPATIENT
Start: 2023-10-24 | End: 2023-12-14 | Stop reason: SDUPTHER

## 2023-10-24 RX ORDER — DOCUSATE SODIUM 100 MG/1
100 CAPSULE, LIQUID FILLED ORAL 2 TIMES DAILY
Qty: 60 CAPSULE | Refills: 1 | Status: SHIPPED | OUTPATIENT
Start: 2023-10-24 | End: 2023-12-06 | Stop reason: SDUPTHER

## 2023-10-24 RX ORDER — PREGABALIN 200 MG/1
200 CAPSULE ORAL 3 TIMES DAILY
Qty: 90 CAPSULE | Refills: 0 | Status: SHIPPED | OUTPATIENT
Start: 2023-10-24 | End: 2023-12-06 | Stop reason: SDUPTHER

## 2023-12-06 DIAGNOSIS — M79.2 NEUROPATHIC PAIN, LEG, LEFT: ICD-10-CM

## 2023-12-06 DIAGNOSIS — S32.10XD CLOSED COMPRESSION FRACTURE OF SACRUM WITH ROUTINE HEALING: ICD-10-CM

## 2023-12-06 DIAGNOSIS — S32.411D CLOSED DISPLACED FRACTURE OF ANTERIOR WALL OF RIGHT ACETABULUM WITH ROUTINE HEALING, SUBSEQUENT ENCOUNTER: ICD-10-CM

## 2023-12-06 DIAGNOSIS — S22.060D COMPRESSION FRACTURE OF T7 VERTEBRA WITH ROUTINE HEALING, SUBSEQUENT ENCOUNTER: ICD-10-CM

## 2023-12-06 RX ORDER — DOCUSATE SODIUM 100 MG/1
100 CAPSULE, LIQUID FILLED ORAL 2 TIMES DAILY
Qty: 180 CAPSULE | Refills: 1 | Status: SHIPPED | OUTPATIENT
Start: 2023-12-06 | End: 2024-01-03 | Stop reason: SDUPTHER

## 2023-12-06 RX ORDER — PREGABALIN 200 MG/1
200 CAPSULE ORAL 3 TIMES DAILY
Qty: 90 CAPSULE | Refills: 0 | Status: SHIPPED | OUTPATIENT
Start: 2023-12-06 | End: 2024-01-03 | Stop reason: SDUPTHER

## 2023-12-11 ENCOUNTER — LAB (OUTPATIENT)
Dept: LAB | Facility: LAB | Age: 38
End: 2023-12-11
Payer: COMMERCIAL

## 2023-12-11 DIAGNOSIS — Z00.00 WELLNESS EXAMINATION: ICD-10-CM

## 2023-12-11 DIAGNOSIS — Z11.4 ENCOUNTER FOR SCREENING FOR HIV: ICD-10-CM

## 2023-12-11 DIAGNOSIS — Z11.59 NEED FOR HEPATITIS C SCREENING TEST: ICD-10-CM

## 2023-12-11 DIAGNOSIS — E87.6 HYPOKALEMIA: ICD-10-CM

## 2023-12-11 DIAGNOSIS — D62 ACUTE BLOOD LOSS ANEMIA: ICD-10-CM

## 2023-12-11 LAB
ALBUMIN SERPL BCP-MCNC: 4.3 G/DL (ref 3.4–5)
ALP SERPL-CCNC: 152 U/L (ref 33–120)
ALT SERPL W P-5'-P-CCNC: 11 U/L (ref 10–52)
ANION GAP SERPL CALC-SCNC: 11 MMOL/L (ref 10–20)
AST SERPL W P-5'-P-CCNC: 14 U/L (ref 9–39)
BASOPHILS # BLD AUTO: 0.02 X10*3/UL (ref 0–0.1)
BASOPHILS NFR BLD AUTO: 0.4 %
BILIRUB SERPL-MCNC: 0.4 MG/DL (ref 0–1.2)
BUN SERPL-MCNC: 10 MG/DL (ref 6–23)
CALCIUM SERPL-MCNC: 9.7 MG/DL (ref 8.6–10.3)
CHLORIDE SERPL-SCNC: 105 MMOL/L (ref 98–107)
CHOLEST SERPL-MCNC: 179 MG/DL (ref 0–199)
CHOLESTEROL/HDL RATIO: 3.7
CO2 SERPL-SCNC: 29 MMOL/L (ref 21–32)
CREAT SERPL-MCNC: 0.71 MG/DL (ref 0.5–1.3)
EOSINOPHIL # BLD AUTO: 0.09 X10*3/UL (ref 0–0.7)
EOSINOPHIL NFR BLD AUTO: 1.7 %
ERYTHROCYTE [DISTWIDTH] IN BLOOD BY AUTOMATED COUNT: 13.4 % (ref 11.5–14.5)
GFR SERPL CREATININE-BSD FRML MDRD: >90 ML/MIN/1.73M*2
GLUCOSE SERPL-MCNC: 94 MG/DL (ref 74–99)
HCT VFR BLD AUTO: 41.1 % (ref 41–52)
HCV AB SER QL: NONREACTIVE
HDLC SERPL-MCNC: 48.1 MG/DL
HGB BLD-MCNC: 13.2 G/DL (ref 13.5–17.5)
HIV 1+2 AB+HIV1 P24 AG SERPL QL IA: NONREACTIVE
IMM GRANULOCYTES # BLD AUTO: 0.01 X10*3/UL (ref 0–0.7)
IMM GRANULOCYTES NFR BLD AUTO: 0.2 % (ref 0–0.9)
LDLC SERPL CALC-MCNC: 95 MG/DL
LYMPHOCYTES # BLD AUTO: 1.25 X10*3/UL (ref 1.2–4.8)
LYMPHOCYTES NFR BLD AUTO: 23.3 %
MCH RBC QN AUTO: 30.2 PG (ref 26–34)
MCHC RBC AUTO-ENTMCNC: 32.1 G/DL (ref 32–36)
MCV RBC AUTO: 94 FL (ref 80–100)
MONOCYTES # BLD AUTO: 0.28 X10*3/UL (ref 0.1–1)
MONOCYTES NFR BLD AUTO: 5.2 %
NEUTROPHILS # BLD AUTO: 3.72 X10*3/UL (ref 1.2–7.7)
NEUTROPHILS NFR BLD AUTO: 69.2 %
NON HDL CHOLESTEROL: 131 MG/DL (ref 0–149)
NRBC BLD-RTO: 0 /100 WBCS (ref 0–0)
PLATELET # BLD AUTO: 289 X10*3/UL (ref 150–450)
POTASSIUM SERPL-SCNC: 4.4 MMOL/L (ref 3.5–5.3)
PROT SERPL-MCNC: 6.9 G/DL (ref 6.4–8.2)
RBC # BLD AUTO: 4.37 X10*6/UL (ref 4.5–5.9)
SODIUM SERPL-SCNC: 141 MMOL/L (ref 136–145)
TRIGL SERPL-MCNC: 181 MG/DL (ref 0–149)
VLDL: 36 MG/DL (ref 0–40)
WBC # BLD AUTO: 5.4 X10*3/UL (ref 4.4–11.3)

## 2023-12-11 PROCEDURE — 85025 COMPLETE CBC W/AUTO DIFF WBC: CPT

## 2023-12-11 PROCEDURE — 87389 HIV-1 AG W/HIV-1&-2 AB AG IA: CPT

## 2023-12-11 PROCEDURE — 36415 COLL VENOUS BLD VENIPUNCTURE: CPT

## 2023-12-11 PROCEDURE — 80053 COMPREHEN METABOLIC PANEL: CPT

## 2023-12-11 PROCEDURE — 86803 HEPATITIS C AB TEST: CPT

## 2023-12-11 PROCEDURE — 80061 LIPID PANEL: CPT

## 2023-12-14 ENCOUNTER — OFFICE VISIT (OUTPATIENT)
Dept: PRIMARY CARE | Facility: CLINIC | Age: 38
End: 2023-12-14
Payer: COMMERCIAL

## 2023-12-14 VITALS
HEART RATE: 99 BPM | TEMPERATURE: 97.9 F | DIASTOLIC BLOOD PRESSURE: 75 MMHG | WEIGHT: 172.1 LBS | SYSTOLIC BLOOD PRESSURE: 112 MMHG | RESPIRATION RATE: 14 BRPM | OXYGEN SATURATION: 98 %

## 2023-12-14 DIAGNOSIS — D62 ACUTE BLOOD LOSS ANEMIA: ICD-10-CM

## 2023-12-14 DIAGNOSIS — T07.XXXA MULTIPLE TRAUMA: ICD-10-CM

## 2023-12-14 DIAGNOSIS — M89.8X5 PAIN IN RIGHT FEMUR: Primary | ICD-10-CM

## 2023-12-14 DIAGNOSIS — G89.18 ACUTE POSTOPERATIVE PAIN OF EXTREMITY: ICD-10-CM

## 2023-12-14 DIAGNOSIS — M79.2 NEUROPATHIC PAIN, LEG, LEFT: ICD-10-CM

## 2023-12-14 PROBLEM — E87.6 HYPOKALEMIA: Status: RESOLVED | Noted: 2023-09-12 | Resolved: 2023-12-14

## 2023-12-14 PROBLEM — S37.22XA: Status: RESOLVED | Noted: 2023-05-10 | Resolved: 2023-12-14

## 2023-12-14 PROCEDURE — 99214 OFFICE O/P EST MOD 30 MIN: CPT | Performed by: FAMILY MEDICINE

## 2023-12-14 PROCEDURE — 1036F TOBACCO NON-USER: CPT | Performed by: FAMILY MEDICINE

## 2023-12-14 RX ORDER — FAMOTIDINE 20 MG/1
20 TABLET, FILM COATED ORAL EVERY 12 HOURS PRN
COMMUNITY
Start: 2023-11-18 | End: 2023-12-14 | Stop reason: WASHOUT

## 2023-12-14 RX ORDER — DULOXETIN HYDROCHLORIDE 30 MG/1
30 CAPSULE, DELAYED RELEASE ORAL 2 TIMES DAILY
Qty: 60 CAPSULE | Refills: 2 | Status: SHIPPED | OUTPATIENT
Start: 2023-12-14 | End: 2024-03-19 | Stop reason: SDUPTHER

## 2023-12-14 RX ORDER — ERGOCALCIFEROL 1.25 MG/1
50000 CAPSULE ORAL
COMMUNITY
Start: 2023-11-18 | End: 2023-12-14 | Stop reason: SDUPTHER

## 2023-12-14 RX ORDER — OXYCODONE HYDROCHLORIDE 5 MG/1
5-10 TABLET ORAL EVERY 4 HOURS PRN
Qty: 30 TABLET | Refills: 0 | Status: SHIPPED | OUTPATIENT
Start: 2023-12-14 | End: 2024-01-03 | Stop reason: ALTCHOICE

## 2023-12-14 RX ORDER — ERGOCALCIFEROL 1.25 MG/1
1 CAPSULE ORAL
COMMUNITY
Start: 2023-09-14 | End: 2024-03-19 | Stop reason: SDUPTHER

## 2023-12-14 RX ORDER — POLYETHYLENE GLYCOL 3350 17 G/17G
17 POWDER, FOR SOLUTION ORAL
COMMUNITY
Start: 2023-11-19

## 2023-12-14 RX ORDER — ASPIRIN 81 MG/1
81 TABLET ORAL 2 TIMES DAILY
COMMUNITY
Start: 2023-11-18

## 2023-12-14 RX ORDER — CEFADROXIL 500 MG/1
CAPSULE ORAL
COMMUNITY
Start: 2023-11-18 | End: 2023-12-14 | Stop reason: WASHOUT

## 2023-12-14 RX ORDER — OXYCODONE HYDROCHLORIDE 5 MG/1
5-10 TABLET ORAL EVERY 4 HOURS PRN
COMMUNITY
Start: 2023-11-28 | End: 2023-12-14 | Stop reason: SDUPTHER

## 2023-12-14 RX ORDER — ASPIRIN 325 MG
50000 TABLET, DELAYED RELEASE (ENTERIC COATED) ORAL WEEKLY
COMMUNITY
Start: 2023-11-18

## 2023-12-14 RX ORDER — CALCIUM CARBONATE 600 MG
1 TABLET ORAL DAILY
COMMUNITY
Start: 2023-09-14

## 2023-12-14 RX ORDER — CYCLOBENZAPRINE HCL 5 MG
5 TABLET ORAL 3 TIMES DAILY
COMMUNITY
Start: 2023-11-18 | End: 2024-03-19 | Stop reason: ALTCHOICE

## 2023-12-14 RX ORDER — OXYCODONE HYDROCHLORIDE 15 MG/1
TABLET ORAL
COMMUNITY
Start: 2023-11-18 | End: 2023-12-14 | Stop reason: WASHOUT

## 2023-12-14 RX ORDER — MAGNESIUM 200 MG
1000 TABLET ORAL
COMMUNITY
Start: 2023-11-18

## 2023-12-14 RX ORDER — OXYCODONE HYDROCHLORIDE 10 MG/1
TABLET ORAL
COMMUNITY
Start: 2023-11-22 | End: 2023-12-14 | Stop reason: DRUGHIGH

## 2023-12-14 ASSESSMENT — ENCOUNTER SYMPTOMS
CONSTIPATION: 1
DIFFICULTY URINATING: 0
FREQUENCY: 0
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
## 2023-12-14 NOTE — ASSESSMENT & PLAN NOTE
Lab Results   Component Value Date    WBC 5.4 12/11/2023    HGB 13.2 (L) 12/11/2023    HCT 41.1 12/11/2023    MCV 94 12/11/2023     12/11/2023     Lab Results   Component Value Date    CHOL 179 12/11/2023     Lab Results   Component Value Date    HDL 48.1 12/11/2023     Lab Results   Component Value Date    LDLCALC 95 12/11/2023     Lab Results   Component Value Date    TRIG 181 (H) 12/11/2023     Lab Results   Component Value Date    GLUCOSE 94 12/11/2023    CALCIUM 9.7 12/11/2023     12/11/2023    K 4.4 12/11/2023    CO2 29 12/11/2023     12/11/2023    BUN 10 12/11/2023    CREATININE 0.71 12/11/2023       Stable.

## 2023-12-14 NOTE — ASSESSMENT & PLAN NOTE
EMG showed sciatic nerve damage.  May be able to wean off Lyrica after receiving steroid injection.  For now, continue Lyrica 200 mg.  Follow-up in 3 months.
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
## 2024-01-02 ENCOUNTER — TELEPHONE (OUTPATIENT)
Dept: PRIMARY CARE | Facility: CLINIC | Age: 39
End: 2024-01-02
Payer: COMMERCIAL

## 2024-01-02 NOTE — TELEPHONE ENCOUNTER
----- Message from Clarissa Tovar CMA sent at 1/2/2024  1:26 PM EST -----  Regarding: FW: Prescription request   Contact: [phone]    ----- Message -----  From: Ronnie Lainez  Sent: 1/2/2024  12:50 PM EST  To: [facility] Clinical Support Staff  Subject: Prescription request                             Lizette David,    You told me to message you when I am ready for a tramadal script... I was able to ween off the oxy last week and have been managing my pain with my old tramadal that I had left over. I will be out of that tramadal in a couple of days. I will send you a message when I have weened off the tramadal as you requested.    I also need a tadalafil 5 MG script... My current one had 10 refills left but somehow got accedently canceled by the doctor that prescribed it. I could contact them but I figured it would be easier to have   it managed by you going forward.    I am also need refills on my pregabalin 200 MG. I take it 3x day for a qty of 90.    And docusate sodium 100mg. I take it 2x day for qty of 60.    Also just to keep you updated... I had my steroid injection for my sciatica nerve on 12/28 and starting on 1/5 I am going to start skipping some doses on my pregabalin to try and see if I can ween off or at least get to a lower dose. I'll see how that goes and keep you updated as well.    Current pharmacy is the [facility] in [place] on [address].     Thanks,  Arnoldo Lainez

## 2024-01-02 NOTE — TELEPHONE ENCOUNTER
Informed pt of what Dr. Plascencia said. Pt is comfortable with waiting for Dr. Ernandez input for this further. Pt states he just had and apt with Dr. David and a lot of this was discussed.

## 2024-01-03 DIAGNOSIS — S32.10XD CLOSED COMPRESSION FRACTURE OF SACRUM WITH ROUTINE HEALING: ICD-10-CM

## 2024-01-03 DIAGNOSIS — M79.2 NEUROPATHIC PAIN, LEG, LEFT: ICD-10-CM

## 2024-01-03 DIAGNOSIS — S32.411D CLOSED DISPLACED FRACTURE OF ANTERIOR WALL OF RIGHT ACETABULUM WITH ROUTINE HEALING, SUBSEQUENT ENCOUNTER: ICD-10-CM

## 2024-01-03 DIAGNOSIS — N52.1 ERECTILE DYSFUNCTION DUE TO DISEASES CLASSIFIED ELSEWHERE: Primary | ICD-10-CM

## 2024-01-03 DIAGNOSIS — T07.XXXA MULTIPLE TRAUMA: ICD-10-CM

## 2024-01-03 DIAGNOSIS — S22.060D COMPRESSION FRACTURE OF T7 VERTEBRA WITH ROUTINE HEALING, SUBSEQUENT ENCOUNTER: ICD-10-CM

## 2024-01-03 RX ORDER — DOCUSATE SODIUM 100 MG/1
100 CAPSULE, LIQUID FILLED ORAL 2 TIMES DAILY
Qty: 60 CAPSULE | Refills: 5 | Status: SHIPPED | OUTPATIENT
Start: 2024-01-03

## 2024-01-03 RX ORDER — TADALAFIL 5 MG/1
5 TABLET ORAL
Qty: 30 TABLET | Refills: 11 | Status: SHIPPED | OUTPATIENT
Start: 2024-01-03

## 2024-01-03 RX ORDER — PREGABALIN 200 MG/1
200 CAPSULE ORAL 3 TIMES DAILY
Qty: 90 CAPSULE | Refills: 0 | Status: SHIPPED | OUTPATIENT
Start: 2024-01-03 | End: 2024-02-02 | Stop reason: SDUPTHER

## 2024-01-04 RX ORDER — TRAMADOL HYDROCHLORIDE 50 MG/1
50 TABLET ORAL EVERY 6 HOURS PRN
Qty: 60 TABLET | Refills: 0 | Status: SHIPPED | OUTPATIENT
Start: 2024-01-04 | End: 2024-02-02 | Stop reason: ALTCHOICE

## 2024-02-02 ENCOUNTER — TELEPHONE (OUTPATIENT)
Dept: PRIMARY CARE | Facility: CLINIC | Age: 39
End: 2024-02-02
Payer: COMMERCIAL

## 2024-02-02 DIAGNOSIS — S32.10XD CLOSED COMPRESSION FRACTURE OF SACRUM WITH ROUTINE HEALING: ICD-10-CM

## 2024-02-02 DIAGNOSIS — M79.2 NEUROPATHIC PAIN, LEG, LEFT: ICD-10-CM

## 2024-02-02 DIAGNOSIS — T07.XXXA MULTIPLE TRAUMA: ICD-10-CM

## 2024-02-02 DIAGNOSIS — D62 ACUTE BLOOD LOSS ANEMIA: ICD-10-CM

## 2024-02-02 DIAGNOSIS — S22.060D COMPRESSION FRACTURE OF T7 VERTEBRA WITH ROUTINE HEALING, SUBSEQUENT ENCOUNTER: ICD-10-CM

## 2024-02-02 DIAGNOSIS — Z79.899 MEDICATION MANAGEMENT: Primary | ICD-10-CM

## 2024-02-02 RX ORDER — PREGABALIN 100 MG/1
CAPSULE ORAL
Qty: 180 CAPSULE | Refills: 0 | Status: SHIPPED | OUTPATIENT
Start: 2024-02-02 | End: 2024-02-07 | Stop reason: SDUPTHER

## 2024-02-02 RX ORDER — PREGABALIN 100 MG/1
200 CAPSULE ORAL 3 TIMES DAILY
Qty: 180 CAPSULE | Refills: 0 | Status: SHIPPED | OUTPATIENT
Start: 2024-02-02 | End: 2024-02-02 | Stop reason: SDUPTHER

## 2024-02-02 NOTE — TELEPHONE ENCOUNTER
Pharmacy called and needs more specific instructions on the lyrica it just need resent with more specific instructions   [facility] in [place]

## 2024-02-05 NOTE — TELEPHONE ENCOUNTER
Pharmacy sent fax, asked for more clarification, as a 30 days supply the way I wrote it (tapering )  is 105 pills for a 30 days supply .  Not 180    I will leave this for PCP .     Abdelrahman, pls address

## 2024-02-07 RX ORDER — PREGABALIN 100 MG/1
CAPSULE ORAL
Qty: 100 CAPSULE | Refills: 0 | Status: SHIPPED | OUTPATIENT
Start: 2024-02-07

## 2024-02-08 ENCOUNTER — LAB (OUTPATIENT)
Dept: LAB | Facility: LAB | Age: 39
End: 2024-02-08
Payer: COMMERCIAL

## 2024-02-08 DIAGNOSIS — S22.060D COMPRESSION FRACTURE OF T7 VERTEBRA WITH ROUTINE HEALING, SUBSEQUENT ENCOUNTER: ICD-10-CM

## 2024-02-08 DIAGNOSIS — T07.XXXA MULTIPLE TRAUMA: ICD-10-CM

## 2024-02-08 DIAGNOSIS — D62 ACUTE BLOOD LOSS ANEMIA: ICD-10-CM

## 2024-02-08 DIAGNOSIS — Z79.899 MEDICATION MANAGEMENT: ICD-10-CM

## 2024-02-08 DIAGNOSIS — M79.2 NEUROPATHIC PAIN, LEG, LEFT: ICD-10-CM

## 2024-02-08 DIAGNOSIS — S32.10XD CLOSED COMPRESSION FRACTURE OF SACRUM WITH ROUTINE HEALING: ICD-10-CM

## 2024-02-08 LAB
AMPHETAMINES UR QL SCN: NORMAL
BARBITURATES UR QL SCN: NORMAL
BENZODIAZ UR QL SCN: NORMAL
BZE UR QL SCN: NORMAL
CANNABINOIDS UR QL SCN: NORMAL
FENTANYL+NORFENTANYL UR QL SCN: NORMAL
OPIATES UR QL SCN: NORMAL
OXYCODONE+OXYMORPHONE UR QL SCN: NORMAL
PCP UR QL SCN: NORMAL
TESTOST SERPL-MCNC: 416 NG/DL (ref 240–1000)

## 2024-02-08 PROCEDURE — 36415 COLL VENOUS BLD VENIPUNCTURE: CPT

## 2024-02-08 PROCEDURE — 80307 DRUG TEST PRSMV CHEM ANLYZR: CPT

## 2024-02-08 PROCEDURE — 84403 ASSAY OF TOTAL TESTOSTERONE: CPT

## 2024-03-06 DIAGNOSIS — T07.XXXA MULTIPLE TRAUMA: ICD-10-CM

## 2024-03-06 DIAGNOSIS — S22.060D COMPRESSION FRACTURE OF T7 VERTEBRA WITH ROUTINE HEALING, SUBSEQUENT ENCOUNTER: ICD-10-CM

## 2024-03-06 DIAGNOSIS — M79.2 NEUROPATHIC PAIN, LEG, LEFT: ICD-10-CM

## 2024-03-06 RX ORDER — IBUPROFEN 600 MG/1
600 TABLET ORAL 4 TIMES DAILY PRN
Qty: 120 TABLET | Refills: 2 | Status: SHIPPED | OUTPATIENT
Start: 2024-03-06 | End: 2025-03-06

## 2024-03-19 ENCOUNTER — OFFICE VISIT (OUTPATIENT)
Dept: PRIMARY CARE | Facility: CLINIC | Age: 39
End: 2024-03-19
Payer: COMMERCIAL

## 2024-03-19 VITALS
DIASTOLIC BLOOD PRESSURE: 83 MMHG | WEIGHT: 183.7 LBS | RESPIRATION RATE: 12 BRPM | OXYGEN SATURATION: 98 % | TEMPERATURE: 98 F | HEART RATE: 83 BPM | SYSTOLIC BLOOD PRESSURE: 125 MMHG

## 2024-03-19 DIAGNOSIS — M79.2 NEUROPATHIC PAIN, LEG, LEFT: ICD-10-CM

## 2024-03-19 PROBLEM — T07.XXXA MULTIPLE TRAUMA: Status: RESOLVED | Noted: 2023-05-18 | Resolved: 2024-03-19

## 2024-03-19 PROBLEM — S72.91XC: Status: RESOLVED | Noted: 2023-05-05 | Resolved: 2024-03-19

## 2024-03-19 PROBLEM — S22.060A COMPRESSION FRACTURE OF T7 VERTEBRA (MULTI): Status: RESOLVED | Noted: 2023-07-12 | Resolved: 2024-03-19

## 2024-03-19 PROCEDURE — 1036F TOBACCO NON-USER: CPT | Performed by: FAMILY MEDICINE

## 2024-03-19 PROCEDURE — 99214 OFFICE O/P EST MOD 30 MIN: CPT | Performed by: FAMILY MEDICINE

## 2024-03-19 RX ORDER — PREGABALIN 25 MG/1
75 CAPSULE ORAL DAILY
Qty: 90 CAPSULE | Refills: 0 | Status: SHIPPED | OUTPATIENT
Start: 2024-03-19 | End: 2024-09-15

## 2024-03-19 RX ORDER — DULOXETIN HYDROCHLORIDE 30 MG/1
30 CAPSULE, DELAYED RELEASE ORAL 2 TIMES DAILY
Qty: 60 CAPSULE | Refills: 2 | Status: SHIPPED | OUTPATIENT
Start: 2024-03-19 | End: 2024-07-17

## 2024-03-19 ASSESSMENT — ENCOUNTER SYMPTOMS
DIFFICULTY URINATING: 0
CONSTIPATION: 1

## 2024-03-19 NOTE — PROGRESS NOTES
Subjective     Patient ID: Ronnie Lainez is a 39 y.o. male who presents for follow up of chronic medical conditions.    CSA was last done 08/08/2023  UDS was last done  02/08/2024    Everything has been doing okay.    Doing 100 mg once a day of the Lyrica, would like to see if they can make a 50 mg to keep weaning down.   Last steroid injection  seems to still be working.     OARRS:  No data recorded  I have personally reviewed the OARRS report for Ronnie Lainez. I have considered the risks of abuse, dependence, addiction and diversion    Is the patient prescribed a combination of a benzodiazepine and opioid?  No    Last Urine Drug Screen / ordered today: Yes  Recent Results (from the past 8760 hour(s))  -Drug Screen, Urine With Reflex to Confirmation:   Collection Time: 02/08/24  2:20 PM       Result                      Value             Ref Range           Amphetamine Screen, Ur*                       Presumptive *   Presumptive Negative       Barbiturate Screen, Ur*                       Presumptive *   Presumptive Negative       Benzodiazepines Screen*                       Presumptive *   Presumptive Negative       Cannabinoid Screen, Ur*                       Presumptive *   Presumptive Negative       Cocaine Metabolite Scr*                       Presumptive *   Presumptive Negative       Fentanyl Screen, Urine                        Presumptive *   Presumptive Negative       Opiate Screen, Urine                          Presumptive *   Presumptive Negative       Oxycodone Screen, Urine                       Presumptive *   Presumptive Negative       PCP Screen, Urine                             Presumptive *   Presumptive Negative  Results are as expected.         Controlled Substance Agreement:  Date of the Last Agreement:   Reviewed Controlled Substance Agreement including but not limited to the benefits, risks, and alternatives to treatment with a Controlled Substance medication(s).    Lyrica:  What is  the patient's goal of therapy? Pain control  Is this being achieved with current treatment? yes    Pain Assessment:  No data recorded    Activities of Daily Living:  Is your overall impression that this patient is benefiting (symptom reduction outweighs side effects) from Lyrica therapy? Yes     1. Physical Functioning: Better  2. Family Relationship: Better  3. Social Relationship: Better  4. Mood: Better  5. Sleep Patterns: Better  6. Overall Function: Better        His last steroid injection was at the end of February, feeling good. He would like to move down to 75 mg of Lyrica, using 25 mg tablets, and continue to wean the dose over the next few months. He is still feeling some pressure around his left big toe. Within the last few weeks the pain in his left knee has started subsiding. He has  mild pelvic pain, he states 1-2/10. His bladder is working well, he can feel when he needs to go and has no problems. He is still having hard stools, not currently using any fiber supplement but has been using laxatives as needed.   He is still dealing with the courts over custody of his daughter but he recently did win time with her weekly which is very exciting for him.         Review of Systems   Gastrointestinal:  Positive for constipation.   Genitourinary:  Negative for difficulty urinating and urgency.       Objective   /83 (BP Location: Right arm, Patient Position: Sitting, BP Cuff Size: Adult)   Pulse 83   Temp 36.7 °C (98 °F) (Temporal)   Resp 12   Wt 83.3 kg (183 lb 11.2 oz)   SpO2 98%     Physical Exam  Constitutional:       Appearance: Normal appearance.   Neurological:      General: No focal deficit present.      Mental Status: He is alert.   Psychiatric:         Mood and Affect: Mood normal.         Behavior: Behavior normal.         Thought Content: Thought content normal.         Judgment: Judgment normal.         Assessment/Plan   Problem List Items Addressed This Visit       Neuropathic pain,  leg, left     EMG showed sciatic nerve damage.  Steroid injection still working.  Currently taking Lyrica 100 mg daily, ready to begin weaning process.  Rx sent for Lyrica 25 mg. Start at 75 mg daily, reducing the dose by 25 mg every 2 weeks.  Send messages through [employer] throughout the process to update on progress and symptoms.         Relevant Medications    DULoxetine (Cymbalta) 30 mg DR capsule    pregabalin (Lyrica) 25 mg capsule       Scribe Attestation  By signing my name below, I, Luci Lott , Jevon   attest that this documentation has been prepared under the direction and in the presence of Abdelrahman David MD.

## 2024-03-19 NOTE — ASSESSMENT & PLAN NOTE
EMG showed sciatic nerve damage.  Steroid injection still working.  Currently taking Lyrica 100 mg daily, ready to begin weaning process.  Rx sent for Lyrica 25 mg. Start at 75 mg daily, reducing the dose by 25 mg every 2 weeks.  Send messages through [employer] throughout the process to update on progress and symptoms.

## 2024-03-20 ENCOUNTER — TELEPHONE (OUTPATIENT)
Dept: PRIMARY CARE | Facility: CLINIC | Age: 39
End: 2024-03-20
Payer: COMMERCIAL

## 2024-03-21 NOTE — TELEPHONE ENCOUNTER
Left message for patient to call back to see if he picked up the script for lyrica yet.   If so did he pay out of pocket

## 2024-03-21 NOTE — TELEPHONE ENCOUNTER
Patient did not  yet, planned on going tomorrow     He will call us tomorrow if he has any issues

## 2024-04-30 RX ORDER — ERGOCALCIFEROL 1.25 MG/1
50000 CAPSULE ORAL WEEKLY
Qty: 12 CAPSULE | Refills: 1 | OUTPATIENT
Start: 2024-04-30

## 2024-04-30 NOTE — TELEPHONE ENCOUNTER
Pharmacy interface requesting refill of  Vitamin D .    Last office visit:10/07/2023    Date of Procedure: 6/30/2023     Pre-Op Diagnosis Codes:     * Closed fracture of pelvis ([facility]) [S32.9XXA]     * Fracture of multiple pubic rami, left, closed, initial encounter ([facility]) [S32.592A]     * Displaced fracture of anterior wall of right acetabulum ([facility]) [S32.411A]     Post-Op Diagnosis: Same       Procedure(s):  PELVIS EXTERNAL FIXATOR REMOVAL, Manual stress examination under fluoro     Surgeon(s):  Prateek Le, DO        No future appointments.

## 2024-06-26 ENCOUNTER — APPOINTMENT (OUTPATIENT)
Dept: PRIMARY CARE | Facility: CLINIC | Age: 39
End: 2024-06-26
Payer: COMMERCIAL

## 2024-06-26 VITALS
SYSTOLIC BLOOD PRESSURE: 135 MMHG | TEMPERATURE: 97.8 F | OXYGEN SATURATION: 98 % | DIASTOLIC BLOOD PRESSURE: 87 MMHG | RESPIRATION RATE: 12 BRPM | HEART RATE: 73 BPM | WEIGHT: 197.8 LBS
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
## 2024-06-26 DIAGNOSIS — G56.92 NEUROPATHY, ARM, LEFT: ICD-10-CM

## 2024-06-26 DIAGNOSIS — M79.2 NEUROPATHIC PAIN, LEG, LEFT: Primary | ICD-10-CM

## 2024-06-26 DIAGNOSIS — M47.22 OSTEOARTHRITIS OF SPINE WITH RADICULOPATHY, CERVICAL REGION: ICD-10-CM

## 2024-06-26 PROCEDURE — 1036F TOBACCO NON-USER: CPT | Performed by: FAMILY MEDICINE

## 2024-06-26 PROCEDURE — 99214 OFFICE O/P EST MOD 30 MIN: CPT | Performed by: FAMILY MEDICINE

## 2024-06-26 ASSESSMENT — ENCOUNTER SYMPTOMS: NUMBNESS: 1

## 2024-06-26 NOTE — ASSESSMENT & PLAN NOTE
Pt was able to completely wean off of Lyrica.  Currently taking 600 mg Ibuprofen every morning and Cymbalta 30 mg daily.   Occasional leg pain but not lasting for very long.
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
## 2024-06-27 PROBLEM — S32.592A CLOSED FRACTURE OF MULTIPLE PUBIC RAMI, LEFT, INITIAL ENCOUNTER (MULTI): Status: RESOLVED | Noted: 2023-05-05 | Resolved: 2024-06-27

## 2024-06-27 PROBLEM — S32.10XD: Status: RESOLVED | Noted: 2023-07-12 | Resolved: 2024-06-27

## 2024-06-27 PROBLEM — S32.411A: Status: RESOLVED | Noted: 2023-05-05 | Resolved: 2024-06-27

## 2024-06-27 PROBLEM — S32.9XXA: Status: RESOLVED | Noted: 2023-05-05 | Resolved: 2024-06-27
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
## 2024-06-27 NOTE — ASSESSMENT & PLAN NOTE
"Continues follow up with ortho at [facility]    Encounter Details  Date Type Department Care Team (Latest Contact Info) Description   03/28/2024 12:00 PM EDT Office Visit [facility]   [address] [place] [address] [phone]  Yazmin Gilmore MD, PhD   [address] [place] [id number] [phone] (Work)   [phone] (Fax)       Per ortho note: \"This is a 39-year-old male status post revision of a right femoral nonunion. Overall he is doing very well. We will plan for him to continue to be weight-bearing as tolerated. He will continue to work with the spine center for management of some of his nerve symptoms. We will plan to see him back in 3 months' time. We will also get x-rays of his pelvis at that time to further evaluate how he is doing.    Xrays at next visit: AP pelvis, inlet, outlet, right knee, right femur \"  "

## 2024-07-17 DIAGNOSIS — M79.2 NEUROPATHIC PAIN, LEG, LEFT: ICD-10-CM

## 2024-07-17 RX ORDER — DULOXETIN HYDROCHLORIDE 30 MG/1
30 CAPSULE, DELAYED RELEASE ORAL DAILY
Qty: 30 CAPSULE | Refills: 3 | Status: SHIPPED | OUTPATIENT
Start: 2024-07-17 | End: 2024-11-14

## 2024-11-14 DIAGNOSIS — M79.2 NEUROPATHIC PAIN, LEG, LEFT: ICD-10-CM

## 2024-11-14 RX ORDER — DULOXETIN HYDROCHLORIDE 30 MG/1
30 CAPSULE, DELAYED RELEASE ORAL DAILY
Qty: 90 CAPSULE | Refills: 1 | Status: SHIPPED | OUTPATIENT
Start: 2024-11-14

## 2024-12-12 ENCOUNTER — APPOINTMENT (OUTPATIENT)
Dept: PRIMARY CARE | Facility: CLINIC | Age: 39
End: 2024-12-12
Payer: COMMERCIAL

## 2024-12-12 VITALS
HEART RATE: 82 BPM | WEIGHT: 224.3 LBS | HEIGHT: 72 IN | SYSTOLIC BLOOD PRESSURE: 131 MMHG | DIASTOLIC BLOOD PRESSURE: 76 MMHG | RESPIRATION RATE: 14 BRPM | TEMPERATURE: 98.4 F | BODY MASS INDEX: 30.38 KG/M2 | OXYGEN SATURATION: 97 %

## 2024-12-12 DIAGNOSIS — N52.1 ERECTILE DYSFUNCTION DUE TO DISEASES CLASSIFIED ELSEWHERE: ICD-10-CM
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
## 2024-12-12 DIAGNOSIS — E55.9 VITAMIN D DEFICIENCY: ICD-10-CM

## 2024-12-12 DIAGNOSIS — Z00.00 WELLNESS EXAMINATION: ICD-10-CM

## 2024-12-12 DIAGNOSIS — M79.2 NEUROPATHIC PAIN, LEG, LEFT: ICD-10-CM

## 2024-12-12 DIAGNOSIS — Z11.3 SCREEN FOR STD (SEXUALLY TRANSMITTED DISEASE): ICD-10-CM

## 2024-12-12 DIAGNOSIS — Z91.030 BEE STING ALLERGY: ICD-10-CM

## 2024-12-12 PROCEDURE — 3008F BODY MASS INDEX DOCD: CPT | Performed by: FAMILY MEDICINE

## 2024-12-12 PROCEDURE — 99214 OFFICE O/P EST MOD 30 MIN: CPT | Performed by: FAMILY MEDICINE

## 2024-12-12 PROCEDURE — 87109 MYCOPLASMA: CPT

## 2024-12-12 RX ORDER — SILDENAFIL 100 MG/1
100 TABLET, FILM COATED ORAL DAILY PRN
Qty: 30 TABLET | Refills: 0 | Status: SHIPPED | OUTPATIENT
Start: 2024-12-12 | End: 2025-12-12

## 2024-12-12 RX ORDER — IBUPROFEN 600 MG/1
600 TABLET ORAL 4 TIMES DAILY PRN
Qty: 120 TABLET | Refills: 2 | Status: SHIPPED | OUTPATIENT
Start: 2024-12-12 | End: 2025-12-12

## 2024-12-12 RX ORDER — TADALAFIL 20 MG/1
20 TABLET ORAL AS NEEDED
Qty: 30 TABLET | Refills: 0 | Status: SHIPPED | OUTPATIENT
Start: 2024-12-12

## 2024-12-12 RX ORDER — EPINEPHRINE 0.3 MG/.3ML
1 INJECTION SUBCUTANEOUS ONCE AS NEEDED
Qty: 2 EACH | Refills: 1 | Status: SHIPPED | OUTPATIENT
Start: 2024-12-12

## 2024-12-12 RX ORDER — ASPIRIN 325 MG
50000 TABLET, DELAYED RELEASE (ENTERIC COATED) ORAL WEEKLY
Qty: 12 CAPSULE | Refills: 3 | Status: SHIPPED | OUTPATIENT
Start: 2024-12-12

## 2024-12-12 ASSESSMENT — ENCOUNTER SYMPTOMS
CARDIOVASCULAR NEGATIVE: 1
RESPIRATORY NEGATIVE: 1
GASTROINTESTINAL NEGATIVE: 1
NEUROLOGICAL NEGATIVE: 1
PSYCHIATRIC NEGATIVE: 1

## 2024-12-12 NOTE — ASSESSMENT & PLAN NOTE
Doing very well, no longer needing cane.  Last visit w/ ortho included imaging - some fractures not fully healed at this time.  All restrictions lifted, he can resume normal activities.
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
## 2024-12-12 NOTE — ASSESSMENT & PLAN NOTE
Currently able to manage pain w/ 600 mg Ibuprofen up to QID PRN and Cymbalta 30 mg daily.   Occasional leg pain but not lasting for very long.

## 2024-12-14 LAB — UREAPLASMA/MYCOPLASMA CULTURE: NORMAL

## 2024-12-18 LAB — UREAPLASMA/MYCOPLASMA CULTURE: NORMAL

## 2025-03-15 DIAGNOSIS — M79.2 NEUROPATHIC PAIN, LEG, LEFT: ICD-10-CM

## 2025-03-17 RX ORDER — DULOXETIN HYDROCHLORIDE 30 MG/1
30 CAPSULE, DELAYED RELEASE ORAL DAILY
Qty: 90 CAPSULE | Refills: 1 | Status: SHIPPED | OUTPATIENT
Start: 2025-03-17

## 2025-03-24 DIAGNOSIS — M79.2 NEUROPATHIC PAIN, LEG, LEFT: ICD-10-CM

## 2025-03-25 RX ORDER — IBUPROFEN 600 MG/1
600 TABLET ORAL 4 TIMES DAILY PRN
Qty: 120 TABLET | Refills: 2 | Status: SHIPPED | OUTPATIENT
Start: 2025-03-25 | End: 2026-03-25

## 2025-03-31 DIAGNOSIS — M79.2 NEUROPATHIC PAIN, LEG, LEFT: ICD-10-CM

## 2025-04-01 RX ORDER — DULOXETIN HYDROCHLORIDE 30 MG/1
30 CAPSULE, DELAYED RELEASE ORAL DAILY
Qty: 90 CAPSULE | Refills: 1 | Status: SHIPPED | OUTPATIENT
Start: 2025-04-01 | End: 2025-09-28

## 2025-06-12 ENCOUNTER — APPOINTMENT (OUTPATIENT)
Dept: PRIMARY CARE | Facility: CLINIC | Age: 40
End: 2025-06-12
Payer: COMMERCIAL

## 2025-06-12 VITALS
HEIGHT: 72 IN | SYSTOLIC BLOOD PRESSURE: 115 MMHG | WEIGHT: 233.8 LBS | HEART RATE: 75 BPM | BODY MASS INDEX: 31.67 KG/M2 | OXYGEN SATURATION: 97 % | DIASTOLIC BLOOD PRESSURE: 77 MMHG | TEMPERATURE: 97.7 F | RESPIRATION RATE: 14 BRPM

## 2025-06-12 DIAGNOSIS — M79.2 NEUROPATHIC PAIN, LEG, LEFT: ICD-10-CM

## 2025-06-12 DIAGNOSIS — N52.1 ERECTILE DYSFUNCTION DUE TO DISEASES CLASSIFIED ELSEWHERE: ICD-10-CM

## 2025-06-12 DIAGNOSIS — N52.8 OTHER MALE ERECTILE DYSFUNCTION: ICD-10-CM

## 2025-06-12 DIAGNOSIS — E55.9 VITAMIN D DEFICIENCY: Primary | ICD-10-CM
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
## 2025-06-12 PROCEDURE — 3008F BODY MASS INDEX DOCD: CPT | Performed by: FAMILY MEDICINE

## 2025-06-12 PROCEDURE — 99214 OFFICE O/P EST MOD 30 MIN: CPT | Performed by: FAMILY MEDICINE

## 2025-06-12 RX ORDER — SILDENAFIL 100 MG/1
100 TABLET, FILM COATED ORAL DAILY PRN
Qty: 30 TABLET | Refills: 0 | Status: SHIPPED | OUTPATIENT
Start: 2025-06-12 | End: 2025-06-12 | Stop reason: SDUPTHER

## 2025-06-12 RX ORDER — DULOXETIN HYDROCHLORIDE 20 MG/1
20 CAPSULE, DELAYED RELEASE ORAL DAILY
Qty: 90 CAPSULE | Refills: 0 | Status: SHIPPED | OUTPATIENT
Start: 2025-06-12 | End: 2025-12-09

## 2025-06-12 RX ORDER — TADALAFIL 20 MG/1
20 TABLET ORAL AS NEEDED
Qty: 30 TABLET | Refills: 0 | Status: SHIPPED | OUTPATIENT
Start: 2025-06-12 | End: 2025-06-12 | Stop reason: SDUPTHER

## 2025-06-12 RX ORDER — IBUPROFEN 600 MG/1
600 TABLET, FILM COATED ORAL 4 TIMES DAILY PRN
Qty: 120 TABLET | Refills: 2 | Status: SHIPPED | OUTPATIENT
Start: 2025-06-12 | End: 2026-06-12

## 2025-06-12 RX ORDER — TADALAFIL 20 MG/1
20 TABLET ORAL AS NEEDED
Qty: 30 TABLET | Refills: 0 | Status: SHIPPED | OUTPATIENT
Start: 2025-06-12

## 2025-06-12 RX ORDER — SILDENAFIL 100 MG/1
100 TABLET, FILM COATED ORAL DAILY PRN
Qty: 30 TABLET | Refills: 0 | Status: SHIPPED | OUTPATIENT
Start: 2025-06-12 | End: 2026-06-12

## 2025-06-12 RX ORDER — ASPIRIN 325 MG
1.25 TABLET, DELAYED RELEASE (ENTERIC COATED) ORAL WEEKLY
Qty: 12 CAPSULE | Refills: 3 | Status: SHIPPED | OUTPATIENT
Start: 2025-06-12

## 2025-06-12 ASSESSMENT — ANXIETY QUESTIONNAIRES
1. FEELING NERVOUS, ANXIOUS, OR ON EDGE: NOT AT ALL
3. WORRYING TOO MUCH ABOUT DIFFERENT THINGS: NOT AT ALL
5. BEING SO RESTLESS THAT IT IS HARD TO SIT STILL: NOT AT ALL
GAD7 TOTAL SCORE: 0
6. BECOMING EASILY ANNOYED OR IRRITABLE: NOT AT ALL
7. FEELING AFRAID AS IF SOMETHING AWFUL MIGHT HAPPEN: NOT AT ALL
2. NOT BEING ABLE TO STOP OR CONTROL WORRYING: NOT AT ALL
4. TROUBLE RELAXING: NOT AT ALL

## 2025-06-12 ASSESSMENT — ENCOUNTER SYMPTOMS
RESPIRATORY NEGATIVE: 1
CARDIOVASCULAR NEGATIVE: 1
PSYCHIATRIC NEGATIVE: 1
GASTROINTESTINAL NEGATIVE: 1
NEUROLOGICAL NEGATIVE: 1
MUSCULOSKELETAL NEGATIVE: 1

## 2025-06-12 ASSESSMENT — PATIENT HEALTH QUESTIONNAIRE - PHQ9
SUM OF ALL RESPONSES TO PHQ QUESTIONS 1-9: 0
3. TROUBLE FALLING OR STAYING ASLEEP OR SLEEPING TOO MUCH: NOT AT ALL
9. THOUGHTS THAT YOU WOULD BE BETTER OFF DEAD, OR OF HURTING YOURSELF: NOT AT ALL
2. FEELING DOWN, DEPRESSED OR HOPELESS: NOT AT ALL
4. FEELING TIRED OR HAVING LITTLE ENERGY: NOT AT ALL
6. FEELING BAD ABOUT YOURSELF - OR THAT YOU ARE A FAILURE OR HAVE LET YOURSELF OR YOUR FAMILY DOWN: NOT AT ALL
1. LITTLE INTEREST OR PLEASURE IN DOING THINGS: NOT AT ALL
7. TROUBLE CONCENTRATING ON THINGS, SUCH AS READING THE NEWSPAPER OR WATCHING TELEVISION: NOT AT ALL
5. POOR APPETITE OR OVEREATING: NOT AT ALL
SUM OF ALL RESPONSES TO PHQ9 QUESTIONS 1 AND 2: 0
8. MOVING OR SPEAKING SO SLOWLY THAT OTHER PEOPLE COULD HAVE NOTICED. OR THE OPPOSITE, BEING SO FIGETY OR RESTLESS THAT YOU HAVE BEEN MOVING AROUND A LOT MORE THAN USUAL: NOT AT ALL
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
## 2025-06-12 NOTE — ASSESSMENT & PLAN NOTE
Currently able to manage pain w/ 600 mg Ibuprofen up to QID PRN and Cymbalta 30 mg daily.   Occasional leg pain but not lasting for very long, mainly at night.  Pt would like to try to wean duloxetine, advised pt nerve pain/cramping should not worsen off this med.    Recommend starting nightly magnesium oxide supplement and 6-8 oz tonic water.  Begin duloxetine 20 mg for a month, if no change in pain or minimal/tolerable.  If able, decrease to 20 mg every other day for month, can then stop after.  If sxs worsen and become intolerable, return to previous comfortable dosing.

## 2025-06-17 PROBLEM — E78.1 HIGH TRIGLYCERIDES: Status: ACTIVE | Noted: 2025-06-17

## 2025-12-04 ENCOUNTER — APPOINTMENT (OUTPATIENT)
Dept: PRIMARY CARE | Facility: CLINIC | Age: 40
End: 2025-12-04
Payer: COMMERCIAL

## (undated) DEVICE — GAUZE,SPONGE,4"X4",8PLY,STRL,LF,10/TRAY: Brand: MEDLINE

## (undated) DEVICE — DRESSING,GAUZE,XEROFORM,CURAD,5"X9",ST: Brand: CURAD

## (undated) DEVICE — GLOVE SURG SZ 8 L12IN FNGR THK79MIL GRN LTX FREE

## (undated) DEVICE — GLOVE ORTHO 8   MSG9480

## (undated) DEVICE — BLADE,STAINLESS-STEEL,15,STRL,DISPOSABLE: Brand: MEDLINE

## (undated) DEVICE — 1000 S-DRAPE TOWEL DRAPE 10/BX: Brand: STERI-DRAPE™

## (undated) DEVICE — DRESSING HYDROFIBER AQUACEL AG ADVANTAGE 3.5X6 IN

## (undated) DEVICE — 3M™ IOBAN™ 2 ANTIMICROBIAL INCISE DRAPE 6650EZ: Brand: IOBAN™ 2

## (undated) DEVICE — ROD EXT FIX L300MM DIA11MM C FBR MR CONDITIONAL

## (undated) DEVICE — COVER,TABLE,60X90,STERILE: Brand: MEDLINE

## (undated) DEVICE — SCREW BNE LCK 5X84 MM TI STRL RFNADVANCED 04045084S
Type: IMPLANTABLE DEVICE | Site: FEMUR | Status: NON-FUNCTIONAL
Removed: 2023-05-08

## (undated) DEVICE — UNIVERSAL DRAPE: Brand: MEDLINE INDUSTRIES, INC.

## (undated) DEVICE — GLOVE ORANGE PI 8   MSG9080

## (undated) DEVICE — BIT DRL L145MM DIA4.2MM ST 3 FLUT NDL PNT QUIK CPL FOR FEM

## (undated) DEVICE — CONVERTORS STOCKINETTE: Brand: CONVERTORS

## (undated) DEVICE — 3M™ STERI-DRAPE™ U-DRAPE 1015: Brand: STERI-DRAPE™

## (undated) DEVICE — ROD EXT FIX L250MM DIA11MM C FBR MR CONDITIONAL

## (undated) DEVICE — POST EXT FIX DIA11MM STR OUTRIG MAG RESONANCE CONDITIONAL

## (undated) DEVICE — GUIDEWIRE ORTH L400MM DIA3.2MM FOR TFN

## (undated) DEVICE — SCREW FIX L200MM DIA5MM THRD L80MM S STL SELF DRL SCHNZ

## (undated) DEVICE — DRAPE EQUIP CARM 72X42 IN RUBBER BND CLP

## (undated) DEVICE — TOWEL,OR,DSP,ST,BLUE,STD,6/PK,12PK/CS: Brand: MEDLINE

## (undated) DEVICE — SWABSTCK, BENZOIN TINCTURE, 1/PK, STRL: Brand: APLICARE

## (undated) DEVICE — PAD,ABDOMINAL,5"X9",ST,LF,25/BX: Brand: MEDLINE INDUSTRIES, INC.

## (undated) DEVICE — Device

## (undated) DEVICE — AGENT HEMSTAT 5GM ARISTA AH

## (undated) DEVICE — GUIDEWIRE ORTH DIA2.8MM 300/150MM CALIB W/ FLUT FOR 6.5MM

## (undated) DEVICE — SCREW EXT FIX L175MM DIA5MM THRD L60MM S STL SELF DRL SCHNZ

## (undated) DEVICE — LOWER EXT HIP DRAPE: Brand: MEDLINE INDUSTRIES, INC.

## (undated) DEVICE — POST EXT FIX DIA11MM 90DEG OUTRIG MAG RESONANCE CONDITIONAL

## (undated) DEVICE — SOLUTION IRRIG 3000ML 0.9% SOD CHL USP UROMATIC PLAS CONT

## (undated) DEVICE — DRESSING,GAUZE,XEROFORM,CURAD,1"X8",ST: Brand: CURAD

## (undated) DEVICE — CLAMP EXT FIX L PIN 6 POS MAG RESONANCE CONDITIONAL

## (undated) DEVICE — CLOTH SURG PREP PREOPERATIVE CHLORHEXIDINE GLUC 2% READYPREP

## (undated) DEVICE — HANDPIECE SET WITH BONE CLEANING TIP AND SUCTION TUBE: Brand: INTERPULSE

## (undated) DEVICE — GOWN,AURORA,BRTHSLV,2XL,18/CS: Brand: MEDLINE

## (undated) DEVICE — ROD RMR L950MM DIA2.5MM W/ EXTN BALL TIP

## (undated) DEVICE — BANDAGE,GAUZE,4.5"X4.1YD,STERILE,LF: Brand: MEDLINE

## (undated) DEVICE — BIT DRL L300MM DIA7.3MM QUIK CPL W/O STP REUSE FOR

## (undated) DEVICE — CLAMP EXT FIX L TI ALLY COMB CLP ON SELF HLD

## (undated) DEVICE — BIT DRILL CANN 12.8 LRG QC

## (undated) DEVICE — GAUZE,SPONGE,AVANT,4"X4",4PLY,STRL,10/TR: Brand: MEDLINE

## (undated) DEVICE — APPLICATOR MEDICATED 26 CC SOLUTION HI LT ORNG CHLORAPREP

## (undated) DEVICE — PADDING,UNDERCAST,COTTON, 4"X4YD STERILE: Brand: MEDLINE

## (undated) DEVICE — DRESSING HYDROFIBER AQUACEL AG ADVANTAGE 3.5X10 IN

## (undated) DEVICE — ELECTRODE PT RET AD L9FT HI MOIST COND ADH HYDRGEL CORDED

## (undated) DEVICE — E-Z CLEAN, NON-STICK, PTFE COATED, ELECTROSURGICAL BLADE ELECTRODE, 6.5 INCH (16.5 CM): Brand: MEGADYNE

## (undated) DEVICE — DRESSING HYDROFIBER AQUACEL AG ADVANTAGE 3.5X12 IN

## (undated) DEVICE — BANDAGE COMPR W4INXL10YD WHITE/BEIGE E MTRX HK LOOP CLSR

## (undated) DEVICE — TUBING SUCT 12FR MAL ALUM SHFT FN CAP VENT UNIV CONN W/ OBT

## (undated) DEVICE — BLADE CLIPPER GEN PURP NS

## (undated) DEVICE — DRAPE,REIN 53X77,STERILE: Brand: MEDLINE

## (undated) DEVICE — BANDAGE,GAUZE,BULKEE II,2.25"X3YD,STRL: Brand: MEDLINE